# Patient Record
Sex: MALE | Race: WHITE | Employment: OTHER | URBAN - METROPOLITAN AREA
[De-identification: names, ages, dates, MRNs, and addresses within clinical notes are randomized per-mention and may not be internally consistent; named-entity substitution may affect disease eponyms.]

---

## 2017-11-15 PROBLEM — I73.9 PERIPHERAL VASCULAR DISEASE (HCC): Chronic | Status: ACTIVE | Noted: 2017-11-15

## 2017-11-15 PROBLEM — I65.23 CAROTID STENOSIS, ASYMPTOMATIC, BILATERAL: Chronic | Status: ACTIVE | Noted: 2017-11-15

## 2017-11-15 PROBLEM — I65.29 CAROTID ARTERY STENOSIS: Chronic | Status: ACTIVE | Noted: 2017-11-15

## 2017-11-17 PROBLEM — I65.23 CAROTID STENOSIS, ASYMPTOMATIC, BILATERAL: Chronic | Status: ACTIVE | Noted: 2017-11-17

## 2018-05-09 ENCOUNTER — HOSPITAL ENCOUNTER (OUTPATIENT)
Dept: CARDIOLOGY | Age: 78
Discharge: HOME | End: 2018-05-09
Payer: MEDICARE

## 2018-05-09 ENCOUNTER — HOSPITAL ENCOUNTER (OUTPATIENT)
Dept: CARDIOLOGY | Age: 78
Discharge: HOME | End: 2018-05-09
Admitting: SURGERY
Payer: MEDICARE

## 2018-05-09 ENCOUNTER — OFFICE VISIT (OUTPATIENT)
Dept: VASCULAR SURGERY | Age: 78
End: 2018-05-09
Payer: MEDICARE

## 2018-05-09 VITALS — DIASTOLIC BLOOD PRESSURE: 86 MMHG | HEART RATE: 74 BPM | SYSTOLIC BLOOD PRESSURE: 144 MMHG

## 2018-05-09 DIAGNOSIS — I65.23 CAROTID STENOSIS, ASYMPTOMATIC, BILATERAL: Chronic | ICD-10-CM

## 2018-05-09 DIAGNOSIS — I73.9 PERIPHERAL VASCULAR DISEASE (HCC): Chronic | ICD-10-CM

## 2018-05-09 DIAGNOSIS — I73.9 PERIPHERAL VASCULAR DISEASE (HCC): Primary | Chronic | ICD-10-CM

## 2018-05-09 DIAGNOSIS — I65.23 CAROTID ARTERY STENOSIS WITHOUT CEREBRAL INFARCTION, BILATERAL: Chronic | ICD-10-CM

## 2018-05-09 PROCEDURE — 1036F TOBACCO NON-USER: CPT | Performed by: SURGERY

## 2018-05-09 PROCEDURE — G8427 DOCREV CUR MEDS BY ELIG CLIN: HCPCS | Performed by: SURGERY

## 2018-05-09 PROCEDURE — 4040F PNEUMOC VAC/ADMIN/RCVD: CPT | Performed by: SURGERY

## 2018-05-09 PROCEDURE — 93880 EXTRACRANIAL BILAT STUDY: CPT

## 2018-05-09 PROCEDURE — 99213 OFFICE O/P EST LOW 20 MIN: CPT | Performed by: SURGERY

## 2018-05-09 PROCEDURE — 1123F ACP DISCUSS/DSCN MKR DOCD: CPT | Performed by: SURGERY

## 2018-05-09 PROCEDURE — 93923 UPR/LXTR ART STDY 3+ LVLS: CPT

## 2018-05-09 PROCEDURE — G8421 BMI NOT CALCULATED: HCPCS | Performed by: SURGERY

## 2018-05-09 PROCEDURE — G8598 ASA/ANTIPLAT THER USED: HCPCS | Performed by: SURGERY

## 2018-05-09 RX ORDER — HYDRALAZINE HYDROCHLORIDE 25 MG/1
50 TABLET, FILM COATED ORAL 3 TIMES DAILY
Refills: 5 | COMMUNITY
Start: 2018-04-12 | End: 2019-02-06 | Stop reason: DRUGHIGH

## 2018-10-23 ENCOUNTER — HOSPITAL ENCOUNTER (EMERGENCY)
Age: 78
Discharge: HOME | End: 2018-10-23
Attending: EMERGENCY MEDICINE
Payer: MEDICARE

## 2018-10-23 VITALS
BODY MASS INDEX: 33.33 KG/M2 | HEIGHT: 69 IN | TEMPERATURE: 98.4 F | OXYGEN SATURATION: 96 % | WEIGHT: 225 LBS | DIASTOLIC BLOOD PRESSURE: 86 MMHG | HEART RATE: 97 BPM | SYSTOLIC BLOOD PRESSURE: 182 MMHG | RESPIRATION RATE: 16 BRPM

## 2018-10-23 DIAGNOSIS — L03.113 CELLULITIS OF RIGHT UPPER EXTREMITY: Primary | ICD-10-CM

## 2018-10-23 PROCEDURE — 6360000002 HC RX W HCPCS: Performed by: EMERGENCY MEDICINE

## 2018-10-23 PROCEDURE — 96372 THER/PROPH/DIAG INJ SC/IM: CPT

## 2018-10-23 PROCEDURE — 6370000000 HC RX 637 (ALT 250 FOR IP): Performed by: EMERGENCY MEDICINE

## 2018-10-23 PROCEDURE — 2500000003 HC RX 250 WO HCPCS: Performed by: EMERGENCY MEDICINE

## 2018-10-23 PROCEDURE — 99283 EMERGENCY DEPT VISIT LOW MDM: CPT

## 2018-10-23 RX ORDER — ACETAMINOPHEN 500 MG
1000 TABLET ORAL ONCE
Status: COMPLETED | OUTPATIENT
Start: 2018-10-23 | End: 2018-10-23

## 2018-10-23 RX ORDER — CEPHALEXIN 500 MG/1
500 CAPSULE ORAL 4 TIMES DAILY
Qty: 40 CAPSULE | Refills: 0 | Status: SHIPPED | OUTPATIENT
Start: 2018-10-23 | End: 2018-11-02

## 2018-10-23 RX ADMIN — ACETAMINOPHEN 1000 MG: 500 TABLET, FILM COATED ORAL at 19:39

## 2018-10-23 RX ADMIN — LIDOCAINE HYDROCHLORIDE 1 G: 10 INJECTION, SOLUTION INFILTRATION; PERINEURAL at 19:39

## 2018-10-23 ASSESSMENT — PAIN SCALES - GENERAL: PAINLEVEL_OUTOF10: 10

## 2018-10-23 ASSESSMENT — PAIN DESCRIPTION - LOCATION: LOCATION: ELBOW

## 2018-10-23 ASSESSMENT — PAIN DESCRIPTION - PAIN TYPE: TYPE: ACUTE PAIN

## 2018-10-23 ASSESSMENT — PAIN DESCRIPTION - ORIENTATION: ORIENTATION: RIGHT

## 2018-10-23 ASSESSMENT — PAIN DESCRIPTION - DESCRIPTORS: DESCRIPTORS: TENDER

## 2018-10-23 NOTE — ED PROVIDER NOTES
medications have been reviewed. Allergies: Patient has no known allergies. -----------------------------------------PHYSICAL EXAM------------------------------------------------  Constitutional/General: Alert and oriented x3, well appearing, non toxic  Head: NC/AT  Eyes: PERRL, EOMI  Mouth: Oropharynx clear, handling secretions  Neck: Supple, full ROM  Pulmonary: Lungs clear to auscultation bilaterally, no wheezes, rales, or rhonchi. Not in respiratory distress. Cardiovascular:  Regular rate and rhythm, no murmurs, gallops, or rubs. 2+ distal pulses. Abdomen: Soft, non tender, non distended  Extremities: Moves all extremities. Warm and well perfused. Limited ROM of the right upper extremity d/t right elbow pain. Tenderness, erythema, and edema of the right elbow. No obvious deformity. No crepitus. Skin: warm and dry without rash. Neurologic: GCS 15, CN 2-12 grossly intact, no focal deficits, no meningeal signs  Psych: Normal Affect.    -------------------------------------------------- RESULTS -------------------------------------------------  All laboratory and radiology results have been personally reviewed by myself   LABS:  No results found for this visit on 10/23/18. RADIOLOGY:  Interpreted by Radiologist.  No orders to display       ------------------------- NURSING NOTES AND VITALS REVIEWED ---------------------------  The nursing notes within the ED encounter and vital signs as below have been reviewed.    BP (!) 182/86   Pulse 97   Temp 98.4 °F (36.9 °C) (Oral)   Resp 16   Ht 5' 9\" (1.753 m)   Wt 225 lb (102.1 kg)   SpO2 96%   BMI 33.23 kg/m²   Oxygen Saturation Interpretation: Normal    ------------------------------- ED COURSE/MEDICAL DECISION MAKING----------------------  Medications   cefTRIAXone (ROCEPHIN) 1 g in lidocaine 1 % 2.86 mL IM Injection (not administered)   acetaminophen (TYLENOL) tablet 1,000 mg (not administered)     Medical Decision Making:   Pt presents with

## 2018-11-14 ENCOUNTER — HOSPITAL ENCOUNTER (OUTPATIENT)
Dept: CARDIOLOGY | Age: 78
Discharge: HOME | End: 2018-11-14
Payer: MEDICARE

## 2018-11-14 ENCOUNTER — OFFICE VISIT (OUTPATIENT)
Dept: VASCULAR SURGERY | Age: 78
End: 2018-11-14
Payer: MEDICARE

## 2018-11-14 VITALS — DIASTOLIC BLOOD PRESSURE: 66 MMHG | HEART RATE: 70 BPM | SYSTOLIC BLOOD PRESSURE: 136 MMHG

## 2018-11-14 DIAGNOSIS — I65.23 BILATERAL CAROTID ARTERY STENOSIS: Primary | ICD-10-CM

## 2018-11-14 DIAGNOSIS — I73.9 PERIPHERAL VASCULAR DISEASE (HCC): Chronic | ICD-10-CM

## 2018-11-14 DIAGNOSIS — I65.23 CAROTID ARTERY STENOSIS WITHOUT CEREBRAL INFARCTION, BILATERAL: Chronic | ICD-10-CM

## 2018-11-14 PROCEDURE — 1123F ACP DISCUSS/DSCN MKR DOCD: CPT | Performed by: SURGERY

## 2018-11-14 PROCEDURE — G8598 ASA/ANTIPLAT THER USED: HCPCS | Performed by: SURGERY

## 2018-11-14 PROCEDURE — G8427 DOCREV CUR MEDS BY ELIG CLIN: HCPCS | Performed by: SURGERY

## 2018-11-14 PROCEDURE — 1036F TOBACCO NON-USER: CPT | Performed by: SURGERY

## 2018-11-14 PROCEDURE — 99213 OFFICE O/P EST LOW 20 MIN: CPT | Performed by: SURGERY

## 2018-11-14 PROCEDURE — 93880 EXTRACRANIAL BILAT STUDY: CPT

## 2018-11-14 PROCEDURE — 1101F PT FALLS ASSESS-DOCD LE1/YR: CPT | Performed by: SURGERY

## 2018-11-14 PROCEDURE — 93923 UPR/LXTR ART STDY 3+ LVLS: CPT

## 2018-11-14 PROCEDURE — G8417 CALC BMI ABV UP PARAM F/U: HCPCS | Performed by: SURGERY

## 2018-11-14 PROCEDURE — G8484 FLU IMMUNIZE NO ADMIN: HCPCS | Performed by: SURGERY

## 2018-11-14 PROCEDURE — 4040F PNEUMOC VAC/ADMIN/RCVD: CPT | Performed by: SURGERY

## 2018-11-14 RX ORDER — CALCITRIOL 0.25 UG/1
0.25 CAPSULE, LIQUID FILLED ORAL DAILY
COMMUNITY

## 2018-11-14 NOTE — PROGRESS NOTES
Vascular Surgery Outpatient Progress Note      Chief Complaint   Patient presents with    Circulatory Problem     Follow up carotid stenosis, PVD. HISTORY OF PRESENT ILLNESS:                The patient is a 76 y.o. male who returns for follow-up evaluation of Carotid artery stenosis and lower extremity peripheral vascular disease. At this point he states he's doing well he has no complaints of right-sided left-sided weakness numbness or vision changes. He denies any pain or discomfort with ambulation. He denies any ulcerations or rest pain. Past Medical History:        Diagnosis Date    Atrial fibrillation (HCC)     CKD (chronic kidney disease) stage 3, GFR 30-59 ml/min ([facility] [address] 12/17/2014    H/O syncope     Hemorrhoids     Hyperlipidemia     Hypertension      Past Surgical History:        Procedure Laterality Date    CAROTID ENDARTERECTOMY      CARPAL TUNNEL RELEASE Bilateral     CHOLECYSTECTOMY      FINGER TRIGGER RELEASE Bilateral     KNEE SURGERY      SHOULDER SURGERY Bilateral      Current Medications:   Prior to Admission medications    Medication Sig Start Date End Date Taking?  Authorizing Provider   calcitRIOL (ROCALTROL) 0.25 MCG capsule Take 0.25 mcg by mouth daily   Yes Historical Provider, MD   rivaroxaban (XARELTO) 15 MG TABS tablet Take 15 mg by mouth daily (with breakfast)   Yes Historical Provider, MD   hydrALAZINE (APRESOLINE) 25 MG tablet Take 50 mg by mouth 3 times daily  4/12/18  Yes Historical Provider, MD   vitamin D (ERGOCALCIFEROL) 80259 units CAPS capsule Take 50,000 Units by mouth once a week 10/10/17  Yes Historical Provider, MD   potassium chloride (KLOR-CON M) 20 MEQ extended release tablet Take 20 mEq by mouth daily 10/26/17  Yes Historical Provider, MD   atorvastatin (LIPITOR) 40 MG tablet Take 40 mg by mouth daily 10/1/17  Yes Historical Provider, MD   primidone (MYSOLINE) 50 MG tablet Take 50 mg by mouth 3 times daily 10/25/17  Yes Historical Provider, MD

## 2018-12-07 ENCOUNTER — TELEPHONE (OUTPATIENT)
Dept: VASCULAR SURGERY | Age: 78
End: 2018-12-07

## 2018-12-07 DIAGNOSIS — I65.23 BILATERAL CAROTID ARTERY STENOSIS: ICD-10-CM

## 2018-12-07 DIAGNOSIS — N18.30 CHRONIC KIDNEY DISEASE, STAGE III (MODERATE) (HCC): ICD-10-CM

## 2018-12-07 DIAGNOSIS — I65.23 BILATERAL CAROTID ARTERY STENOSIS: Primary | ICD-10-CM

## 2018-12-07 RX ORDER — SODIUM CHLORIDE 9 MG/ML
INJECTION, SOLUTION INTRAVENOUS
Qty: 500 ML | Refills: 0 | Status: SHIPPED | OUTPATIENT
Start: 2018-12-07 | End: 2019-02-06

## 2018-12-07 RX ORDER — 0.9 % SODIUM CHLORIDE 0.9 %
500 INTRAVENOUS SOLUTION INTRAVENOUS ONCE
Status: CANCELLED | OUTPATIENT
Start: 2018-12-07 | End: 2018-12-07

## 2018-12-14 ENCOUNTER — HOSPITAL ENCOUNTER (OUTPATIENT)
Dept: CT IMAGING | Age: 78
Discharge: HOME | End: 2018-12-16
Payer: MEDICARE

## 2018-12-14 ENCOUNTER — HOSPITAL ENCOUNTER (OUTPATIENT)
Dept: INFUSION THERAPY | Age: 78
Setting detail: INFUSION SERIES
Discharge: HOME | End: 2018-12-14
Payer: MEDICARE

## 2018-12-14 VITALS
SYSTOLIC BLOOD PRESSURE: 176 MMHG | TEMPERATURE: 98.1 F | DIASTOLIC BLOOD PRESSURE: 69 MMHG | HEART RATE: 53 BPM | RESPIRATION RATE: 18 BRPM | OXYGEN SATURATION: 99 %

## 2018-12-14 DIAGNOSIS — I65.23 BILATERAL CAROTID ARTERY STENOSIS: ICD-10-CM

## 2018-12-14 DIAGNOSIS — I65.23 CAROTID ARTERY STENOSIS WITHOUT CEREBRAL INFARCTION, BILATERAL: ICD-10-CM

## 2018-12-14 DIAGNOSIS — N18.30 CKD (CHRONIC KIDNEY DISEASE) STAGE 3, GFR 30-59 ML/MIN (HCC): ICD-10-CM

## 2018-12-14 LAB
ANION GAP SERPL CALCULATED.3IONS-SCNC: 11 MMOL/L (ref 7–16)
BUN BLDV-MCNC: 44 MG/DL (ref 8–23)
CALCIUM SERPL-MCNC: 9.4 MG/DL (ref 8.6–10.2)
CHLORIDE BLD-SCNC: 103 MMOL/L (ref 98–107)
CO2: 24 MMOL/L (ref 22–29)
CREAT SERPL-MCNC: 1.4 MG/DL (ref 0.7–1.2)
GFR AFRICAN AMERICAN: 59
GFR NON-AFRICAN AMERICAN: 49 ML/MIN/1.73
GLUCOSE BLD-MCNC: 105 MG/DL (ref 74–99)
POTASSIUM SERPL-SCNC: 5 MMOL/L (ref 3.5–5)
SODIUM BLD-SCNC: 138 MMOL/L (ref 132–146)

## 2018-12-14 PROCEDURE — 2580000003 HC RX 258: Performed by: SURGERY

## 2018-12-14 PROCEDURE — 96360 HYDRATION IV INFUSION INIT: CPT

## 2018-12-14 PROCEDURE — 80048 BASIC METABOLIC PNL TOTAL CA: CPT

## 2018-12-14 PROCEDURE — 2580000003 HC RX 258

## 2018-12-14 PROCEDURE — 36415 COLL VENOUS BLD VENIPUNCTURE: CPT

## 2018-12-14 PROCEDURE — 96361 HYDRATE IV INFUSION ADD-ON: CPT

## 2018-12-14 PROCEDURE — 70498 CT ANGIOGRAPHY NECK: CPT

## 2018-12-14 PROCEDURE — 6360000004 HC RX CONTRAST MEDICATION: Performed by: RADIOLOGY

## 2018-12-14 RX ORDER — 0.9 % SODIUM CHLORIDE 0.9 %
500 INTRAVENOUS SOLUTION INTRAVENOUS ONCE
Status: COMPLETED | OUTPATIENT
Start: 2018-12-14 | End: 2018-12-14

## 2018-12-14 RX ORDER — 0.9 % SODIUM CHLORIDE 0.9 %
500 INTRAVENOUS SOLUTION INTRAVENOUS ONCE
Status: CANCELLED | OUTPATIENT
Start: 2018-12-14 | End: 2018-12-14

## 2018-12-14 RX ORDER — SODIUM CHLORIDE 0.9 % (FLUSH) 0.9 %
SYRINGE (ML) INJECTION
Status: COMPLETED
Start: 2018-12-14 | End: 2018-12-14

## 2018-12-14 RX ORDER — SODIUM CHLORIDE 0.9 % (FLUSH) 0.9 %
10 SYRINGE (ML) INJECTION PRN
Status: DISCONTINUED | OUTPATIENT
Start: 2018-12-14 | End: 2018-12-15 | Stop reason: HOSPADM

## 2018-12-14 RX ADMIN — Medication 10 ML: at 08:49

## 2018-12-14 RX ADMIN — IOPAMIDOL 60 ML: 755 INJECTION, SOLUTION INTRAVENOUS at 15:08

## 2018-12-14 RX ADMIN — SODIUM CHLORIDE 500 ML: 9 INJECTION, SOLUTION INTRAVENOUS at 08:50

## 2018-12-18 ENCOUNTER — TELEPHONE (OUTPATIENT)
Dept: VASCULAR SURGERY | Age: 78
End: 2018-12-18

## 2019-02-06 ENCOUNTER — HOSPITAL ENCOUNTER (INPATIENT)
Age: 79
LOS: 5 days | Discharge: HOME | DRG: 813 | End: 2019-02-11
Attending: EMERGENCY MEDICINE | Admitting: INTERNAL MEDICINE
Payer: MEDICARE

## 2019-02-06 DIAGNOSIS — K92.2 GASTROINTESTINAL HEMORRHAGE, UNSPECIFIED GASTROINTESTINAL HEMORRHAGE TYPE: Primary | ICD-10-CM

## 2019-02-06 DIAGNOSIS — K62.5 RECTAL BLEEDING: ICD-10-CM

## 2019-02-06 PROBLEM — D62 ACUTE BLOOD LOSS ANEMIA: Status: ACTIVE | Noted: 2019-02-06

## 2019-02-06 LAB
ABO/RH: NORMAL
ALBUMIN SERPL-MCNC: 3.6 G/DL (ref 3.5–5.2)
ALP BLD-CCNC: 61 U/L (ref 40–129)
ALT SERPL-CCNC: 25 U/L (ref 0–40)
ANION GAP SERPL CALCULATED.3IONS-SCNC: 9 MMOL/L (ref 7–16)
ANTIBODY SCREEN: NORMAL
APTT: 27.6 SEC (ref 24.5–35.1)
AST SERPL-CCNC: 21 U/L (ref 0–39)
BILIRUB SERPL-MCNC: 0.3 MG/DL (ref 0–1.2)
BUN BLDV-MCNC: 100 MG/DL (ref 8–23)
CALCIUM SERPL-MCNC: 8.8 MG/DL (ref 8.6–10.2)
CHLORIDE BLD-SCNC: 104 MMOL/L (ref 98–107)
CO2: 23 MMOL/L (ref 22–29)
CREAT SERPL-MCNC: 2.2 MG/DL (ref 0.7–1.2)
GFR AFRICAN AMERICAN: 35
GFR NON-AFRICAN AMERICAN: 29 ML/MIN/1.73
GLUCOSE BLD-MCNC: 119 MG/DL (ref 74–99)
HCT VFR BLD CALC: 23.9 % (ref 37–54)
HEMOGLOBIN: 7.7 G/DL (ref 12.5–16.5)
INR BLD: 1.3
LACTIC ACID: 1 MMOL/L (ref 0.5–2.2)
LIPASE: 37 U/L (ref 13–60)
MCH RBC QN AUTO: 30.9 PG (ref 26–35)
MCHC RBC AUTO-ENTMCNC: 32.2 % (ref 32–34.5)
MCV RBC AUTO: 96 FL (ref 80–99.9)
PDW BLD-RTO: 15.1 FL (ref 11.5–15)
PLATELET # BLD: 223 E9/L (ref 130–450)
PMV BLD AUTO: 10.4 FL (ref 7–12)
POTASSIUM SERPL-SCNC: 5.2 MMOL/L (ref 3.5–5)
PROTHROMBIN TIME: 14.9 SEC (ref 9.3–12.4)
RBC # BLD: 2.49 E12/L (ref 3.8–5.8)
SODIUM BLD-SCNC: 136 MMOL/L (ref 132–146)
TOTAL PROTEIN: 5.9 G/DL (ref 6.4–8.3)
WBC # BLD: 10.1 E9/L (ref 4.5–11.5)

## 2019-02-06 PROCEDURE — 85730 THROMBOPLASTIN TIME PARTIAL: CPT

## 2019-02-06 PROCEDURE — 6360000002 HC RX W HCPCS: Performed by: INTERNAL MEDICINE

## 2019-02-06 PROCEDURE — 86900 BLOOD TYPING SEROLOGIC ABO: CPT

## 2019-02-06 PROCEDURE — 6370000000 HC RX 637 (ALT 250 FOR IP): Performed by: INTERNAL MEDICINE

## 2019-02-06 PROCEDURE — 83690 ASSAY OF LIPASE: CPT

## 2019-02-06 PROCEDURE — 2580000003 HC RX 258: Performed by: INTERNAL MEDICINE

## 2019-02-06 PROCEDURE — 2060000000 HC ICU INTERMEDIATE R&B

## 2019-02-06 PROCEDURE — C9113 INJ PANTOPRAZOLE SODIUM, VIA: HCPCS | Performed by: INTERNAL MEDICINE

## 2019-02-06 PROCEDURE — 80053 COMPREHEN METABOLIC PANEL: CPT

## 2019-02-06 PROCEDURE — 86901 BLOOD TYPING SEROLOGIC RH(D): CPT

## 2019-02-06 PROCEDURE — 86850 RBC ANTIBODY SCREEN: CPT

## 2019-02-06 PROCEDURE — 2580000003 HC RX 258: Performed by: EMERGENCY MEDICINE

## 2019-02-06 PROCEDURE — 36415 COLL VENOUS BLD VENIPUNCTURE: CPT

## 2019-02-06 PROCEDURE — 83605 ASSAY OF LACTIC ACID: CPT

## 2019-02-06 PROCEDURE — 93005 ELECTROCARDIOGRAM TRACING: CPT | Performed by: STUDENT IN AN ORGANIZED HEALTH CARE EDUCATION/TRAINING PROGRAM

## 2019-02-06 PROCEDURE — 99284 EMERGENCY DEPT VISIT MOD MDM: CPT

## 2019-02-06 PROCEDURE — 85027 COMPLETE CBC AUTOMATED: CPT

## 2019-02-06 PROCEDURE — 85610 PROTHROMBIN TIME: CPT

## 2019-02-06 RX ORDER — HYDRALAZINE HYDROCHLORIDE 50 MG/1
50 TABLET, FILM COATED ORAL 3 TIMES DAILY
Status: DISCONTINUED | OUTPATIENT
Start: 2019-02-06 | End: 2019-02-11 | Stop reason: HOSPADM

## 2019-02-06 RX ORDER — 0.9 % SODIUM CHLORIDE 0.9 %
10 VIAL (ML) INJECTION EVERY 12 HOURS
Status: DISCONTINUED | OUTPATIENT
Start: 2019-02-06 | End: 2019-02-11 | Stop reason: HOSPADM

## 2019-02-06 RX ORDER — ACETAMINOPHEN 325 MG/1
650 TABLET ORAL EVERY 4 HOURS PRN
Status: DISCONTINUED | OUTPATIENT
Start: 2019-02-06 | End: 2019-02-11 | Stop reason: HOSPADM

## 2019-02-06 RX ORDER — PANTOPRAZOLE SODIUM 40 MG/10ML
40 INJECTION, POWDER, LYOPHILIZED, FOR SOLUTION INTRAVENOUS EVERY 12 HOURS
Status: DISCONTINUED | OUTPATIENT
Start: 2019-02-06 | End: 2019-02-07

## 2019-02-06 RX ORDER — SODIUM CHLORIDE 0.9 % (FLUSH) 0.9 %
10 SYRINGE (ML) INJECTION PRN
Status: DISCONTINUED | OUTPATIENT
Start: 2019-02-06 | End: 2019-02-11 | Stop reason: HOSPADM

## 2019-02-06 RX ORDER — 0.9 % SODIUM CHLORIDE 0.9 %
1000 INTRAVENOUS SOLUTION INTRAVENOUS ONCE
Status: COMPLETED | OUTPATIENT
Start: 2019-02-06 | End: 2019-02-06

## 2019-02-06 RX ORDER — HYDRALAZINE HYDROCHLORIDE 50 MG/1
1 TABLET, FILM COATED ORAL 3 TIMES DAILY
Refills: 2 | Status: ON HOLD | COMMUNITY
Start: 2019-01-28 | End: 2020-03-10 | Stop reason: SDUPTHER

## 2019-02-06 RX ORDER — METOPROLOL SUCCINATE 50 MG/1
50 TABLET, EXTENDED RELEASE ORAL DAILY
Status: DISCONTINUED | OUTPATIENT
Start: 2019-02-06 | End: 2019-02-11 | Stop reason: HOSPADM

## 2019-02-06 RX ORDER — PRIMIDONE 50 MG/1
50 TABLET ORAL 3 TIMES DAILY
Status: DISCONTINUED | OUTPATIENT
Start: 2019-02-06 | End: 2019-02-11 | Stop reason: HOSPADM

## 2019-02-06 RX ORDER — METOPROLOL SUCCINATE 50 MG/1
1 TABLET, EXTENDED RELEASE ORAL DAILY
Refills: 5 | COMMUNITY
Start: 2018-11-28 | End: 2019-10-31

## 2019-02-06 RX ORDER — ONDANSETRON 2 MG/ML
4 INJECTION INTRAMUSCULAR; INTRAVENOUS EVERY 6 HOURS PRN
Status: DISCONTINUED | OUTPATIENT
Start: 2019-02-06 | End: 2019-02-11 | Stop reason: HOSPADM

## 2019-02-06 RX ORDER — SODIUM CHLORIDE 0.9 % (FLUSH) 0.9 %
10 SYRINGE (ML) INJECTION EVERY 12 HOURS SCHEDULED
Status: DISCONTINUED | OUTPATIENT
Start: 2019-02-06 | End: 2019-02-11 | Stop reason: HOSPADM

## 2019-02-06 RX ORDER — FUROSEMIDE 40 MG/1
1 TABLET ORAL DAILY
Refills: 0 | Status: ON HOLD | COMMUNITY
Start: 2019-01-27 | End: 2019-02-15 | Stop reason: HOSPADM

## 2019-02-06 RX ORDER — SODIUM CHLORIDE 9 MG/ML
INJECTION, SOLUTION INTRAVENOUS CONTINUOUS
Status: DISCONTINUED | OUTPATIENT
Start: 2019-02-06 | End: 2019-02-07

## 2019-02-06 RX ADMIN — Medication 10 ML: at 20:52

## 2019-02-06 RX ADMIN — HYDRALAZINE HYDROCHLORIDE 50 MG: 50 TABLET, FILM COATED ORAL at 20:52

## 2019-02-06 RX ADMIN — SODIUM CHLORIDE: 9 INJECTION, SOLUTION INTRAVENOUS at 16:00

## 2019-02-06 RX ADMIN — SODIUM CHLORIDE 1000 ML: 9 INJECTION, SOLUTION INTRAVENOUS at 13:28

## 2019-02-06 RX ADMIN — PANTOPRAZOLE SODIUM 40 MG: 40 INJECTION, POWDER, FOR SOLUTION INTRAVENOUS at 14:38

## 2019-02-06 RX ADMIN — Medication 10 ML: at 16:06

## 2019-02-06 RX ADMIN — HYDRALAZINE HYDROCHLORIDE 50 MG: 50 TABLET, FILM COATED ORAL at 16:06

## 2019-02-06 RX ADMIN — METOPROLOL SUCCINATE 50 MG: 50 TABLET, FILM COATED, EXTENDED RELEASE ORAL at 16:06

## 2019-02-06 ASSESSMENT — ENCOUNTER SYMPTOMS
CONSTIPATION: 0
BLOOD IN STOOL: 1
NAUSEA: 0
ABDOMINAL PAIN: 0
SORE THROAT: 0
HEMATOCHEZIA: 1
WHEEZING: 0
VOMITING: 0
EYE PAIN: 0
BACK PAIN: 0
COUGH: 0
SHORTNESS OF BREATH: 0
EYE REDNESS: 0
EYE DISCHARGE: 0
DIARRHEA: 1
HEMATEMESIS: 0
SINUS PRESSURE: 0

## 2019-02-06 ASSESSMENT — PAIN SCALES - GENERAL
PAINLEVEL_OUTOF10: 0
PAINLEVEL_OUTOF10: 0

## 2019-02-07 ENCOUNTER — ANESTHESIA EVENT (OUTPATIENT)
Dept: ENDOSCOPY | Age: 79
DRG: 813 | End: 2019-02-07
Payer: MEDICARE

## 2019-02-07 ENCOUNTER — ANESTHESIA (OUTPATIENT)
Dept: ENDOSCOPY | Age: 79
DRG: 813 | End: 2019-02-07
Payer: MEDICARE

## 2019-02-07 VITALS
RESPIRATION RATE: 24 BRPM | DIASTOLIC BLOOD PRESSURE: 77 MMHG | SYSTOLIC BLOOD PRESSURE: 169 MMHG | OXYGEN SATURATION: 99 %

## 2019-02-07 LAB
BLOOD BANK DISPENSE STATUS: NORMAL
BLOOD BANK PRODUCT CODE: NORMAL
BPU ID: NORMAL
DESCRIPTION BLOOD BANK: NORMAL
HEMOGLOBIN: 6.6 G/DL (ref 12.5–16.5)
HEMOGLOBIN: 7.8 G/DL (ref 12.5–16.5)

## 2019-02-07 PROCEDURE — 3700000000 HC ANESTHESIA ATTENDED CARE: Performed by: SURGERY

## 2019-02-07 PROCEDURE — 2580000003 HC RX 258

## 2019-02-07 PROCEDURE — 88342 IMHCHEM/IMCYTCHM 1ST ANTB: CPT

## 2019-02-07 PROCEDURE — 2060000000 HC ICU INTERMEDIATE R&B

## 2019-02-07 PROCEDURE — C9113 INJ PANTOPRAZOLE SODIUM, VIA: HCPCS | Performed by: SURGERY

## 2019-02-07 PROCEDURE — 85018 HEMOGLOBIN: CPT

## 2019-02-07 PROCEDURE — 2580000003 HC RX 258: Performed by: INTERNAL MEDICINE

## 2019-02-07 PROCEDURE — 6370000000 HC RX 637 (ALT 250 FOR IP): Performed by: SURGERY

## 2019-02-07 PROCEDURE — 3609012400 HC EGD TRANSORAL BIOPSY SINGLE/MULTIPLE: Performed by: SURGERY

## 2019-02-07 PROCEDURE — 0DB78ZX EXCISION OF STOMACH, PYLORUS, VIA NATURAL OR ARTIFICIAL OPENING ENDOSCOPIC, DIAGNOSTIC: ICD-10-PCS | Performed by: SURGERY

## 2019-02-07 PROCEDURE — 6360000002 HC RX W HCPCS: Performed by: INTERNAL MEDICINE

## 2019-02-07 PROCEDURE — 6370000000 HC RX 637 (ALT 250 FOR IP): Performed by: INTERNAL MEDICINE

## 2019-02-07 PROCEDURE — 88305 TISSUE EXAM BY PATHOLOGIST: CPT

## 2019-02-07 PROCEDURE — 7100000001 HC PACU RECOVERY - ADDTL 15 MIN: Performed by: SURGERY

## 2019-02-07 PROCEDURE — 2500000003 HC RX 250 WO HCPCS

## 2019-02-07 PROCEDURE — 6360000002 HC RX W HCPCS

## 2019-02-07 PROCEDURE — 3700000001 HC ADD 15 MINUTES (ANESTHESIA): Performed by: SURGERY

## 2019-02-07 PROCEDURE — 2580000003 HC RX 258: Performed by: SURGERY

## 2019-02-07 PROCEDURE — 86923 COMPATIBILITY TEST ELECTRIC: CPT

## 2019-02-07 PROCEDURE — 36430 TRANSFUSION BLD/BLD COMPNT: CPT

## 2019-02-07 PROCEDURE — P9016 RBC LEUKOCYTES REDUCED: HCPCS

## 2019-02-07 PROCEDURE — 7100000000 HC PACU RECOVERY - FIRST 15 MIN: Performed by: SURGERY

## 2019-02-07 PROCEDURE — 36415 COLL VENOUS BLD VENIPUNCTURE: CPT

## 2019-02-07 PROCEDURE — 6360000002 HC RX W HCPCS: Performed by: SURGERY

## 2019-02-07 PROCEDURE — C9113 INJ PANTOPRAZOLE SODIUM, VIA: HCPCS | Performed by: INTERNAL MEDICINE

## 2019-02-07 PROCEDURE — 2709999900 HC NON-CHARGEABLE SUPPLY: Performed by: SURGERY

## 2019-02-07 RX ORDER — SUCRALFATE 1 G/1
1 TABLET ORAL
Status: DISCONTINUED | OUTPATIENT
Start: 2019-02-07 | End: 2019-02-11 | Stop reason: HOSPADM

## 2019-02-07 RX ORDER — LIDOCAINE HYDROCHLORIDE 20 MG/ML
INJECTION, SOLUTION INFILTRATION; PERINEURAL PRN
Status: DISCONTINUED | OUTPATIENT
Start: 2019-02-07 | End: 2019-02-07 | Stop reason: SDUPTHER

## 2019-02-07 RX ORDER — PANTOPRAZOLE SODIUM 40 MG/10ML
40 INJECTION, POWDER, LYOPHILIZED, FOR SOLUTION INTRAVENOUS 2 TIMES DAILY
Status: DISCONTINUED | OUTPATIENT
Start: 2019-02-07 | End: 2019-02-11 | Stop reason: HOSPADM

## 2019-02-07 RX ORDER — PROPOFOL 10 MG/ML
INJECTION, EMULSION INTRAVENOUS PRN
Status: DISCONTINUED | OUTPATIENT
Start: 2019-02-07 | End: 2019-02-07 | Stop reason: SDUPTHER

## 2019-02-07 RX ORDER — 0.9 % SODIUM CHLORIDE 0.9 %
250 INTRAVENOUS SOLUTION INTRAVENOUS ONCE
Status: COMPLETED | OUTPATIENT
Start: 2019-02-07 | End: 2019-02-07

## 2019-02-07 RX ORDER — SODIUM CHLORIDE 9 MG/ML
INJECTION, SOLUTION INTRAVENOUS CONTINUOUS PRN
Status: DISCONTINUED | OUTPATIENT
Start: 2019-02-07 | End: 2019-02-07 | Stop reason: SDUPTHER

## 2019-02-07 RX ADMIN — PROPOFOL 20 MG: 10 INJECTION, EMULSION INTRAVENOUS at 14:04

## 2019-02-07 RX ADMIN — PROPOFOL 50 MG: 10 INJECTION, EMULSION INTRAVENOUS at 14:01

## 2019-02-07 RX ADMIN — SUCRALFATE 1 G: 1 TABLET ORAL at 20:34

## 2019-02-07 RX ADMIN — METOPROLOL SUCCINATE 50 MG: 50 TABLET, FILM COATED, EXTENDED RELEASE ORAL at 10:00

## 2019-02-07 RX ADMIN — HYDRALAZINE HYDROCHLORIDE 50 MG: 50 TABLET, FILM COATED ORAL at 16:08

## 2019-02-07 RX ADMIN — PRIMIDONE 50 MG: 50 TABLET ORAL at 20:34

## 2019-02-07 RX ADMIN — Medication 10 ML: at 01:53

## 2019-02-07 RX ADMIN — PRIMIDONE 50 MG: 50 TABLET ORAL at 16:08

## 2019-02-07 RX ADMIN — PROPOFOL 20 MG: 10 INJECTION, EMULSION INTRAVENOUS at 14:03

## 2019-02-07 RX ADMIN — PANTOPRAZOLE SODIUM 40 MG: 40 INJECTION, POWDER, FOR SOLUTION INTRAVENOUS at 20:34

## 2019-02-07 RX ADMIN — SODIUM CHLORIDE: 9 INJECTION, SOLUTION INTRAVENOUS at 13:57

## 2019-02-07 RX ADMIN — HYDRALAZINE HYDROCHLORIDE 50 MG: 50 TABLET, FILM COATED ORAL at 20:34

## 2019-02-07 RX ADMIN — PRIMIDONE 50 MG: 50 TABLET ORAL at 00:22

## 2019-02-07 RX ADMIN — PROPOFOL 10 MG: 10 INJECTION, EMULSION INTRAVENOUS at 14:05

## 2019-02-07 RX ADMIN — PANTOPRAZOLE SODIUM 40 MG: 40 INJECTION, POWDER, FOR SOLUTION INTRAVENOUS at 01:53

## 2019-02-07 RX ADMIN — HYDRALAZINE HYDROCHLORIDE 50 MG: 50 TABLET, FILM COATED ORAL at 10:00

## 2019-02-07 RX ADMIN — LIDOCAINE HYDROCHLORIDE 40 MG: 20 INJECTION, SOLUTION INFILTRATION; PERINEURAL at 14:01

## 2019-02-07 RX ADMIN — SUCRALFATE 1 G: 1 TABLET ORAL at 17:16

## 2019-02-07 RX ADMIN — SODIUM CHLORIDE 250 ML: 9 INJECTION, SOLUTION INTRAVENOUS at 02:38

## 2019-02-07 RX ADMIN — Medication 10 ML: at 20:35

## 2019-02-07 RX ADMIN — PROPOFOL 10 MG: 10 INJECTION, EMULSION INTRAVENOUS at 14:02

## 2019-02-07 ASSESSMENT — PAIN SCALES - GENERAL
PAINLEVEL_OUTOF10: 0

## 2019-02-08 LAB
BASOPHILS ABSOLUTE: 0.05 E9/L (ref 0–0.2)
BASOPHILS RELATIVE PERCENT: 0.6 % (ref 0–2)
EKG ATRIAL RATE: 70 BPM
EKG Q-T INTERVAL: 368 MS
EKG QRS DURATION: 84 MS
EKG QTC CALCULATION (BAZETT): 429 MS
EKG R AXIS: -11 DEGREES
EKG T AXIS: 141 DEGREES
EKG VENTRICULAR RATE: 82 BPM
EOSINOPHILS ABSOLUTE: 0.54 E9/L (ref 0.05–0.5)
EOSINOPHILS RELATIVE PERCENT: 6 % (ref 0–6)
HCT VFR BLD CALC: 26.8 % (ref 37–54)
HEMOGLOBIN: 8.6 G/DL (ref 12.5–16.5)
IMMATURE GRANULOCYTES #: 0.05 E9/L
IMMATURE GRANULOCYTES %: 0.6 % (ref 0–5)
LV EF: 69 %
LVEF MODALITY: NORMAL
LYMPHOCYTES ABSOLUTE: 1.75 E9/L (ref 1.5–4)
LYMPHOCYTES RELATIVE PERCENT: 19.3 % (ref 20–42)
MCH RBC QN AUTO: 31 PG (ref 26–35)
MCHC RBC AUTO-ENTMCNC: 32.1 % (ref 32–34.5)
MCV RBC AUTO: 96.8 FL (ref 80–99.9)
MONOCYTES ABSOLUTE: 0.84 E9/L (ref 0.1–0.95)
MONOCYTES RELATIVE PERCENT: 9.3 % (ref 2–12)
NEUTROPHILS ABSOLUTE: 5.84 E9/L (ref 1.8–7.3)
NEUTROPHILS RELATIVE PERCENT: 64.2 % (ref 43–80)
PDW BLD-RTO: 16 FL (ref 11.5–15)
PLATELET # BLD: 228 E9/L (ref 130–450)
PMV BLD AUTO: 10.9 FL (ref 7–12)
RBC # BLD: 2.77 E12/L (ref 3.8–5.8)
WBC # BLD: 9.1 E9/L (ref 4.5–11.5)

## 2019-02-08 PROCEDURE — 6370000000 HC RX 637 (ALT 250 FOR IP): Performed by: INTERNAL MEDICINE

## 2019-02-08 PROCEDURE — 2580000003 HC RX 258: Performed by: INTERNAL MEDICINE

## 2019-02-08 PROCEDURE — C9113 INJ PANTOPRAZOLE SODIUM, VIA: HCPCS | Performed by: SURGERY

## 2019-02-08 PROCEDURE — 6370000000 HC RX 637 (ALT 250 FOR IP): Performed by: SURGERY

## 2019-02-08 PROCEDURE — 85025 COMPLETE CBC W/AUTO DIFF WBC: CPT

## 2019-02-08 PROCEDURE — 36415 COLL VENOUS BLD VENIPUNCTURE: CPT

## 2019-02-08 PROCEDURE — 6360000002 HC RX W HCPCS: Performed by: SURGERY

## 2019-02-08 PROCEDURE — 93306 TTE W/DOPPLER COMPLETE: CPT

## 2019-02-08 PROCEDURE — 1200000000 HC SEMI PRIVATE

## 2019-02-08 RX ADMIN — HYDRALAZINE HYDROCHLORIDE 50 MG: 50 TABLET, FILM COATED ORAL at 08:00

## 2019-02-08 RX ADMIN — SUCRALFATE 1 G: 1 TABLET ORAL at 20:22

## 2019-02-08 RX ADMIN — SUCRALFATE 1 G: 1 TABLET ORAL at 18:45

## 2019-02-08 RX ADMIN — PRIMIDONE 50 MG: 50 TABLET ORAL at 20:22

## 2019-02-08 RX ADMIN — PANTOPRAZOLE SODIUM 40 MG: 40 INJECTION, POWDER, FOR SOLUTION INTRAVENOUS at 08:00

## 2019-02-08 RX ADMIN — PANTOPRAZOLE SODIUM 40 MG: 40 INJECTION, POWDER, FOR SOLUTION INTRAVENOUS at 20:23

## 2019-02-08 RX ADMIN — PRIMIDONE 50 MG: 50 TABLET ORAL at 15:18

## 2019-02-08 RX ADMIN — SUCRALFATE 1 G: 1 TABLET ORAL at 07:59

## 2019-02-08 RX ADMIN — SUCRALFATE 1 G: 1 TABLET ORAL at 13:15

## 2019-02-08 RX ADMIN — METOPROLOL SUCCINATE 50 MG: 50 TABLET, FILM COATED, EXTENDED RELEASE ORAL at 07:59

## 2019-02-08 RX ADMIN — HYDRALAZINE HYDROCHLORIDE 50 MG: 50 TABLET, FILM COATED ORAL at 20:22

## 2019-02-08 RX ADMIN — HYDRALAZINE HYDROCHLORIDE 50 MG: 50 TABLET, FILM COATED ORAL at 15:18

## 2019-02-08 RX ADMIN — PRIMIDONE 50 MG: 50 TABLET ORAL at 09:14

## 2019-02-08 RX ADMIN — Medication 10 ML: at 20:31

## 2019-02-08 RX ADMIN — Medication 10 ML: at 08:00

## 2019-02-08 RX ADMIN — Medication 10 ML: at 15:19

## 2019-02-08 ASSESSMENT — PAIN SCALES - GENERAL
PAINLEVEL_OUTOF10: 0

## 2019-02-09 PROBLEM — I34.0 MILD MITRAL REGURGITATION: Chronic | Status: ACTIVE | Noted: 2019-02-08

## 2019-02-09 LAB
ANION GAP SERPL CALCULATED.3IONS-SCNC: 6 MMOL/L (ref 7–16)
BASOPHILS ABSOLUTE: 0.05 E9/L (ref 0–0.2)
BASOPHILS RELATIVE PERCENT: 0.6 % (ref 0–2)
BUN BLDV-MCNC: 32 MG/DL (ref 8–23)
CALCIUM SERPL-MCNC: 8.3 MG/DL (ref 8.6–10.2)
CHLORIDE BLD-SCNC: 107 MMOL/L (ref 98–107)
CO2: 24 MMOL/L (ref 22–29)
CREAT SERPL-MCNC: 1.5 MG/DL (ref 0.7–1.2)
EOSINOPHILS ABSOLUTE: 0.45 E9/L (ref 0.05–0.5)
EOSINOPHILS RELATIVE PERCENT: 5.2 % (ref 0–6)
GFR AFRICAN AMERICAN: 54
GFR NON-AFRICAN AMERICAN: 45 ML/MIN/1.73
GLUCOSE BLD-MCNC: 111 MG/DL (ref 74–99)
HCT VFR BLD CALC: 24.9 % (ref 37–54)
HEMOGLOBIN: 7.7 G/DL (ref 12.5–16.5)
IMMATURE GRANULOCYTES #: 0.04 E9/L
IMMATURE GRANULOCYTES %: 0.5 % (ref 0–5)
LYMPHOCYTES ABSOLUTE: 1.34 E9/L (ref 1.5–4)
LYMPHOCYTES RELATIVE PERCENT: 15.5 % (ref 20–42)
MCH RBC QN AUTO: 30.6 PG (ref 26–35)
MCHC RBC AUTO-ENTMCNC: 30.9 % (ref 32–34.5)
MCV RBC AUTO: 98.8 FL (ref 80–99.9)
MONOCYTES ABSOLUTE: 1 E9/L (ref 0.1–0.95)
MONOCYTES RELATIVE PERCENT: 11.6 % (ref 2–12)
NEUTROPHILS ABSOLUTE: 5.75 E9/L (ref 1.8–7.3)
NEUTROPHILS RELATIVE PERCENT: 66.6 % (ref 43–80)
PDW BLD-RTO: 16.2 FL (ref 11.5–15)
PLATELET # BLD: 216 E9/L (ref 130–450)
PMV BLD AUTO: 11 FL (ref 7–12)
POTASSIUM SERPL-SCNC: 4.4 MMOL/L (ref 3.5–5)
RBC # BLD: 2.52 E12/L (ref 3.8–5.8)
SODIUM BLD-SCNC: 137 MMOL/L (ref 132–146)
WBC # BLD: 8.6 E9/L (ref 4.5–11.5)

## 2019-02-09 PROCEDURE — 36415 COLL VENOUS BLD VENIPUNCTURE: CPT

## 2019-02-09 PROCEDURE — 6360000002 HC RX W HCPCS: Performed by: SURGERY

## 2019-02-09 PROCEDURE — 85025 COMPLETE CBC W/AUTO DIFF WBC: CPT

## 2019-02-09 PROCEDURE — 6370000000 HC RX 637 (ALT 250 FOR IP): Performed by: INTERNAL MEDICINE

## 2019-02-09 PROCEDURE — 80048 BASIC METABOLIC PNL TOTAL CA: CPT

## 2019-02-09 PROCEDURE — 1200000000 HC SEMI PRIVATE

## 2019-02-09 PROCEDURE — C9113 INJ PANTOPRAZOLE SODIUM, VIA: HCPCS | Performed by: SURGERY

## 2019-02-09 PROCEDURE — 2580000003 HC RX 258: Performed by: INTERNAL MEDICINE

## 2019-02-09 PROCEDURE — 6370000000 HC RX 637 (ALT 250 FOR IP): Performed by: SURGERY

## 2019-02-09 RX ORDER — AMLODIPINE BESYLATE 5 MG/1
5 TABLET ORAL DAILY
Status: DISCONTINUED | OUTPATIENT
Start: 2019-02-09 | End: 2019-02-11 | Stop reason: HOSPADM

## 2019-02-09 RX ORDER — FUROSEMIDE 40 MG/1
20 TABLET ORAL
Qty: 60 TABLET | Refills: 0 | Status: CANCELLED | OUTPATIENT
Start: 2019-02-11

## 2019-02-09 RX ORDER — POTASSIUM CHLORIDE 20 MEQ/1
20 TABLET, EXTENDED RELEASE ORAL
Qty: 60 TABLET | Refills: 3 | Status: CANCELLED | OUTPATIENT
Start: 2019-02-11

## 2019-02-09 RX ORDER — LISINOPRIL 10 MG/1
10 TABLET ORAL DAILY
Status: DISCONTINUED | OUTPATIENT
Start: 2019-02-09 | End: 2019-02-09

## 2019-02-09 RX ORDER — SUCRALFATE 1 G/1
1 TABLET ORAL
Qty: 90 TABLET | Refills: 3 | Status: SHIPPED | OUTPATIENT
Start: 2019-02-09 | End: 2019-10-31

## 2019-02-09 RX ORDER — PANTOPRAZOLE SODIUM 40 MG/1
40 TABLET, DELAYED RELEASE ORAL
Qty: 30 TABLET | Refills: 5 | Status: SHIPPED | OUTPATIENT
Start: 2019-02-09 | End: 2021-01-01 | Stop reason: ALTCHOICE

## 2019-02-09 RX ADMIN — PRIMIDONE 50 MG: 50 TABLET ORAL at 14:05

## 2019-02-09 RX ADMIN — PANTOPRAZOLE SODIUM 40 MG: 40 INJECTION, POWDER, FOR SOLUTION INTRAVENOUS at 09:09

## 2019-02-09 RX ADMIN — PRIMIDONE 50 MG: 50 TABLET ORAL at 22:01

## 2019-02-09 RX ADMIN — SUCRALFATE 1 G: 1 TABLET ORAL at 09:08

## 2019-02-09 RX ADMIN — Medication 10 ML: at 09:09

## 2019-02-09 RX ADMIN — Medication 10 ML: at 14:07

## 2019-02-09 RX ADMIN — AMLODIPINE BESYLATE 5 MG: 5 TABLET ORAL at 14:43

## 2019-02-09 RX ADMIN — HYDRALAZINE HYDROCHLORIDE 50 MG: 50 TABLET, FILM COATED ORAL at 09:08

## 2019-02-09 RX ADMIN — SUCRALFATE 1 G: 1 TABLET ORAL at 17:51

## 2019-02-09 RX ADMIN — SUCRALFATE 1 G: 1 TABLET ORAL at 22:01

## 2019-02-09 RX ADMIN — LISINOPRIL 10 MG: 10 TABLET ORAL at 12:55

## 2019-02-09 RX ADMIN — METOPROLOL SUCCINATE 50 MG: 50 TABLET, FILM COATED, EXTENDED RELEASE ORAL at 09:08

## 2019-02-09 RX ADMIN — HYDRALAZINE HYDROCHLORIDE 50 MG: 50 TABLET, FILM COATED ORAL at 22:01

## 2019-02-09 RX ADMIN — PANTOPRAZOLE SODIUM 40 MG: 40 INJECTION, POWDER, FOR SOLUTION INTRAVENOUS at 22:00

## 2019-02-09 RX ADMIN — HYDRALAZINE HYDROCHLORIDE 50 MG: 50 TABLET, FILM COATED ORAL at 14:05

## 2019-02-09 RX ADMIN — SUCRALFATE 1 G: 1 TABLET ORAL at 12:55

## 2019-02-09 RX ADMIN — Medication 10 ML: at 22:02

## 2019-02-09 ASSESSMENT — PAIN SCALES - GENERAL
PAINLEVEL_OUTOF10: 0

## 2019-02-10 LAB
BASOPHILS ABSOLUTE: 0.06 E9/L (ref 0–0.2)
BASOPHILS RELATIVE PERCENT: 0.6 % (ref 0–2)
EOSINOPHILS ABSOLUTE: 0.6 E9/L (ref 0.05–0.5)
EOSINOPHILS RELATIVE PERCENT: 6.2 % (ref 0–6)
HCT VFR BLD CALC: 25.4 % (ref 37–54)
HEMOGLOBIN: 8 G/DL (ref 12.5–16.5)
IMMATURE GRANULOCYTES #: 0.07 E9/L
IMMATURE GRANULOCYTES %: 0.7 % (ref 0–5)
LYMPHOCYTES ABSOLUTE: 1.77 E9/L (ref 1.5–4)
LYMPHOCYTES RELATIVE PERCENT: 18.2 % (ref 20–42)
MCH RBC QN AUTO: 30.8 PG (ref 26–35)
MCHC RBC AUTO-ENTMCNC: 31.5 % (ref 32–34.5)
MCV RBC AUTO: 97.7 FL (ref 80–99.9)
MONOCYTES ABSOLUTE: 1 E9/L (ref 0.1–0.95)
MONOCYTES RELATIVE PERCENT: 10.3 % (ref 2–12)
NEUTROPHILS ABSOLUTE: 6.2 E9/L (ref 1.8–7.3)
NEUTROPHILS RELATIVE PERCENT: 64 % (ref 43–80)
PDW BLD-RTO: 16.3 FL (ref 11.5–15)
PLATELET # BLD: 218 E9/L (ref 130–450)
PMV BLD AUTO: 10.7 FL (ref 7–12)
RBC # BLD: 2.6 E12/L (ref 3.8–5.8)
WBC # BLD: 9.7 E9/L (ref 4.5–11.5)

## 2019-02-10 PROCEDURE — 2580000003 HC RX 258: Performed by: INTERNAL MEDICINE

## 2019-02-10 PROCEDURE — 36415 COLL VENOUS BLD VENIPUNCTURE: CPT

## 2019-02-10 PROCEDURE — 6370000000 HC RX 637 (ALT 250 FOR IP): Performed by: INTERNAL MEDICINE

## 2019-02-10 PROCEDURE — 6370000000 HC RX 637 (ALT 250 FOR IP): Performed by: SURGERY

## 2019-02-10 PROCEDURE — 1200000000 HC SEMI PRIVATE

## 2019-02-10 PROCEDURE — 6360000002 HC RX W HCPCS: Performed by: SURGERY

## 2019-02-10 PROCEDURE — C9113 INJ PANTOPRAZOLE SODIUM, VIA: HCPCS | Performed by: SURGERY

## 2019-02-10 PROCEDURE — 85025 COMPLETE CBC W/AUTO DIFF WBC: CPT

## 2019-02-10 RX ADMIN — SUCRALFATE 1 G: 1 TABLET ORAL at 13:27

## 2019-02-10 RX ADMIN — PRIMIDONE 50 MG: 50 TABLET ORAL at 22:25

## 2019-02-10 RX ADMIN — SUCRALFATE 1 G: 1 TABLET ORAL at 16:59

## 2019-02-10 RX ADMIN — AMLODIPINE BESYLATE 5 MG: 5 TABLET ORAL at 08:43

## 2019-02-10 RX ADMIN — Medication 10 ML: at 22:26

## 2019-02-10 RX ADMIN — Medication 10 ML: at 15:19

## 2019-02-10 RX ADMIN — METOPROLOL SUCCINATE 50 MG: 50 TABLET, FILM COATED, EXTENDED RELEASE ORAL at 08:44

## 2019-02-10 RX ADMIN — HYDRALAZINE HYDROCHLORIDE 50 MG: 50 TABLET, FILM COATED ORAL at 22:25

## 2019-02-10 RX ADMIN — SUCRALFATE 1 G: 1 TABLET ORAL at 08:44

## 2019-02-10 RX ADMIN — HYDRALAZINE HYDROCHLORIDE 50 MG: 50 TABLET, FILM COATED ORAL at 08:44

## 2019-02-10 RX ADMIN — PANTOPRAZOLE SODIUM 40 MG: 40 INJECTION, POWDER, FOR SOLUTION INTRAVENOUS at 22:26

## 2019-02-10 RX ADMIN — Medication 10 ML: at 08:44

## 2019-02-10 RX ADMIN — Medication 10 ML: at 03:04

## 2019-02-10 RX ADMIN — Medication 10 ML: at 08:47

## 2019-02-10 RX ADMIN — PRIMIDONE 50 MG: 50 TABLET ORAL at 08:44

## 2019-02-10 RX ADMIN — PRIMIDONE 50 MG: 50 TABLET ORAL at 13:26

## 2019-02-10 RX ADMIN — PANTOPRAZOLE SODIUM 40 MG: 40 INJECTION, POWDER, FOR SOLUTION INTRAVENOUS at 08:44

## 2019-02-10 RX ADMIN — HYDRALAZINE HYDROCHLORIDE 50 MG: 50 TABLET, FILM COATED ORAL at 13:27

## 2019-02-10 RX ADMIN — SUCRALFATE 1 G: 1 TABLET ORAL at 22:25

## 2019-02-10 ASSESSMENT — PAIN SCALES - GENERAL
PAINLEVEL_OUTOF10: 0
PAINLEVEL_OUTOF10: 0

## 2019-02-11 VITALS
DIASTOLIC BLOOD PRESSURE: 77 MMHG | HEIGHT: 69 IN | TEMPERATURE: 98.1 F | HEART RATE: 82 BPM | SYSTOLIC BLOOD PRESSURE: 156 MMHG | RESPIRATION RATE: 18 BRPM | WEIGHT: 235 LBS | OXYGEN SATURATION: 100 % | BODY MASS INDEX: 34.8 KG/M2

## 2019-02-11 PROCEDURE — 6370000000 HC RX 637 (ALT 250 FOR IP): Performed by: INTERNAL MEDICINE

## 2019-02-11 PROCEDURE — C9113 INJ PANTOPRAZOLE SODIUM, VIA: HCPCS | Performed by: SURGERY

## 2019-02-11 PROCEDURE — 2580000003 HC RX 258: Performed by: INTERNAL MEDICINE

## 2019-02-11 PROCEDURE — 6370000000 HC RX 637 (ALT 250 FOR IP): Performed by: SURGERY

## 2019-02-11 PROCEDURE — 6360000002 HC RX W HCPCS: Performed by: SURGERY

## 2019-02-11 RX ORDER — AMLODIPINE BESYLATE 5 MG/1
5 TABLET ORAL DAILY
Qty: 30 TABLET | Refills: 11 | Status: ON HOLD | OUTPATIENT
Start: 2019-02-11 | End: 2019-11-04 | Stop reason: HOSPADM

## 2019-02-11 RX ADMIN — Medication 10 ML: at 08:57

## 2019-02-11 RX ADMIN — METOPROLOL SUCCINATE 50 MG: 50 TABLET, FILM COATED, EXTENDED RELEASE ORAL at 08:56

## 2019-02-11 RX ADMIN — PANTOPRAZOLE SODIUM 40 MG: 40 INJECTION, POWDER, FOR SOLUTION INTRAVENOUS at 08:57

## 2019-02-11 RX ADMIN — HYDRALAZINE HYDROCHLORIDE 50 MG: 50 TABLET, FILM COATED ORAL at 08:56

## 2019-02-11 RX ADMIN — AMLODIPINE BESYLATE 5 MG: 5 TABLET ORAL at 08:56

## 2019-02-11 RX ADMIN — SUCRALFATE 1 G: 1 TABLET ORAL at 08:56

## 2019-02-11 RX ADMIN — PRIMIDONE 50 MG: 50 TABLET ORAL at 08:56

## 2019-02-11 ASSESSMENT — PAIN SCALES - GENERAL: PAINLEVEL_OUTOF10: 0

## 2019-02-12 ENCOUNTER — HOSPITAL ENCOUNTER (OUTPATIENT)
Age: 79
Setting detail: OBSERVATION
Discharge: HOME | End: 2019-02-15
Attending: EMERGENCY MEDICINE | Admitting: INTERNAL MEDICINE
Payer: MEDICARE

## 2019-02-12 ENCOUNTER — HOSPITAL ENCOUNTER (OUTPATIENT)
Age: 79
Discharge: HOME | End: 2019-02-12
Payer: MEDICARE

## 2019-02-12 ENCOUNTER — APPOINTMENT (OUTPATIENT)
Dept: GENERAL RADIOLOGY | Age: 79
End: 2019-02-12
Payer: MEDICARE

## 2019-02-12 ENCOUNTER — APPOINTMENT (OUTPATIENT)
Dept: CT IMAGING | Age: 79
End: 2019-02-12
Payer: MEDICARE

## 2019-02-12 DIAGNOSIS — R07.9 CHEST PAIN, UNSPECIFIED TYPE: Primary | ICD-10-CM

## 2019-02-12 DIAGNOSIS — I70.1: ICD-10-CM

## 2019-02-12 DIAGNOSIS — M10.00 IDIOPATHIC GOUT, UNSPECIFIED CHRONICITY, UNSPECIFIED SITE: ICD-10-CM

## 2019-02-12 LAB
ALBUMIN SERPL-MCNC: 3.8 G/DL (ref 3.5–5.2)
ALP BLD-CCNC: 79 U/L (ref 40–129)
ALT SERPL-CCNC: 39 U/L (ref 0–40)
ANION GAP SERPL CALCULATED.3IONS-SCNC: 15 MMOL/L (ref 7–16)
AST SERPL-CCNC: 30 U/L (ref 0–39)
BASOPHILS ABSOLUTE: 0.04 E9/L (ref 0–0.2)
BASOPHILS RELATIVE PERCENT: 0.3 % (ref 0–2)
BILIRUB SERPL-MCNC: 0.7 MG/DL (ref 0–1.2)
BUN BLDV-MCNC: 37 MG/DL (ref 8–23)
CALCIUM SERPL-MCNC: 9.2 MG/DL (ref 8.6–10.2)
CHLORIDE BLD-SCNC: 107 MMOL/L (ref 98–107)
CO2: 19 MMOL/L (ref 22–29)
CREAT SERPL-MCNC: 1.6 MG/DL (ref 0.7–1.2)
D DIMER: 481 NG/ML DDU
EOSINOPHILS ABSOLUTE: 0.23 E9/L (ref 0.05–0.5)
EOSINOPHILS RELATIVE PERCENT: 1.6 % (ref 0–6)
GFR AFRICAN AMERICAN: 51
GFR NON-AFRICAN AMERICAN: 42 ML/MIN/1.73
GLUCOSE BLD-MCNC: 117 MG/DL (ref 74–99)
HCT VFR BLD CALC: 27.5 % (ref 37–54)
HEMOGLOBIN: 8.7 G/DL (ref 12.5–16.5)
IMMATURE GRANULOCYTES #: 0.07 E9/L
IMMATURE GRANULOCYTES %: 0.5 % (ref 0–5)
LYMPHOCYTES ABSOLUTE: 1.38 E9/L (ref 1.5–4)
LYMPHOCYTES RELATIVE PERCENT: 9.9 % (ref 20–42)
MCH RBC QN AUTO: 30.6 PG (ref 26–35)
MCHC RBC AUTO-ENTMCNC: 31.6 % (ref 32–34.5)
MCV RBC AUTO: 96.8 FL (ref 80–99.9)
MONOCYTES ABSOLUTE: 1.41 E9/L (ref 0.1–0.95)
MONOCYTES RELATIVE PERCENT: 10.1 % (ref 2–12)
NEUTROPHILS ABSOLUTE: 10.84 E9/L (ref 1.8–7.3)
NEUTROPHILS RELATIVE PERCENT: 77.6 % (ref 43–80)
PDW BLD-RTO: 16 FL (ref 11.5–15)
PLATELET # BLD: 315 E9/L (ref 130–450)
PMV BLD AUTO: 10.5 FL (ref 7–12)
POTASSIUM REFLEX MAGNESIUM: 5.1 MMOL/L (ref 3.5–5)
PRO-BNP: 8244 PG/ML (ref 0–450)
RBC # BLD: 2.84 E12/L (ref 3.8–5.8)
SODIUM BLD-SCNC: 141 MMOL/L (ref 132–146)
TOTAL PROTEIN: 6.8 G/DL (ref 6.4–8.3)
TROPONIN: 0.02 NG/ML (ref 0–0.03)
WBC # BLD: 14 E9/L (ref 4.5–11.5)

## 2019-02-12 PROCEDURE — G0378 HOSPITAL OBSERVATION PER HR: HCPCS

## 2019-02-12 PROCEDURE — A0426 ALS 1: HCPCS

## 2019-02-12 PROCEDURE — 36415 COLL VENOUS BLD VENIPUNCTURE: CPT

## 2019-02-12 PROCEDURE — 83880 ASSAY OF NATRIURETIC PEPTIDE: CPT

## 2019-02-12 PROCEDURE — 71275 CT ANGIOGRAPHY CHEST: CPT

## 2019-02-12 PROCEDURE — 85378 FIBRIN DEGRADE SEMIQUANT: CPT

## 2019-02-12 PROCEDURE — 71046 X-RAY EXAM CHEST 2 VIEWS: CPT

## 2019-02-12 PROCEDURE — 6370000000 HC RX 637 (ALT 250 FOR IP): Performed by: INTERNAL MEDICINE

## 2019-02-12 PROCEDURE — 80053 COMPREHEN METABOLIC PANEL: CPT

## 2019-02-12 PROCEDURE — 84484 ASSAY OF TROPONIN QUANT: CPT

## 2019-02-12 PROCEDURE — 93005 ELECTROCARDIOGRAM TRACING: CPT | Performed by: EMERGENCY MEDICINE

## 2019-02-12 PROCEDURE — A0425 GROUND MILEAGE: HCPCS

## 2019-02-12 PROCEDURE — 6360000004 HC RX CONTRAST MEDICATION: Performed by: RADIOLOGY

## 2019-02-12 PROCEDURE — 85025 COMPLETE CBC W/AUTO DIFF WBC: CPT

## 2019-02-12 PROCEDURE — 99285 EMERGENCY DEPT VISIT HI MDM: CPT

## 2019-02-12 RX ORDER — PRIMIDONE 50 MG/1
50 TABLET ORAL 3 TIMES DAILY
Status: DISCONTINUED | OUTPATIENT
Start: 2019-02-12 | End: 2019-02-15 | Stop reason: HOSPADM

## 2019-02-12 RX ORDER — SUCRALFATE 1 G/1
1 TABLET ORAL
Status: DISCONTINUED | OUTPATIENT
Start: 2019-02-12 | End: 2019-02-15 | Stop reason: HOSPADM

## 2019-02-12 RX ORDER — ATORVASTATIN CALCIUM 40 MG/1
40 TABLET, FILM COATED ORAL DAILY
Status: DISCONTINUED | OUTPATIENT
Start: 2019-02-12 | End: 2019-02-15 | Stop reason: HOSPADM

## 2019-02-12 RX ORDER — PANTOPRAZOLE SODIUM 40 MG/1
40 TABLET, DELAYED RELEASE ORAL
Status: DISCONTINUED | OUTPATIENT
Start: 2019-02-13 | End: 2019-02-15 | Stop reason: HOSPADM

## 2019-02-12 RX ORDER — POTASSIUM CHLORIDE 20 MEQ/1
20 TABLET, EXTENDED RELEASE ORAL DAILY
Status: DISCONTINUED | OUTPATIENT
Start: 2019-02-12 | End: 2019-02-15 | Stop reason: HOSPADM

## 2019-02-12 RX ORDER — AMLODIPINE BESYLATE 5 MG/1
5 TABLET ORAL DAILY
Status: DISCONTINUED | OUTPATIENT
Start: 2019-02-12 | End: 2019-02-15 | Stop reason: HOSPADM

## 2019-02-12 RX ORDER — LISINOPRIL 20 MG/1
40 TABLET ORAL DAILY
Status: DISCONTINUED | OUTPATIENT
Start: 2019-02-12 | End: 2019-02-15 | Stop reason: HOSPADM

## 2019-02-12 RX ORDER — ASPIRIN 81 MG/1
81 TABLET ORAL 2 TIMES DAILY
Status: DISCONTINUED | OUTPATIENT
Start: 2019-02-12 | End: 2019-02-15 | Stop reason: HOSPADM

## 2019-02-12 RX ORDER — METOPROLOL SUCCINATE 50 MG/1
50 TABLET, EXTENDED RELEASE ORAL DAILY
Status: DISCONTINUED | OUTPATIENT
Start: 2019-02-12 | End: 2019-02-15 | Stop reason: HOSPADM

## 2019-02-12 RX ORDER — CALCITRIOL 0.25 UG/1
0.25 CAPSULE, LIQUID FILLED ORAL DAILY
Status: DISCONTINUED | OUTPATIENT
Start: 2019-02-12 | End: 2019-02-15 | Stop reason: HOSPADM

## 2019-02-12 RX ORDER — HYDRALAZINE HYDROCHLORIDE 50 MG/1
50 TABLET, FILM COATED ORAL 3 TIMES DAILY
Status: DISCONTINUED | OUTPATIENT
Start: 2019-02-12 | End: 2019-02-15 | Stop reason: HOSPADM

## 2019-02-12 RX ORDER — COLCHICINE 0.6 MG/1
0.6 TABLET ORAL 2 TIMES DAILY
Status: DISCONTINUED | OUTPATIENT
Start: 2019-02-12 | End: 2019-02-15 | Stop reason: HOSPADM

## 2019-02-12 RX ORDER — FUROSEMIDE 40 MG/1
40 TABLET ORAL DAILY
Status: DISCONTINUED | OUTPATIENT
Start: 2019-02-12 | End: 2019-02-15

## 2019-02-12 RX ADMIN — COLCHICINE 0.6 MG: 0.6 TABLET, FILM COATED ORAL at 21:04

## 2019-02-12 RX ADMIN — METOPROLOL SUCCINATE 50 MG: 50 TABLET, FILM COATED, EXTENDED RELEASE ORAL at 21:04

## 2019-02-12 RX ADMIN — ATORVASTATIN CALCIUM 40 MG: 40 TABLET, FILM COATED ORAL at 21:04

## 2019-02-12 RX ADMIN — ASPIRIN 81 MG: 81 TABLET ORAL at 21:04

## 2019-02-12 RX ADMIN — HYDRALAZINE HYDROCHLORIDE 50 MG: 50 TABLET, FILM COATED ORAL at 21:04

## 2019-02-12 RX ADMIN — PRIMIDONE 50 MG: 50 TABLET ORAL at 21:04

## 2019-02-12 RX ADMIN — CALCITRIOL 0.25 MCG: 0.25 CAPSULE ORAL at 21:04

## 2019-02-12 RX ADMIN — SUCRALFATE 1 G: 1 TABLET ORAL at 21:04

## 2019-02-12 RX ADMIN — IOPAMIDOL 90 ML: 755 INJECTION, SOLUTION INTRAVENOUS at 17:08

## 2019-02-12 ASSESSMENT — PAIN SCALES - GENERAL
PAINLEVEL_OUTOF10: 0

## 2019-02-12 ASSESSMENT — PAIN DESCRIPTION - LOCATION: LOCATION: CHEST

## 2019-02-12 ASSESSMENT — HEART SCORE: ECG: 1

## 2019-02-13 ENCOUNTER — APPOINTMENT (OUTPATIENT)
Dept: GENERAL RADIOLOGY | Age: 79
End: 2019-02-13
Payer: MEDICARE

## 2019-02-13 LAB
ANION GAP SERPL CALCULATED.3IONS-SCNC: 15 MMOL/L (ref 7–16)
BUN BLDV-MCNC: 40 MG/DL (ref 8–23)
CALCIUM SERPL-MCNC: 8.4 MG/DL (ref 8.6–10.2)
CHLORIDE BLD-SCNC: 105 MMOL/L (ref 98–107)
CO2: 21 MMOL/L (ref 22–29)
CREAT SERPL-MCNC: 1.7 MG/DL (ref 0.7–1.2)
GFR AFRICAN AMERICAN: 47
GFR NON-AFRICAN AMERICAN: 39 ML/MIN/1.73
GLUCOSE BLD-MCNC: 97 MG/DL (ref 74–99)
HCT VFR BLD CALC: 26.1 % (ref 37–54)
HEMOGLOBIN: 8.2 G/DL (ref 12.5–16.5)
MCH RBC QN AUTO: 30.6 PG (ref 26–35)
MCHC RBC AUTO-ENTMCNC: 31.4 % (ref 32–34.5)
MCV RBC AUTO: 97.4 FL (ref 80–99.9)
PDW BLD-RTO: 15.9 FL (ref 11.5–15)
PLATELET # BLD: 286 E9/L (ref 130–450)
PMV BLD AUTO: 9.7 FL (ref 7–12)
POTASSIUM SERPL-SCNC: 4.2 MMOL/L (ref 3.5–5)
RBC # BLD: 2.68 E12/L (ref 3.8–5.8)
SEDIMENTATION RATE, ERYTHROCYTE: 81 MM/HR (ref 0–15)
SODIUM BLD-SCNC: 141 MMOL/L (ref 132–146)
TROPONIN: 0.03 NG/ML (ref 0–0.03)
URIC ACID, SERUM: 11.6 MG/DL (ref 3.4–7)
WBC # BLD: 12.7 E9/L (ref 4.5–11.5)

## 2019-02-13 PROCEDURE — 36415 COLL VENOUS BLD VENIPUNCTURE: CPT

## 2019-02-13 PROCEDURE — 73630 X-RAY EXAM OF FOOT: CPT

## 2019-02-13 PROCEDURE — 6370000000 HC RX 637 (ALT 250 FOR IP): Performed by: INTERNAL MEDICINE

## 2019-02-13 PROCEDURE — 85651 RBC SED RATE NONAUTOMATED: CPT

## 2019-02-13 PROCEDURE — G0378 HOSPITAL OBSERVATION PER HR: HCPCS

## 2019-02-13 PROCEDURE — 73562 X-RAY EXAM OF KNEE 3: CPT

## 2019-02-13 PROCEDURE — 85027 COMPLETE CBC AUTOMATED: CPT

## 2019-02-13 PROCEDURE — 84484 ASSAY OF TROPONIN QUANT: CPT

## 2019-02-13 PROCEDURE — 84550 ASSAY OF BLOOD/URIC ACID: CPT

## 2019-02-13 PROCEDURE — 80048 BASIC METABOLIC PNL TOTAL CA: CPT

## 2019-02-13 RX ORDER — HYDROCODONE BITARTRATE AND ACETAMINOPHEN 5; 325 MG/1; MG/1
1 TABLET ORAL EVERY 4 HOURS PRN
Status: DISCONTINUED | OUTPATIENT
Start: 2019-02-13 | End: 2019-02-15 | Stop reason: HOSPADM

## 2019-02-13 RX ADMIN — COLCHICINE 0.6 MG: 0.6 TABLET, FILM COATED ORAL at 09:07

## 2019-02-13 RX ADMIN — HYDRALAZINE HYDROCHLORIDE 50 MG: 50 TABLET, FILM COATED ORAL at 18:02

## 2019-02-13 RX ADMIN — SUCRALFATE 1 G: 1 TABLET ORAL at 06:05

## 2019-02-13 RX ADMIN — PRIMIDONE 50 MG: 50 TABLET ORAL at 09:08

## 2019-02-13 RX ADMIN — FUROSEMIDE 40 MG: 40 TABLET ORAL at 09:07

## 2019-02-13 RX ADMIN — METOPROLOL SUCCINATE 50 MG: 50 TABLET, FILM COATED, EXTENDED RELEASE ORAL at 09:08

## 2019-02-13 RX ADMIN — PRIMIDONE 50 MG: 50 TABLET ORAL at 20:40

## 2019-02-13 RX ADMIN — AMLODIPINE BESYLATE 5 MG: 5 TABLET ORAL at 09:07

## 2019-02-13 RX ADMIN — CALCITRIOL 0.25 MCG: 0.25 CAPSULE ORAL at 09:07

## 2019-02-13 RX ADMIN — ASPIRIN 81 MG: 81 TABLET ORAL at 20:41

## 2019-02-13 RX ADMIN — LISINOPRIL 40 MG: 20 TABLET ORAL at 09:07

## 2019-02-13 RX ADMIN — PANTOPRAZOLE SODIUM 40 MG: 40 TABLET, DELAYED RELEASE ORAL at 06:05

## 2019-02-13 RX ADMIN — POTASSIUM CHLORIDE 20 MEQ: 1500 TABLET, EXTENDED RELEASE ORAL at 09:07

## 2019-02-13 RX ADMIN — ATORVASTATIN CALCIUM 40 MG: 40 TABLET, FILM COATED ORAL at 09:10

## 2019-02-13 RX ADMIN — RIVAROXABAN 15 MG: 15 TABLET, FILM COATED ORAL at 09:07

## 2019-02-13 RX ADMIN — HYDROCODONE BITARTRATE AND ACETAMINOPHEN 1 TABLET: 5; 325 TABLET ORAL at 22:42

## 2019-02-13 RX ADMIN — HYDRALAZINE HYDROCHLORIDE 50 MG: 50 TABLET, FILM COATED ORAL at 09:07

## 2019-02-13 RX ADMIN — COLCHICINE 0.6 MG: 0.6 TABLET, FILM COATED ORAL at 20:41

## 2019-02-13 RX ADMIN — PRIMIDONE 50 MG: 50 TABLET ORAL at 15:37

## 2019-02-13 RX ADMIN — HYDRALAZINE HYDROCHLORIDE 50 MG: 50 TABLET, FILM COATED ORAL at 21:04

## 2019-02-13 RX ADMIN — SUCRALFATE 1 G: 1 TABLET ORAL at 15:37

## 2019-02-13 RX ADMIN — SUCRALFATE 1 G: 1 TABLET ORAL at 10:52

## 2019-02-13 RX ADMIN — ASPIRIN 81 MG: 81 TABLET ORAL at 09:08

## 2019-02-13 ASSESSMENT — PAIN SCALES - GENERAL
PAINLEVEL_OUTOF10: 0
PAINLEVEL_OUTOF10: 5
PAINLEVEL_OUTOF10: 5
PAINLEVEL_OUTOF10: 0

## 2019-02-13 ASSESSMENT — PAIN DESCRIPTION - DESCRIPTORS: DESCRIPTORS: ACHING

## 2019-02-13 ASSESSMENT — PAIN DESCRIPTION - ONSET: ONSET: ON-GOING

## 2019-02-13 ASSESSMENT — PAIN DESCRIPTION - ORIENTATION: ORIENTATION: RIGHT

## 2019-02-13 ASSESSMENT — PAIN DESCRIPTION - PAIN TYPE: TYPE: ACUTE PAIN

## 2019-02-13 ASSESSMENT — PAIN DESCRIPTION - PROGRESSION: CLINICAL_PROGRESSION: NOT CHANGED

## 2019-02-13 ASSESSMENT — PAIN DESCRIPTION - LOCATION: LOCATION: FOOT;ANKLE;KNEE

## 2019-02-13 ASSESSMENT — PAIN DESCRIPTION - FREQUENCY: FREQUENCY: CONTINUOUS

## 2019-02-14 ENCOUNTER — APPOINTMENT (OUTPATIENT)
Dept: NUCLEAR MEDICINE | Age: 79
End: 2019-02-14
Payer: MEDICARE

## 2019-02-14 LAB
EKG ATRIAL RATE: 82 BPM
EKG Q-T INTERVAL: 396 MS
EKG QRS DURATION: 94 MS
EKG QTC CALCULATION (BAZETT): 451 MS
EKG R AXIS: 7 DEGREES
EKG T AXIS: 18 DEGREES
EKG VENTRICULAR RATE: 78 BPM
LV EF: 67 %
LVEF MODALITY: NORMAL

## 2019-02-14 PROCEDURE — G0378 HOSPITAL OBSERVATION PER HR: HCPCS

## 2019-02-14 PROCEDURE — 6360000002 HC RX W HCPCS: Performed by: INTERNAL MEDICINE

## 2019-02-14 PROCEDURE — 6370000000 HC RX 637 (ALT 250 FOR IP): Performed by: INTERNAL MEDICINE

## 2019-02-14 PROCEDURE — 78452 HT MUSCLE IMAGE SPECT MULT: CPT

## 2019-02-14 PROCEDURE — A9500 TC99M SESTAMIBI: HCPCS | Performed by: RADIOLOGY

## 2019-02-14 PROCEDURE — 3430000000 HC RX DIAGNOSTIC RADIOPHARMACEUTICAL: Performed by: RADIOLOGY

## 2019-02-14 PROCEDURE — 93017 CV STRESS TEST TRACING ONLY: CPT

## 2019-02-14 RX ADMIN — COLCHICINE 0.6 MG: 0.6 TABLET, FILM COATED ORAL at 20:26

## 2019-02-14 RX ADMIN — LISINOPRIL 40 MG: 20 TABLET ORAL at 08:47

## 2019-02-14 RX ADMIN — PRIMIDONE 50 MG: 50 TABLET ORAL at 08:47

## 2019-02-14 RX ADMIN — ASPIRIN 81 MG: 81 TABLET ORAL at 20:26

## 2019-02-14 RX ADMIN — HYDRALAZINE HYDROCHLORIDE 50 MG: 50 TABLET, FILM COATED ORAL at 20:26

## 2019-02-14 RX ADMIN — ATORVASTATIN CALCIUM 40 MG: 40 TABLET, FILM COATED ORAL at 20:26

## 2019-02-14 RX ADMIN — CALCITRIOL 0.25 MCG: 0.25 CAPSULE ORAL at 08:47

## 2019-02-14 RX ADMIN — Medication 35 MILLICURIE: at 13:22

## 2019-02-14 RX ADMIN — SUCRALFATE 1 G: 1 TABLET ORAL at 16:01

## 2019-02-14 RX ADMIN — PRIMIDONE 50 MG: 50 TABLET ORAL at 16:01

## 2019-02-14 RX ADMIN — HYDRALAZINE HYDROCHLORIDE 50 MG: 50 TABLET, FILM COATED ORAL at 08:46

## 2019-02-14 RX ADMIN — PRIMIDONE 50 MG: 50 TABLET ORAL at 20:26

## 2019-02-14 RX ADMIN — RIVAROXABAN 15 MG: 15 TABLET, FILM COATED ORAL at 08:46

## 2019-02-14 RX ADMIN — ASPIRIN 81 MG: 81 TABLET ORAL at 08:46

## 2019-02-14 RX ADMIN — POTASSIUM CHLORIDE 20 MEQ: 1500 TABLET, EXTENDED RELEASE ORAL at 08:47

## 2019-02-14 RX ADMIN — COLCHICINE 0.6 MG: 0.6 TABLET, FILM COATED ORAL at 08:47

## 2019-02-14 RX ADMIN — Medication 10.5 MILLICURIE: at 11:51

## 2019-02-14 RX ADMIN — FUROSEMIDE 40 MG: 40 TABLET ORAL at 08:46

## 2019-02-14 RX ADMIN — HYDRALAZINE HYDROCHLORIDE 50 MG: 50 TABLET, FILM COATED ORAL at 16:01

## 2019-02-14 RX ADMIN — REGADENOSON 0.4 MG: 0.08 INJECTION, SOLUTION INTRAVENOUS at 13:21

## 2019-02-14 RX ADMIN — AMLODIPINE BESYLATE 5 MG: 5 TABLET ORAL at 08:47

## 2019-02-14 ASSESSMENT — PAIN SCALES - GENERAL
PAINLEVEL_OUTOF10: 0
PAINLEVEL_OUTOF10: 5

## 2019-02-15 VITALS
HEIGHT: 69 IN | SYSTOLIC BLOOD PRESSURE: 172 MMHG | RESPIRATION RATE: 16 BRPM | WEIGHT: 225 LBS | OXYGEN SATURATION: 95 % | BODY MASS INDEX: 33.33 KG/M2 | HEART RATE: 88 BPM | TEMPERATURE: 98.7 F | DIASTOLIC BLOOD PRESSURE: 72 MMHG

## 2019-02-15 PROCEDURE — 97535 SELF CARE MNGMENT TRAINING: CPT

## 2019-02-15 PROCEDURE — 6370000000 HC RX 637 (ALT 250 FOR IP): Performed by: INTERNAL MEDICINE

## 2019-02-15 PROCEDURE — 97530 THERAPEUTIC ACTIVITIES: CPT

## 2019-02-15 PROCEDURE — G0378 HOSPITAL OBSERVATION PER HR: HCPCS

## 2019-02-15 PROCEDURE — 97162 PT EVAL MOD COMPLEX 30 MIN: CPT

## 2019-02-15 PROCEDURE — 97165 OT EVAL LOW COMPLEX 30 MIN: CPT

## 2019-02-15 RX ORDER — COLCHICINE 0.6 MG/1
0.6 TABLET ORAL 2 TIMES DAILY
Qty: 30 TABLET | Refills: 3 | Status: SHIPPED | OUTPATIENT
Start: 2019-02-15 | End: 2019-10-31

## 2019-02-15 RX ORDER — BUMETANIDE 2 MG/1
1 TABLET ORAL DAILY
Qty: 30 TABLET | Refills: 3 | Status: ON HOLD | OUTPATIENT
Start: 2019-02-15 | End: 2019-11-04 | Stop reason: HOSPADM

## 2019-02-15 RX ORDER — BUMETANIDE 1 MG/1
1 TABLET ORAL DAILY
Status: DISCONTINUED | OUTPATIENT
Start: 2019-02-16 | End: 2019-02-15 | Stop reason: HOSPADM

## 2019-02-15 RX ADMIN — METOPROLOL SUCCINATE 50 MG: 50 TABLET, FILM COATED, EXTENDED RELEASE ORAL at 09:10

## 2019-02-15 RX ADMIN — FUROSEMIDE 40 MG: 40 TABLET ORAL at 09:11

## 2019-02-15 RX ADMIN — AMLODIPINE BESYLATE 5 MG: 5 TABLET ORAL at 09:10

## 2019-02-15 RX ADMIN — SUCRALFATE 1 G: 1 TABLET ORAL at 06:32

## 2019-02-15 RX ADMIN — CALCITRIOL 0.25 MCG: 0.25 CAPSULE ORAL at 09:11

## 2019-02-15 RX ADMIN — HYDRALAZINE HYDROCHLORIDE 50 MG: 50 TABLET, FILM COATED ORAL at 09:11

## 2019-02-15 RX ADMIN — COLCHICINE 0.6 MG: 0.6 TABLET, FILM COATED ORAL at 09:10

## 2019-02-15 RX ADMIN — HYDRALAZINE HYDROCHLORIDE 50 MG: 50 TABLET, FILM COATED ORAL at 14:39

## 2019-02-15 RX ADMIN — POTASSIUM CHLORIDE 20 MEQ: 1500 TABLET, EXTENDED RELEASE ORAL at 09:10

## 2019-02-15 RX ADMIN — SUCRALFATE 1 G: 1 TABLET ORAL at 11:53

## 2019-02-15 RX ADMIN — PRIMIDONE 50 MG: 50 TABLET ORAL at 09:11

## 2019-02-15 RX ADMIN — ASPIRIN 81 MG: 81 TABLET ORAL at 09:10

## 2019-02-15 RX ADMIN — LISINOPRIL 40 MG: 20 TABLET ORAL at 09:10

## 2019-02-15 RX ADMIN — SUCRALFATE 1 G: 1 TABLET ORAL at 18:53

## 2019-02-15 RX ADMIN — PANTOPRAZOLE SODIUM 40 MG: 40 TABLET, DELAYED RELEASE ORAL at 06:32

## 2019-02-15 RX ADMIN — RIVAROXABAN 15 MG: 15 TABLET, FILM COATED ORAL at 09:11

## 2019-02-15 RX ADMIN — PRIMIDONE 50 MG: 50 TABLET ORAL at 14:39

## 2019-02-15 RX ADMIN — ATORVASTATIN CALCIUM 40 MG: 40 TABLET, FILM COATED ORAL at 09:10

## 2019-02-15 ASSESSMENT — PAIN SCALES - GENERAL: PAINLEVEL_OUTOF10: 6

## 2019-02-26 LAB
EKG ATRIAL RATE: 468 BPM
EKG Q-T INTERVAL: 402 MS
EKG QRS DURATION: 84 MS
EKG QTC CALCULATION (BAZETT): 454 MS
EKG R AXIS: 7 DEGREES
EKG T AXIS: 5 DEGREES
EKG VENTRICULAR RATE: 77 BPM

## 2019-10-31 ENCOUNTER — APPOINTMENT (OUTPATIENT)
Dept: ULTRASOUND IMAGING | Age: 79
DRG: 683 | End: 2019-10-31
Payer: MEDICARE

## 2019-10-31 ENCOUNTER — HOSPITAL ENCOUNTER (INPATIENT)
Age: 79
LOS: 4 days | Discharge: HOME | DRG: 683 | End: 2019-11-04
Attending: EMERGENCY MEDICINE | Admitting: INTERNAL MEDICINE
Payer: MEDICARE

## 2019-10-31 DIAGNOSIS — N17.9 ACUTE RENAL FAILURE SUPERIMPOSED ON CHRONIC KIDNEY DISEASE, UNSPECIFIED CKD STAGE, UNSPECIFIED ACUTE RENAL FAILURE TYPE (HCC): Primary | ICD-10-CM

## 2019-10-31 DIAGNOSIS — E87.5 HYPERKALEMIA: ICD-10-CM

## 2019-10-31 DIAGNOSIS — N18.9 ACUTE RENAL FAILURE SUPERIMPOSED ON CHRONIC KIDNEY DISEASE, UNSPECIFIED CKD STAGE, UNSPECIFIED ACUTE RENAL FAILURE TYPE (HCC): Primary | ICD-10-CM

## 2019-10-31 PROBLEM — E87.1 HYPONATREMIA: Status: ACTIVE | Noted: 2019-10-31

## 2019-10-31 PROBLEM — Z99.2 ACUTE RENAL FAILURE SUPERIMPOSED ON CHRONIC KIDNEY DISEASE, ON CHRONIC DIALYSIS (HCC): Status: ACTIVE | Noted: 2019-10-31

## 2019-10-31 LAB
ANION GAP SERPL CALCULATED.3IONS-SCNC: 13 MMOL/L (ref 7–16)
ANION GAP SERPL CALCULATED.3IONS-SCNC: 15 MMOL/L (ref 7–16)
BASOPHILS ABSOLUTE: 0.03 E9/L (ref 0–0.2)
BASOPHILS RELATIVE PERCENT: 0.4 % (ref 0–2)
BILIRUBIN URINE: NEGATIVE
BLOOD, URINE: NEGATIVE
BUN BLDV-MCNC: 126 MG/DL (ref 8–23)
BUN BLDV-MCNC: 133 MG/DL (ref 8–23)
CALCIUM SERPL-MCNC: 8.7 MG/DL (ref 8.6–10.2)
CALCIUM SERPL-MCNC: 8.8 MG/DL (ref 8.6–10.2)
CHLORIDE BLD-SCNC: 103 MMOL/L (ref 98–107)
CHLORIDE BLD-SCNC: 108 MMOL/L (ref 98–107)
CHLORIDE URINE RANDOM: 73 MMOL/L
CLARITY: CLEAR
CO2: 12 MMOL/L (ref 22–29)
CO2: 15 MMOL/L (ref 22–29)
COLOR: YELLOW
CREAT SERPL-MCNC: 4.4 MG/DL (ref 0.7–1.2)
CREAT SERPL-MCNC: 4.7 MG/DL (ref 0.7–1.2)
CREATININE URINE: 32 MG/DL (ref 40–278)
CREATININE URINE: 32 MG/DL (ref 40–278)
EKG ATRIAL RATE: 178 BPM
EKG Q-T INTERVAL: 356 MS
EKG QRS DURATION: 82 MS
EKG QTC CALCULATION (BAZETT): 402 MS
EKG R AXIS: -3 DEGREES
EKG T AXIS: 36 DEGREES
EKG VENTRICULAR RATE: 77 BPM
EOSINOPHILS ABSOLUTE: 0.26 E9/L (ref 0.05–0.5)
EOSINOPHILS RELATIVE PERCENT: 3.1 % (ref 0–6)
GFR AFRICAN AMERICAN: 15
GFR AFRICAN AMERICAN: 16
GFR NON-AFRICAN AMERICAN: 12 ML/MIN/1.73
GFR NON-AFRICAN AMERICAN: 13 ML/MIN/1.73
GLUCOSE BLD-MCNC: 58 MG/DL (ref 74–99)
GLUCOSE BLD-MCNC: 88 MG/DL (ref 74–99)
GLUCOSE URINE: NEGATIVE MG/DL
HCT VFR BLD CALC: 34.2 % (ref 37–54)
HEMOGLOBIN: 11.2 G/DL (ref 12.5–16.5)
IMMATURE GRANULOCYTES #: 0.02 E9/L
IMMATURE GRANULOCYTES %: 0.2 % (ref 0–5)
KETONES, URINE: NEGATIVE MG/DL
LEUKOCYTE ESTERASE, URINE: NEGATIVE
LYMPHOCYTES ABSOLUTE: 2.03 E9/L (ref 1.5–4)
LYMPHOCYTES RELATIVE PERCENT: 24.1 % (ref 20–42)
MCH RBC QN AUTO: 31.6 PG (ref 26–35)
MCHC RBC AUTO-ENTMCNC: 32.7 % (ref 32–34.5)
MCV RBC AUTO: 96.6 FL (ref 80–99.9)
METER GLUCOSE: 75 MG/DL (ref 74–99)
MONOCYTES ABSOLUTE: 0.82 E9/L (ref 0.1–0.95)
MONOCYTES RELATIVE PERCENT: 9.7 % (ref 2–12)
NEUTROPHILS ABSOLUTE: 5.27 E9/L (ref 1.8–7.3)
NEUTROPHILS RELATIVE PERCENT: 62.5 % (ref 43–80)
NITRITE, URINE: NEGATIVE
PDW BLD-RTO: 17.2 FL (ref 11.5–15)
PH UA: 5 (ref 5–9)
PLATELET # BLD: 176 E9/L (ref 130–450)
PMV BLD AUTO: 11.2 FL (ref 7–12)
POTASSIUM SERPL-SCNC: 5.5 MMOL/L (ref 3.5–5)
POTASSIUM SERPL-SCNC: 6.2 MMOL/L (ref 3.5–5)
POTASSIUM, UR: 13.1 MMOL/L
PROTEIN PROTEIN: <4 MG/DL (ref 0–12)
PROTEIN UA: NEGATIVE MG/DL
PROTEIN/CREAT RATIO: 0.1
PROTEIN/CREAT RATIO: 0.1 (ref 0–0.2)
RBC # BLD: 3.54 E12/L (ref 3.8–5.8)
SODIUM BLD-SCNC: 130 MMOL/L (ref 132–146)
SODIUM BLD-SCNC: 136 MMOL/L (ref 132–146)
SODIUM URINE: 74 MMOL/L
SPECIFIC GRAVITY UA: 1.01 (ref 1–1.03)
UROBILINOGEN, URINE: 0.2 E.U./DL
WBC # BLD: 8.4 E9/L (ref 4.5–11.5)

## 2019-10-31 PROCEDURE — 93010 ELECTROCARDIOGRAM REPORT: CPT | Performed by: INTERNAL MEDICINE

## 2019-10-31 PROCEDURE — 81003 URINALYSIS AUTO W/O SCOPE: CPT

## 2019-10-31 PROCEDURE — 99285 EMERGENCY DEPT VISIT HI MDM: CPT

## 2019-10-31 PROCEDURE — 6370000000 HC RX 637 (ALT 250 FOR IP): Performed by: EMERGENCY MEDICINE

## 2019-10-31 PROCEDURE — 82962 GLUCOSE BLOOD TEST: CPT

## 2019-10-31 PROCEDURE — 2500000003 HC RX 250 WO HCPCS: Performed by: EMERGENCY MEDICINE

## 2019-10-31 PROCEDURE — 36415 COLL VENOUS BLD VENIPUNCTURE: CPT

## 2019-10-31 PROCEDURE — 6360000002 HC RX W HCPCS: Performed by: EMERGENCY MEDICINE

## 2019-10-31 PROCEDURE — 2580000003 HC RX 258: Performed by: EMERGENCY MEDICINE

## 2019-10-31 PROCEDURE — 6370000000 HC RX 637 (ALT 250 FOR IP): Performed by: PHYSICIAN ASSISTANT

## 2019-10-31 PROCEDURE — 80048 BASIC METABOLIC PNL TOTAL CA: CPT

## 2019-10-31 PROCEDURE — 2060000000 HC ICU INTERMEDIATE R&B

## 2019-10-31 PROCEDURE — 76770 US EXAM ABDO BACK WALL COMP: CPT

## 2019-10-31 PROCEDURE — 87088 URINE BACTERIA CULTURE: CPT

## 2019-10-31 PROCEDURE — 84133 ASSAY OF URINE POTASSIUM: CPT

## 2019-10-31 PROCEDURE — 84156 ASSAY OF PROTEIN URINE: CPT

## 2019-10-31 PROCEDURE — 2580000003 HC RX 258: Performed by: PHYSICIAN ASSISTANT

## 2019-10-31 PROCEDURE — 85025 COMPLETE CBC W/AUTO DIFF WBC: CPT

## 2019-10-31 PROCEDURE — 82570 ASSAY OF URINE CREATININE: CPT

## 2019-10-31 PROCEDURE — 82436 ASSAY OF URINE CHLORIDE: CPT

## 2019-10-31 PROCEDURE — 84300 ASSAY OF URINE SODIUM: CPT

## 2019-10-31 PROCEDURE — 93005 ELECTROCARDIOGRAM TRACING: CPT | Performed by: EMERGENCY MEDICINE

## 2019-10-31 RX ORDER — CALCITRIOL 0.25 UG/1
0.25 CAPSULE, LIQUID FILLED ORAL DAILY
Status: DISCONTINUED | OUTPATIENT
Start: 2019-11-01 | End: 2019-11-04 | Stop reason: HOSPADM

## 2019-10-31 RX ORDER — AMLODIPINE BESYLATE 5 MG/1
5 TABLET ORAL DAILY
Status: DISCONTINUED | OUTPATIENT
Start: 2019-11-01 | End: 2019-11-02

## 2019-10-31 RX ORDER — PRIMIDONE 50 MG/1
50 TABLET ORAL 3 TIMES DAILY
Status: DISCONTINUED | OUTPATIENT
Start: 2019-10-31 | End: 2019-11-04 | Stop reason: HOSPADM

## 2019-10-31 RX ORDER — ACETAMINOPHEN 325 MG/1
650 TABLET ORAL EVERY 4 HOURS PRN
Status: DISCONTINUED | OUTPATIENT
Start: 2019-10-31 | End: 2019-11-04 | Stop reason: HOSPADM

## 2019-10-31 RX ORDER — DILTIAZEM HYDROCHLORIDE 180 MG/1
180 CAPSULE, COATED, EXTENDED RELEASE ORAL DAILY
Status: ON HOLD | COMMUNITY
End: 2019-11-04 | Stop reason: HOSPADM

## 2019-10-31 RX ORDER — HYDRALAZINE HYDROCHLORIDE 50 MG/1
50 TABLET, FILM COATED ORAL 3 TIMES DAILY
Status: DISCONTINUED | OUTPATIENT
Start: 2019-10-31 | End: 2019-11-04 | Stop reason: HOSPADM

## 2019-10-31 RX ORDER — DEXTROSE MONOHYDRATE 25 G/50ML
25 INJECTION, SOLUTION INTRAVENOUS ONCE
Status: COMPLETED | OUTPATIENT
Start: 2019-10-31 | End: 2019-10-31

## 2019-10-31 RX ORDER — SODIUM CHLORIDE 9 MG/ML
INJECTION, SOLUTION INTRAVENOUS CONTINUOUS
Status: DISCONTINUED | OUTPATIENT
Start: 2019-10-31 | End: 2019-11-01

## 2019-10-31 RX ORDER — HYDRALAZINE HYDROCHLORIDE 20 MG/ML
10 INJECTION INTRAMUSCULAR; INTRAVENOUS ONCE
Status: COMPLETED | OUTPATIENT
Start: 2019-10-31 | End: 2019-10-31

## 2019-10-31 RX ORDER — SODIUM CHLORIDE 0.9 % (FLUSH) 0.9 %
10 SYRINGE (ML) INJECTION EVERY 12 HOURS SCHEDULED
Status: DISCONTINUED | OUTPATIENT
Start: 2019-10-31 | End: 2019-11-04 | Stop reason: HOSPADM

## 2019-10-31 RX ORDER — PANTOPRAZOLE SODIUM 40 MG/1
40 TABLET, DELAYED RELEASE ORAL
Status: DISCONTINUED | OUTPATIENT
Start: 2019-11-01 | End: 2019-11-04 | Stop reason: HOSPADM

## 2019-10-31 RX ORDER — HYDRALAZINE HYDROCHLORIDE 50 MG/1
50 TABLET, FILM COATED ORAL ONCE
Status: DISCONTINUED | OUTPATIENT
Start: 2019-10-31 | End: 2019-10-31

## 2019-10-31 RX ORDER — SUCRALFATE 1 G/1
1 TABLET ORAL
Status: DISCONTINUED | OUTPATIENT
Start: 2019-10-31 | End: 2019-11-04 | Stop reason: HOSPADM

## 2019-10-31 RX ORDER — ASPIRIN 81 MG/1
81 TABLET ORAL 2 TIMES DAILY
Status: DISCONTINUED | OUTPATIENT
Start: 2019-10-31 | End: 2019-11-04 | Stop reason: HOSPADM

## 2019-10-31 RX ORDER — SODIUM CHLORIDE 0.9 % (FLUSH) 0.9 %
10 SYRINGE (ML) INJECTION PRN
Status: DISCONTINUED | OUTPATIENT
Start: 2019-10-31 | End: 2019-11-04 | Stop reason: HOSPADM

## 2019-10-31 RX ORDER — DEXTROSE MONOHYDRATE 25 G/50ML
12.5 INJECTION, SOLUTION INTRAVENOUS PRN
Status: DISCONTINUED | OUTPATIENT
Start: 2019-10-31 | End: 2019-11-01 | Stop reason: SDUPTHER

## 2019-10-31 RX ORDER — 0.9 % SODIUM CHLORIDE 0.9 %
1000 INTRAVENOUS SOLUTION INTRAVENOUS ONCE
Status: COMPLETED | OUTPATIENT
Start: 2019-10-31 | End: 2019-10-31

## 2019-10-31 RX ORDER — DEXTROSE MONOHYDRATE 50 MG/ML
100 INJECTION, SOLUTION INTRAVENOUS PRN
Status: DISCONTINUED | OUTPATIENT
Start: 2019-10-31 | End: 2019-11-04 | Stop reason: HOSPADM

## 2019-10-31 RX ORDER — ONDANSETRON 2 MG/ML
4 INJECTION INTRAMUSCULAR; INTRAVENOUS EVERY 6 HOURS PRN
Status: DISCONTINUED | OUTPATIENT
Start: 2019-10-31 | End: 2019-11-04 | Stop reason: HOSPADM

## 2019-10-31 RX ORDER — SODIUM POLYSTYRENE SULFONATE 4.1 MEQ/G
15 POWDER, FOR SUSPENSION ORAL; RECTAL ONCE
Status: COMPLETED | OUTPATIENT
Start: 2019-10-31 | End: 2019-10-31

## 2019-10-31 RX ORDER — NICOTINE POLACRILEX 4 MG
15 LOZENGE BUCCAL PRN
Status: DISCONTINUED | OUTPATIENT
Start: 2019-10-31 | End: 2019-11-04 | Stop reason: HOSPADM

## 2019-10-31 RX ORDER — ATORVASTATIN CALCIUM 40 MG/1
40 TABLET, FILM COATED ORAL DAILY
Status: DISCONTINUED | OUTPATIENT
Start: 2019-11-01 | End: 2019-11-04 | Stop reason: HOSPADM

## 2019-10-31 RX ORDER — FERROUS SULFATE 325(65) MG
325 TABLET ORAL 2 TIMES DAILY
COMMUNITY

## 2019-10-31 RX ORDER — METOPROLOL SUCCINATE 50 MG/1
50 TABLET, EXTENDED RELEASE ORAL DAILY
Status: DISCONTINUED | OUTPATIENT
Start: 2019-11-01 | End: 2019-11-04 | Stop reason: HOSPADM

## 2019-10-31 RX ORDER — ALLOPURINOL 300 MG/1
300 TABLET ORAL DAILY
COMMUNITY

## 2019-10-31 RX ADMIN — SODIUM CHLORIDE: 9 INJECTION, SOLUTION INTRAVENOUS at 22:34

## 2019-10-31 RX ADMIN — SODIUM BICARBONATE 50 MEQ: 84 INJECTION, SOLUTION INTRAVENOUS at 19:57

## 2019-10-31 RX ADMIN — SODIUM POLYSTYRENE SULFONATE 15 G: 1 POWDER ORAL; RECTAL at 19:57

## 2019-10-31 RX ADMIN — CALCIUM GLUCONATE 1 G: 98 INJECTION, SOLUTION INTRAVENOUS at 19:02

## 2019-10-31 RX ADMIN — INSULIN HUMAN 10 UNITS: 100 INJECTION, SOLUTION PARENTERAL at 19:57

## 2019-10-31 RX ADMIN — SUCRALFATE 1 G: 1 TABLET ORAL at 22:30

## 2019-10-31 RX ADMIN — Medication 10 ML: at 22:33

## 2019-10-31 RX ADMIN — PRIMIDONE 50 MG: 50 TABLET ORAL at 22:30

## 2019-10-31 RX ADMIN — SODIUM CHLORIDE 1000 ML: 9 INJECTION, SOLUTION INTRAVENOUS at 19:26

## 2019-10-31 RX ADMIN — ASPIRIN 81 MG: 81 TABLET, COATED ORAL at 22:30

## 2019-10-31 RX ADMIN — HYDRALAZINE HYDROCHLORIDE 10 MG: 20 INJECTION INTRAMUSCULAR; INTRAVENOUS at 20:27

## 2019-10-31 RX ADMIN — DEXTROSE MONOHYDRATE 25 G: 25 INJECTION, SOLUTION INTRAVENOUS at 19:56

## 2019-10-31 RX ADMIN — HYDRALAZINE HYDROCHLORIDE 50 MG: 50 TABLET, FILM COATED ORAL at 22:30

## 2019-10-31 ASSESSMENT — PAIN SCALES - GENERAL: PAINLEVEL_OUTOF10: 0

## 2019-11-01 PROBLEM — I48.20 ATRIAL FIBRILLATION, CHRONIC (HCC): Status: ACTIVE | Noted: 2019-11-01

## 2019-11-01 PROBLEM — Z79.01 CHRONIC ANTICOAGULATION: Status: ACTIVE | Noted: 2019-11-01

## 2019-11-01 LAB
ALBUMIN SERPL-MCNC: 4 G/DL (ref 3.5–5.2)
ALP BLD-CCNC: 65 U/L (ref 40–129)
ALT SERPL-CCNC: 18 U/L (ref 0–40)
ANION GAP SERPL CALCULATED.3IONS-SCNC: 12 MMOL/L (ref 7–16)
ANION GAP SERPL CALCULATED.3IONS-SCNC: 12 MMOL/L (ref 7–16)
ANION GAP SERPL CALCULATED.3IONS-SCNC: 14 MMOL/L (ref 7–16)
AST SERPL-CCNC: 17 U/L (ref 0–39)
BASOPHILS ABSOLUTE: 0.04 E9/L (ref 0–0.2)
BASOPHILS RELATIVE PERCENT: 0.5 % (ref 0–2)
BILIRUB SERPL-MCNC: 0.3 MG/DL (ref 0–1.2)
BUN BLDV-MCNC: 108 MG/DL (ref 8–23)
BUN BLDV-MCNC: 116 MG/DL (ref 8–23)
BUN BLDV-MCNC: 116 MG/DL (ref 8–23)
CALCIUM SERPL-MCNC: 8.6 MG/DL (ref 8.6–10.2)
CALCIUM SERPL-MCNC: 8.7 MG/DL (ref 8.6–10.2)
CALCIUM SERPL-MCNC: 8.7 MG/DL (ref 8.6–10.2)
CHLORIDE BLD-SCNC: 108 MMOL/L (ref 98–107)
CHLORIDE BLD-SCNC: 109 MMOL/L (ref 98–107)
CHLORIDE BLD-SCNC: 109 MMOL/L (ref 98–107)
CO2: 14 MMOL/L (ref 22–29)
CO2: 18 MMOL/L (ref 22–29)
CO2: 19 MMOL/L (ref 22–29)
CREAT SERPL-MCNC: 3.5 MG/DL (ref 0.7–1.2)
CREAT SERPL-MCNC: 3.7 MG/DL (ref 0.7–1.2)
CREAT SERPL-MCNC: 4.1 MG/DL (ref 0.7–1.2)
EOSINOPHIL, URINE: 0 % (ref 0–1)
EOSINOPHILS ABSOLUTE: 0.28 E9/L (ref 0.05–0.5)
EOSINOPHILS RELATIVE PERCENT: 3.2 % (ref 0–6)
GFR AFRICAN AMERICAN: 17
GFR AFRICAN AMERICAN: 19
GFR AFRICAN AMERICAN: 20
GFR NON-AFRICAN AMERICAN: 14 ML/MIN/1.73
GFR NON-AFRICAN AMERICAN: 16 ML/MIN/1.73
GFR NON-AFRICAN AMERICAN: 17 ML/MIN/1.73
GLUCOSE BLD-MCNC: 105 MG/DL (ref 74–99)
GLUCOSE BLD-MCNC: 135 MG/DL (ref 74–99)
GLUCOSE BLD-MCNC: 87 MG/DL (ref 74–99)
HCT VFR BLD CALC: 34.1 % (ref 37–54)
HEMOGLOBIN: 10.9 G/DL (ref 12.5–16.5)
IMMATURE GRANULOCYTES #: 0.02 E9/L
IMMATURE GRANULOCYTES %: 0.2 % (ref 0–5)
LYMPHOCYTES ABSOLUTE: 2.51 E9/L (ref 1.5–4)
LYMPHOCYTES RELATIVE PERCENT: 29.1 % (ref 20–42)
MCH RBC QN AUTO: 31.1 PG (ref 26–35)
MCHC RBC AUTO-ENTMCNC: 32 % (ref 32–34.5)
MCV RBC AUTO: 97.4 FL (ref 80–99.9)
METER GLUCOSE: 106 MG/DL (ref 74–99)
METER GLUCOSE: 110 MG/DL (ref 74–99)
METER GLUCOSE: 130 MG/DL (ref 74–99)
METER GLUCOSE: 94 MG/DL (ref 74–99)
MONOCYTES ABSOLUTE: 0.7 E9/L (ref 0.1–0.95)
MONOCYTES RELATIVE PERCENT: 8.1 % (ref 2–12)
NEUTROPHILS ABSOLUTE: 5.09 E9/L (ref 1.8–7.3)
NEUTROPHILS RELATIVE PERCENT: 58.9 % (ref 43–80)
PDW BLD-RTO: 17.2 FL (ref 11.5–15)
PLATELET # BLD: 174 E9/L (ref 130–450)
PMV BLD AUTO: 11.8 FL (ref 7–12)
POTASSIUM REFLEX MAGNESIUM: 6.3 MMOL/L (ref 3.5–5)
POTASSIUM SERPL-SCNC: 5.8 MMOL/L (ref 3.5–5)
POTASSIUM SERPL-SCNC: 5.9 MMOL/L (ref 3.5–5)
RBC # BLD: 3.5 E12/L (ref 3.8–5.8)
SODIUM BLD-SCNC: 137 MMOL/L (ref 132–146)
SODIUM BLD-SCNC: 138 MMOL/L (ref 132–146)
SODIUM BLD-SCNC: 140 MMOL/L (ref 132–146)
TOTAL PROTEIN: 6.4 G/DL (ref 6.4–8.3)
WBC # BLD: 8.6 E9/L (ref 4.5–11.5)

## 2019-11-01 PROCEDURE — 2060000000 HC ICU INTERMEDIATE R&B

## 2019-11-01 PROCEDURE — 97165 OT EVAL LOW COMPLEX 30 MIN: CPT

## 2019-11-01 PROCEDURE — 82962 GLUCOSE BLOOD TEST: CPT

## 2019-11-01 PROCEDURE — 2580000003 HC RX 258: Performed by: INTERNAL MEDICINE

## 2019-11-01 PROCEDURE — 87205 SMEAR GRAM STAIN: CPT

## 2019-11-01 PROCEDURE — 6370000000 HC RX 637 (ALT 250 FOR IP): Performed by: INTERNAL MEDICINE

## 2019-11-01 PROCEDURE — 80048 BASIC METABOLIC PNL TOTAL CA: CPT

## 2019-11-01 PROCEDURE — 2580000003 HC RX 258: Performed by: EMERGENCY MEDICINE

## 2019-11-01 PROCEDURE — 85025 COMPLETE CBC W/AUTO DIFF WBC: CPT

## 2019-11-01 PROCEDURE — 80053 COMPREHEN METABOLIC PANEL: CPT

## 2019-11-01 PROCEDURE — 2580000003 HC RX 258: Performed by: PHYSICIAN ASSISTANT

## 2019-11-01 PROCEDURE — 6370000000 HC RX 637 (ALT 250 FOR IP): Performed by: PHYSICIAN ASSISTANT

## 2019-11-01 PROCEDURE — 6360000002 HC RX W HCPCS: Performed by: INTERNAL MEDICINE

## 2019-11-01 PROCEDURE — 36415 COLL VENOUS BLD VENIPUNCTURE: CPT

## 2019-11-01 PROCEDURE — 2500000003 HC RX 250 WO HCPCS: Performed by: INTERNAL MEDICINE

## 2019-11-01 RX ORDER — DEXTROSE MONOHYDRATE 25 G/50ML
25 INJECTION, SOLUTION INTRAVENOUS ONCE
Status: COMPLETED | OUTPATIENT
Start: 2019-11-01 | End: 2019-11-01

## 2019-11-01 RX ORDER — DEXTROSE MONOHYDRATE 25 G/50ML
12.5 INJECTION, SOLUTION INTRAVENOUS PRN
Status: DISCONTINUED | OUTPATIENT
Start: 2019-11-01 | End: 2019-11-04 | Stop reason: HOSPADM

## 2019-11-01 RX ADMIN — PANTOPRAZOLE SODIUM 40 MG: 40 TABLET, DELAYED RELEASE ORAL at 06:05

## 2019-11-01 RX ADMIN — INSULIN HUMAN 10 UNITS: 100 INJECTION, SOLUTION PARENTERAL at 10:01

## 2019-11-01 RX ADMIN — CALCIUM GLUCONATE 1 G: 98 INJECTION, SOLUTION INTRAVENOUS at 16:44

## 2019-11-01 RX ADMIN — Medication 10 ML: at 20:23

## 2019-11-01 RX ADMIN — PRIMIDONE 50 MG: 50 TABLET ORAL at 20:23

## 2019-11-01 RX ADMIN — HYDRALAZINE HYDROCHLORIDE 50 MG: 50 TABLET, FILM COATED ORAL at 15:18

## 2019-11-01 RX ADMIN — SODIUM BICARBONATE: 84 INJECTION, SOLUTION INTRAVENOUS at 11:45

## 2019-11-01 RX ADMIN — PRIMIDONE 50 MG: 50 TABLET ORAL at 10:00

## 2019-11-01 RX ADMIN — SUCRALFATE 1 G: 1 TABLET ORAL at 11:53

## 2019-11-01 RX ADMIN — ATORVASTATIN CALCIUM 40 MG: 40 TABLET, FILM COATED ORAL at 10:00

## 2019-11-01 RX ADMIN — DEXTROSE MONOHYDRATE 100 ML/HR: 50 INJECTION, SOLUTION INTRAVENOUS at 23:15

## 2019-11-01 RX ADMIN — SODIUM CHLORIDE: 9 INJECTION, SOLUTION INTRAVENOUS at 10:00

## 2019-11-01 RX ADMIN — INSULIN HUMAN 7 UNITS: 100 INJECTION, SOLUTION PARENTERAL at 22:13

## 2019-11-01 RX ADMIN — SUCRALFATE 1 G: 1 TABLET ORAL at 16:43

## 2019-11-01 RX ADMIN — SODIUM BICARBONATE 50 MEQ: 84 INJECTION, SOLUTION INTRAVENOUS at 10:57

## 2019-11-01 RX ADMIN — HYDRALAZINE HYDROCHLORIDE 50 MG: 50 TABLET, FILM COATED ORAL at 20:23

## 2019-11-01 RX ADMIN — RIVAROXABAN 15 MG: 15 TABLET, FILM COATED ORAL at 10:00

## 2019-11-01 RX ADMIN — CALCIUM GLUCONATE 1 G: 98 INJECTION, SOLUTION INTRAVENOUS at 10:01

## 2019-11-01 RX ADMIN — ASPIRIN 81 MG: 81 TABLET, COATED ORAL at 10:00

## 2019-11-01 RX ADMIN — METOPROLOL SUCCINATE 50 MG: 50 TABLET, EXTENDED RELEASE ORAL at 10:00

## 2019-11-01 RX ADMIN — CALCITRIOL 0.25 MCG: 0.25 CAPSULE ORAL at 10:00

## 2019-11-01 RX ADMIN — INSULIN HUMAN 10 UNITS: 100 INJECTION, SOLUTION PARENTERAL at 16:48

## 2019-11-01 RX ADMIN — DEXTROSE MONOHYDRATE 25 G: 25 INJECTION, SOLUTION INTRAVENOUS at 22:13

## 2019-11-01 RX ADMIN — AMLODIPINE BESYLATE 5 MG: 5 TABLET ORAL at 10:00

## 2019-11-01 RX ADMIN — Medication 10 ML: at 10:01

## 2019-11-01 RX ADMIN — SODIUM BICARBONATE 50 MEQ: 84 INJECTION, SOLUTION INTRAVENOUS at 16:44

## 2019-11-01 RX ADMIN — Medication 10 ML: at 22:13

## 2019-11-01 RX ADMIN — CALCIUM GLUCONATE 1 G: 98 INJECTION, SOLUTION INTRAVENOUS at 22:12

## 2019-11-01 RX ADMIN — SUCRALFATE 1 G: 1 TABLET ORAL at 06:05

## 2019-11-01 RX ADMIN — HYDRALAZINE HYDROCHLORIDE 50 MG: 50 TABLET, FILM COATED ORAL at 10:00

## 2019-11-01 RX ADMIN — PRIMIDONE 50 MG: 50 TABLET ORAL at 15:18

## 2019-11-01 RX ADMIN — ASPIRIN 81 MG: 81 TABLET, COATED ORAL at 20:23

## 2019-11-01 ASSESSMENT — PAIN SCALES - GENERAL
PAINLEVEL_OUTOF10: 0

## 2019-11-02 LAB
24HR URINE VOLUME (ML): 2400 ML
24HR URINE VOLUME (ML): 2400 ML
ALBUMIN SERPL-MCNC: 3.7 G/DL (ref 3.5–5.2)
ALP BLD-CCNC: 59 U/L (ref 40–129)
ALT SERPL-CCNC: 15 U/L (ref 0–40)
ANION GAP SERPL CALCULATED.3IONS-SCNC: 11 MMOL/L (ref 7–16)
ANION GAP SERPL CALCULATED.3IONS-SCNC: 12 MMOL/L (ref 7–16)
ANION GAP SERPL CALCULATED.3IONS-SCNC: 9 MMOL/L (ref 7–16)
AST SERPL-CCNC: 15 U/L (ref 0–39)
BASOPHILS ABSOLUTE: 0.02 E9/L (ref 0–0.2)
BASOPHILS RELATIVE PERCENT: 0.3 % (ref 0–2)
BILIRUB SERPL-MCNC: 0.3 MG/DL (ref 0–1.2)
BUN BLDV-MCNC: 102 MG/DL (ref 8–23)
BUN BLDV-MCNC: 74 MG/DL (ref 8–23)
BUN BLDV-MCNC: 96 MG/DL (ref 8–23)
CALCIUM SERPL-MCNC: 8.8 MG/DL (ref 8.6–10.2)
CALCIUM SERPL-MCNC: 9 MG/DL (ref 8.6–10.2)
CALCIUM SERPL-MCNC: 9.1 MG/DL (ref 8.6–10.2)
CHLORIDE BLD-SCNC: 105 MMOL/L (ref 98–107)
CHLORIDE BLD-SCNC: 105 MMOL/L (ref 98–107)
CHLORIDE BLD-SCNC: 109 MMOL/L (ref 98–107)
CO2: 21 MMOL/L (ref 22–29)
CO2: 22 MMOL/L (ref 22–29)
CO2: 25 MMOL/L (ref 22–29)
CREAT SERPL-MCNC: 2.5 MG/DL (ref 0.7–1.2)
CREAT SERPL-MCNC: 2.9 MG/DL (ref 0.7–1.2)
CREAT SERPL-MCNC: 2.9 MG/DL (ref 0.7–1.2)
CREAT SERPL-MCNC: 3.1 MG/DL (ref 0.7–1.2)
CREATININE 24 HOUR URINE: 1584 MG/24H (ref 980–2200)
CREATININE 24 HOUR URINE: 1608 MG/24H (ref 980–2200)
CREATININE CLEARANCE: 38.5 ML/MIN (ref 53–109)
EOSINOPHILS ABSOLUTE: 0.26 E9/L (ref 0.05–0.5)
EOSINOPHILS RELATIVE PERCENT: 3.7 % (ref 0–6)
FERRITIN: 144 NG/ML
GFR AFRICAN AMERICAN: 24
GFR AFRICAN AMERICAN: 25
GFR AFRICAN AMERICAN: 30
GFR NON-AFRICAN AMERICAN: 19 ML/MIN/1.73
GFR NON-AFRICAN AMERICAN: 21 ML/MIN/1.73
GFR NON-AFRICAN AMERICAN: 25 ML/MIN/1.73
GLUCOSE BLD-MCNC: 123 MG/DL (ref 74–99)
GLUCOSE BLD-MCNC: 125 MG/DL (ref 74–99)
GLUCOSE BLD-MCNC: 130 MG/DL (ref 74–99)
HCT VFR BLD CALC: 30.3 % (ref 37–54)
HEMOGLOBIN: 9.9 G/DL (ref 12.5–16.5)
IMMATURE GRANULOCYTES #: 0.02 E9/L
IMMATURE GRANULOCYTES %: 0.3 % (ref 0–5)
IRON SATURATION: 53 % (ref 20–55)
IRON: 110 MCG/DL (ref 59–158)
LYMPHOCYTES ABSOLUTE: 1.67 E9/L (ref 1.5–4)
LYMPHOCYTES RELATIVE PERCENT: 24.1 % (ref 20–42)
Lab: 24 HOURS
Lab: 24 HOURS
MAGNESIUM: 1.8 MG/DL (ref 1.6–2.6)
MCH RBC QN AUTO: 31.2 PG (ref 26–35)
MCHC RBC AUTO-ENTMCNC: 32.7 % (ref 32–34.5)
MCV RBC AUTO: 95.6 FL (ref 80–99.9)
METER GLUCOSE: 106 MG/DL (ref 74–99)
METER GLUCOSE: 116 MG/DL (ref 74–99)
METER GLUCOSE: 117 MG/DL (ref 74–99)
METER GLUCOSE: 118 MG/DL (ref 74–99)
MONOCYTES ABSOLUTE: 0.65 E9/L (ref 0.1–0.95)
MONOCYTES RELATIVE PERCENT: 9.4 % (ref 2–12)
NEUTROPHILS ABSOLUTE: 4.32 E9/L (ref 1.8–7.3)
NEUTROPHILS RELATIVE PERCENT: 62.2 % (ref 43–80)
PDW BLD-RTO: 16.9 FL (ref 11.5–15)
PHOSPHORUS: 3.6 MG/DL (ref 2.5–4.5)
PLATELET # BLD: 147 E9/L (ref 130–450)
PMV BLD AUTO: 11.8 FL (ref 7–12)
POTASSIUM REFLEX MAGNESIUM: 5.2 MMOL/L (ref 3.5–5)
POTASSIUM SERPL-SCNC: 5.2 MMOL/L (ref 3.5–5)
POTASSIUM SERPL-SCNC: 5.4 MMOL/L (ref 3.5–5)
PROTEIN 24 HOUR URINE: 0.12 G/24HR (ref 0–0.14)
RBC # BLD: 3.17 E12/L (ref 3.8–5.8)
SODIUM BLD-SCNC: 138 MMOL/L (ref 132–146)
SODIUM BLD-SCNC: 139 MMOL/L (ref 132–146)
SODIUM BLD-SCNC: 142 MMOL/L (ref 132–146)
SODIUM URINE: 51 MMOL/L
TOTAL IRON BINDING CAPACITY: 209 MCG/DL (ref 250–450)
TOTAL PROTEIN: 5.9 G/DL (ref 6.4–8.3)
URIC ACID, SERUM: 4.9 MG/DL (ref 3.4–7)
URINE CULTURE, ROUTINE: NORMAL
WBC # BLD: 6.9 E9/L (ref 4.5–11.5)

## 2019-11-02 PROCEDURE — 80048 BASIC METABOLIC PNL TOTAL CA: CPT

## 2019-11-02 PROCEDURE — 36415 COLL VENOUS BLD VENIPUNCTURE: CPT

## 2019-11-02 PROCEDURE — 2580000003 HC RX 258: Performed by: INTERNAL MEDICINE

## 2019-11-02 PROCEDURE — 2580000003 HC RX 258: Performed by: PHYSICIAN ASSISTANT

## 2019-11-02 PROCEDURE — 83540 ASSAY OF IRON: CPT

## 2019-11-02 PROCEDURE — 84550 ASSAY OF BLOOD/URIC ACID: CPT

## 2019-11-02 PROCEDURE — 6370000000 HC RX 637 (ALT 250 FOR IP): Performed by: PHYSICIAN ASSISTANT

## 2019-11-02 PROCEDURE — 84100 ASSAY OF PHOSPHORUS: CPT

## 2019-11-02 PROCEDURE — 83735 ASSAY OF MAGNESIUM: CPT

## 2019-11-02 PROCEDURE — 83550 IRON BINDING TEST: CPT

## 2019-11-02 PROCEDURE — 80053 COMPREHEN METABOLIC PANEL: CPT

## 2019-11-02 PROCEDURE — 2060000000 HC ICU INTERMEDIATE R&B

## 2019-11-02 PROCEDURE — 84156 ASSAY OF PROTEIN URINE: CPT

## 2019-11-02 PROCEDURE — 6370000000 HC RX 637 (ALT 250 FOR IP): Performed by: INTERNAL MEDICINE

## 2019-11-02 PROCEDURE — 84300 ASSAY OF URINE SODIUM: CPT

## 2019-11-02 PROCEDURE — 82962 GLUCOSE BLOOD TEST: CPT

## 2019-11-02 PROCEDURE — 82575 CREATININE CLEARANCE TEST: CPT

## 2019-11-02 PROCEDURE — 85025 COMPLETE CBC W/AUTO DIFF WBC: CPT

## 2019-11-02 PROCEDURE — 82728 ASSAY OF FERRITIN: CPT

## 2019-11-02 PROCEDURE — 2500000003 HC RX 250 WO HCPCS: Performed by: INTERNAL MEDICINE

## 2019-11-02 RX ORDER — TAMSULOSIN HYDROCHLORIDE 0.4 MG/1
0.4 CAPSULE ORAL DAILY
Status: DISCONTINUED | OUTPATIENT
Start: 2019-11-02 | End: 2019-11-04 | Stop reason: HOSPADM

## 2019-11-02 RX ORDER — HYDRALAZINE HYDROCHLORIDE 20 MG/ML
5 INJECTION INTRAMUSCULAR; INTRAVENOUS EVERY 4 HOURS PRN
Status: DISCONTINUED | OUTPATIENT
Start: 2019-11-02 | End: 2019-11-04 | Stop reason: HOSPADM

## 2019-11-02 RX ORDER — AMLODIPINE BESYLATE 10 MG/1
10 TABLET ORAL DAILY
Status: DISCONTINUED | OUTPATIENT
Start: 2019-11-03 | End: 2019-11-04 | Stop reason: HOSPADM

## 2019-11-02 RX ORDER — SODIUM CHLORIDE 9 MG/ML
INJECTION, SOLUTION INTRAVENOUS CONTINUOUS
Status: DISCONTINUED | OUTPATIENT
Start: 2019-11-02 | End: 2019-11-04 | Stop reason: HOSPADM

## 2019-11-02 RX ADMIN — HYDRALAZINE HYDROCHLORIDE 50 MG: 50 TABLET, FILM COATED ORAL at 08:59

## 2019-11-02 RX ADMIN — PRIMIDONE 50 MG: 50 TABLET ORAL at 14:17

## 2019-11-02 RX ADMIN — TAMSULOSIN HYDROCHLORIDE 0.4 MG: 0.4 CAPSULE ORAL at 14:17

## 2019-11-02 RX ADMIN — APIXABAN 2.5 MG: 2.5 TABLET, FILM COATED ORAL at 08:59

## 2019-11-02 RX ADMIN — ASPIRIN 81 MG: 81 TABLET, COATED ORAL at 20:50

## 2019-11-02 RX ADMIN — APIXABAN 2.5 MG: 2.5 TABLET, FILM COATED ORAL at 20:50

## 2019-11-02 RX ADMIN — PANTOPRAZOLE SODIUM 40 MG: 40 TABLET, DELAYED RELEASE ORAL at 06:09

## 2019-11-02 RX ADMIN — SUCRALFATE 1 G: 1 TABLET ORAL at 06:09

## 2019-11-02 RX ADMIN — PRIMIDONE 50 MG: 50 TABLET ORAL at 08:59

## 2019-11-02 RX ADMIN — SUCRALFATE 1 G: 1 TABLET ORAL at 11:25

## 2019-11-02 RX ADMIN — HYDRALAZINE HYDROCHLORIDE 50 MG: 50 TABLET, FILM COATED ORAL at 14:17

## 2019-11-02 RX ADMIN — ASPIRIN 81 MG: 81 TABLET, COATED ORAL at 08:59

## 2019-11-02 RX ADMIN — SODIUM CHLORIDE: 9 INJECTION, SOLUTION INTRAVENOUS at 14:17

## 2019-11-02 RX ADMIN — SUCRALFATE 1 G: 1 TABLET ORAL at 16:23

## 2019-11-02 RX ADMIN — HYDRALAZINE HYDROCHLORIDE 50 MG: 50 TABLET, FILM COATED ORAL at 20:49

## 2019-11-02 RX ADMIN — CALCITRIOL 0.25 MCG: 0.25 CAPSULE ORAL at 08:59

## 2019-11-02 RX ADMIN — METOPROLOL SUCCINATE 50 MG: 50 TABLET, EXTENDED RELEASE ORAL at 08:59

## 2019-11-02 RX ADMIN — AMLODIPINE BESYLATE 5 MG: 5 TABLET ORAL at 08:59

## 2019-11-02 RX ADMIN — SODIUM BICARBONATE: 84 INJECTION, SOLUTION INTRAVENOUS at 11:23

## 2019-11-02 RX ADMIN — Medication 10 ML: at 14:17

## 2019-11-02 RX ADMIN — PRIMIDONE 50 MG: 50 TABLET ORAL at 20:50

## 2019-11-02 RX ADMIN — ATORVASTATIN CALCIUM 40 MG: 40 TABLET, FILM COATED ORAL at 08:59

## 2019-11-02 ASSESSMENT — PAIN SCALES - GENERAL
PAINLEVEL_OUTOF10: 0

## 2019-11-03 LAB
ALBUMIN SERPL-MCNC: 3.9 G/DL (ref 3.5–5.2)
ALP BLD-CCNC: 60 U/L (ref 40–129)
ALT SERPL-CCNC: 17 U/L (ref 0–40)
ANION GAP SERPL CALCULATED.3IONS-SCNC: 11 MMOL/L (ref 7–16)
AST SERPL-CCNC: 19 U/L (ref 0–39)
BASOPHILS ABSOLUTE: 0.03 E9/L (ref 0–0.2)
BASOPHILS RELATIVE PERCENT: 0.3 % (ref 0–2)
BILIRUB SERPL-MCNC: 0.2 MG/DL (ref 0–1.2)
BUN BLDV-MCNC: 65 MG/DL (ref 8–23)
CALCIUM SERPL-MCNC: 8.9 MG/DL (ref 8.6–10.2)
CHLORIDE BLD-SCNC: 110 MMOL/L (ref 98–107)
CO2: 24 MMOL/L (ref 22–29)
CREAT SERPL-MCNC: 2.2 MG/DL (ref 0.7–1.2)
EOSINOPHILS ABSOLUTE: 0.31 E9/L (ref 0.05–0.5)
EOSINOPHILS RELATIVE PERCENT: 3.6 % (ref 0–6)
GFR AFRICAN AMERICAN: 35
GFR NON-AFRICAN AMERICAN: 29 ML/MIN/1.73
GLUCOSE BLD-MCNC: 97 MG/DL (ref 74–99)
HCT VFR BLD CALC: 31.2 % (ref 37–54)
HEMOGLOBIN: 10 G/DL (ref 12.5–16.5)
IMMATURE GRANULOCYTES #: 0.01 E9/L
IMMATURE GRANULOCYTES %: 0.1 % (ref 0–5)
LYMPHOCYTES ABSOLUTE: 2.02 E9/L (ref 1.5–4)
LYMPHOCYTES RELATIVE PERCENT: 23.4 % (ref 20–42)
MCH RBC QN AUTO: 31.6 PG (ref 26–35)
MCHC RBC AUTO-ENTMCNC: 32.1 % (ref 32–34.5)
MCV RBC AUTO: 98.7 FL (ref 80–99.9)
METER GLUCOSE: 112 MG/DL (ref 74–99)
METER GLUCOSE: 121 MG/DL (ref 74–99)
METER GLUCOSE: 94 MG/DL (ref 74–99)
METER GLUCOSE: 99 MG/DL (ref 74–99)
MONOCYTES ABSOLUTE: 0.81 E9/L (ref 0.1–0.95)
MONOCYTES RELATIVE PERCENT: 9.4 % (ref 2–12)
NEUTROPHILS ABSOLUTE: 5.45 E9/L (ref 1.8–7.3)
NEUTROPHILS RELATIVE PERCENT: 63.2 % (ref 43–80)
PDW BLD-RTO: 16.8 FL (ref 11.5–15)
PLATELET # BLD: 144 E9/L (ref 130–450)
PMV BLD AUTO: 11.7 FL (ref 7–12)
POTASSIUM REFLEX MAGNESIUM: 5.2 MMOL/L (ref 3.5–5)
RBC # BLD: 3.16 E12/L (ref 3.8–5.8)
SODIUM BLD-SCNC: 145 MMOL/L (ref 132–146)
TOTAL PROTEIN: 6 G/DL (ref 6.4–8.3)
WBC # BLD: 8.6 E9/L (ref 4.5–11.5)

## 2019-11-03 PROCEDURE — 36415 COLL VENOUS BLD VENIPUNCTURE: CPT

## 2019-11-03 PROCEDURE — 2580000003 HC RX 258: Performed by: INTERNAL MEDICINE

## 2019-11-03 PROCEDURE — 6370000000 HC RX 637 (ALT 250 FOR IP): Performed by: INTERNAL MEDICINE

## 2019-11-03 PROCEDURE — 2060000000 HC ICU INTERMEDIATE R&B

## 2019-11-03 PROCEDURE — 97530 THERAPEUTIC ACTIVITIES: CPT

## 2019-11-03 PROCEDURE — 6370000000 HC RX 637 (ALT 250 FOR IP): Performed by: PHYSICIAN ASSISTANT

## 2019-11-03 PROCEDURE — 85025 COMPLETE CBC W/AUTO DIFF WBC: CPT

## 2019-11-03 PROCEDURE — 82962 GLUCOSE BLOOD TEST: CPT

## 2019-11-03 PROCEDURE — 97116 GAIT TRAINING THERAPY: CPT

## 2019-11-03 PROCEDURE — 97161 PT EVAL LOW COMPLEX 20 MIN: CPT

## 2019-11-03 PROCEDURE — 80053 COMPREHEN METABOLIC PANEL: CPT

## 2019-11-03 RX ADMIN — PRIMIDONE 50 MG: 50 TABLET ORAL at 20:00

## 2019-11-03 RX ADMIN — SUCRALFATE 1 G: 1 TABLET ORAL at 06:00

## 2019-11-03 RX ADMIN — SODIUM CHLORIDE: 9 INJECTION, SOLUTION INTRAVENOUS at 16:12

## 2019-11-03 RX ADMIN — SUCRALFATE 1 G: 1 TABLET ORAL at 18:16

## 2019-11-03 RX ADMIN — HYDRALAZINE HYDROCHLORIDE 50 MG: 50 TABLET, FILM COATED ORAL at 20:00

## 2019-11-03 RX ADMIN — PANTOPRAZOLE SODIUM 40 MG: 40 TABLET, DELAYED RELEASE ORAL at 06:00

## 2019-11-03 RX ADMIN — HYDRALAZINE HYDROCHLORIDE 50 MG: 50 TABLET, FILM COATED ORAL at 08:30

## 2019-11-03 RX ADMIN — HYDRALAZINE HYDROCHLORIDE 50 MG: 50 TABLET, FILM COATED ORAL at 14:19

## 2019-11-03 RX ADMIN — ATORVASTATIN CALCIUM 40 MG: 40 TABLET, FILM COATED ORAL at 08:31

## 2019-11-03 RX ADMIN — CALCITRIOL 0.25 MCG: 0.25 CAPSULE ORAL at 08:31

## 2019-11-03 RX ADMIN — PRIMIDONE 50 MG: 50 TABLET ORAL at 14:19

## 2019-11-03 RX ADMIN — APIXABAN 2.5 MG: 2.5 TABLET, FILM COATED ORAL at 20:00

## 2019-11-03 RX ADMIN — SODIUM CHLORIDE: 9 INJECTION, SOLUTION INTRAVENOUS at 02:42

## 2019-11-03 RX ADMIN — TAMSULOSIN HYDROCHLORIDE 0.4 MG: 0.4 CAPSULE ORAL at 08:31

## 2019-11-03 RX ADMIN — ASPIRIN 81 MG: 81 TABLET, COATED ORAL at 08:31

## 2019-11-03 RX ADMIN — ASPIRIN 81 MG: 81 TABLET, COATED ORAL at 20:00

## 2019-11-03 RX ADMIN — AMLODIPINE BESYLATE 10 MG: 10 TABLET ORAL at 08:31

## 2019-11-03 RX ADMIN — PRIMIDONE 50 MG: 50 TABLET ORAL at 08:31

## 2019-11-03 RX ADMIN — APIXABAN 2.5 MG: 2.5 TABLET, FILM COATED ORAL at 08:31

## 2019-11-03 RX ADMIN — SUCRALFATE 1 G: 1 TABLET ORAL at 11:03

## 2019-11-03 RX ADMIN — METOPROLOL SUCCINATE 50 MG: 50 TABLET, EXTENDED RELEASE ORAL at 08:31

## 2019-11-03 ASSESSMENT — PAIN SCALES - GENERAL
PAINLEVEL_OUTOF10: 0

## 2019-11-04 VITALS
HEART RATE: 83 BPM | OXYGEN SATURATION: 97 % | RESPIRATION RATE: 16 BRPM | SYSTOLIC BLOOD PRESSURE: 160 MMHG | DIASTOLIC BLOOD PRESSURE: 86 MMHG | BODY MASS INDEX: 35.09 KG/M2 | HEIGHT: 69 IN | TEMPERATURE: 99.3 F | WEIGHT: 236.9 LBS

## 2019-11-04 LAB
ALBUMIN SERPL-MCNC: 3.7 G/DL (ref 3.5–5.2)
ALP BLD-CCNC: 63 U/L (ref 40–129)
ALT SERPL-CCNC: 18 U/L (ref 0–40)
ANION GAP SERPL CALCULATED.3IONS-SCNC: 12 MMOL/L (ref 7–16)
AST SERPL-CCNC: 20 U/L (ref 0–39)
BASOPHILS ABSOLUTE: 0.02 E9/L (ref 0–0.2)
BASOPHILS RELATIVE PERCENT: 0.2 % (ref 0–2)
BILIRUB SERPL-MCNC: 0.4 MG/DL (ref 0–1.2)
BUN BLDV-MCNC: 46 MG/DL (ref 8–23)
CALCIUM SERPL-MCNC: 8.7 MG/DL (ref 8.6–10.2)
CHLORIDE BLD-SCNC: 110 MMOL/L (ref 98–107)
CO2: 21 MMOL/L (ref 22–29)
CREAT SERPL-MCNC: 1.8 MG/DL (ref 0.7–1.2)
EOSINOPHILS ABSOLUTE: 0.27 E9/L (ref 0.05–0.5)
EOSINOPHILS RELATIVE PERCENT: 2.5 % (ref 0–6)
GFR AFRICAN AMERICAN: 44
GFR NON-AFRICAN AMERICAN: 36 ML/MIN/1.73
GLUCOSE BLD-MCNC: 100 MG/DL (ref 74–99)
HCT VFR BLD CALC: 31.7 % (ref 37–54)
HEMOGLOBIN: 10 G/DL (ref 12.5–16.5)
IMMATURE GRANULOCYTES #: 0.05 E9/L
IMMATURE GRANULOCYTES %: 0.5 % (ref 0–5)
LYMPHOCYTES ABSOLUTE: 1.5 E9/L (ref 1.5–4)
LYMPHOCYTES RELATIVE PERCENT: 14 % (ref 20–42)
MCH RBC QN AUTO: 31.3 PG (ref 26–35)
MCHC RBC AUTO-ENTMCNC: 31.5 % (ref 32–34.5)
MCV RBC AUTO: 99.4 FL (ref 80–99.9)
METER GLUCOSE: 101 MG/DL (ref 74–99)
METER GLUCOSE: 105 MG/DL (ref 74–99)
MONOCYTES ABSOLUTE: 0.94 E9/L (ref 0.1–0.95)
MONOCYTES RELATIVE PERCENT: 8.8 % (ref 2–12)
NEUTROPHILS ABSOLUTE: 7.94 E9/L (ref 1.8–7.3)
NEUTROPHILS RELATIVE PERCENT: 74 % (ref 43–80)
PDW BLD-RTO: 16.5 FL (ref 11.5–15)
PLATELET # BLD: 132 E9/L (ref 130–450)
PMV BLD AUTO: 11.4 FL (ref 7–12)
POTASSIUM REFLEX MAGNESIUM: 5.4 MMOL/L (ref 3.5–5)
RBC # BLD: 3.19 E12/L (ref 3.8–5.8)
SODIUM BLD-SCNC: 143 MMOL/L (ref 132–146)
TOTAL PROTEIN: 6 G/DL (ref 6.4–8.3)
WBC # BLD: 10.7 E9/L (ref 4.5–11.5)

## 2019-11-04 PROCEDURE — 6370000000 HC RX 637 (ALT 250 FOR IP): Performed by: INTERNAL MEDICINE

## 2019-11-04 PROCEDURE — 85025 COMPLETE CBC W/AUTO DIFF WBC: CPT

## 2019-11-04 PROCEDURE — 36415 COLL VENOUS BLD VENIPUNCTURE: CPT

## 2019-11-04 PROCEDURE — 6370000000 HC RX 637 (ALT 250 FOR IP): Performed by: PHYSICIAN ASSISTANT

## 2019-11-04 PROCEDURE — 2580000003 HC RX 258: Performed by: INTERNAL MEDICINE

## 2019-11-04 PROCEDURE — 80053 COMPREHEN METABOLIC PANEL: CPT

## 2019-11-04 PROCEDURE — 82962 GLUCOSE BLOOD TEST: CPT

## 2019-11-04 RX ORDER — METOPROLOL SUCCINATE 50 MG/1
50 TABLET, EXTENDED RELEASE ORAL DAILY
Qty: 30 TABLET | Refills: 3 | Status: SHIPPED | OUTPATIENT
Start: 2019-11-05

## 2019-11-04 RX ORDER — TAMSULOSIN HYDROCHLORIDE 0.4 MG/1
0.4 CAPSULE ORAL DAILY
Qty: 30 CAPSULE | Refills: 3 | Status: SHIPPED | OUTPATIENT
Start: 2019-11-05

## 2019-11-04 RX ORDER — AMLODIPINE BESYLATE 10 MG/1
10 TABLET ORAL DAILY
Qty: 30 TABLET | Refills: 3 | Status: SHIPPED | OUTPATIENT
Start: 2019-11-05

## 2019-11-04 RX ORDER — SUCRALFATE 1 G/1
1 TABLET ORAL
Qty: 120 TABLET | Refills: 3 | Status: ON HOLD | OUTPATIENT
Start: 2019-11-04 | End: 2019-12-19

## 2019-11-04 RX ADMIN — ASPIRIN 81 MG: 81 TABLET, COATED ORAL at 09:54

## 2019-11-04 RX ADMIN — CALCITRIOL 0.25 MCG: 0.25 CAPSULE ORAL at 09:53

## 2019-11-04 RX ADMIN — SUCRALFATE 1 G: 1 TABLET ORAL at 09:53

## 2019-11-04 RX ADMIN — PATIROMER 8.4 G: 8.4 POWDER, FOR SUSPENSION ORAL at 12:00

## 2019-11-04 RX ADMIN — HYDRALAZINE HYDROCHLORIDE 50 MG: 50 TABLET, FILM COATED ORAL at 13:26

## 2019-11-04 RX ADMIN — AMLODIPINE BESYLATE 10 MG: 10 TABLET ORAL at 09:53

## 2019-11-04 RX ADMIN — PANTOPRAZOLE SODIUM 40 MG: 40 TABLET, DELAYED RELEASE ORAL at 06:36

## 2019-11-04 RX ADMIN — METOPROLOL SUCCINATE 50 MG: 50 TABLET, EXTENDED RELEASE ORAL at 09:54

## 2019-11-04 RX ADMIN — HYDRALAZINE HYDROCHLORIDE 50 MG: 50 TABLET, FILM COATED ORAL at 09:53

## 2019-11-04 RX ADMIN — PRIMIDONE 50 MG: 50 TABLET ORAL at 13:26

## 2019-11-04 RX ADMIN — APIXABAN 2.5 MG: 2.5 TABLET, FILM COATED ORAL at 09:53

## 2019-11-04 RX ADMIN — SODIUM CHLORIDE: 9 INJECTION, SOLUTION INTRAVENOUS at 05:30

## 2019-11-04 RX ADMIN — SUCRALFATE 1 G: 1 TABLET ORAL at 06:36

## 2019-11-04 RX ADMIN — ATORVASTATIN CALCIUM 40 MG: 40 TABLET, FILM COATED ORAL at 09:53

## 2019-11-04 RX ADMIN — PRIMIDONE 50 MG: 50 TABLET ORAL at 09:53

## 2019-11-04 RX ADMIN — TAMSULOSIN HYDROCHLORIDE 0.4 MG: 0.4 CAPSULE ORAL at 09:54

## 2019-11-04 ASSESSMENT — PAIN SCALES - GENERAL: PAINLEVEL_OUTOF10: 0

## 2019-12-17 DIAGNOSIS — I65.23 BILATERAL CAROTID ARTERY STENOSIS: Primary | ICD-10-CM

## 2019-12-19 ENCOUNTER — APPOINTMENT (OUTPATIENT)
Dept: CT IMAGING | Age: 79
End: 2019-12-19
Payer: MEDICARE

## 2019-12-19 ENCOUNTER — HOSPITAL ENCOUNTER (INPATIENT)
Age: 79
LOS: 2 days | Discharge: HOME | DRG: 392 | End: 2019-12-21
Attending: INTERNAL MEDICINE | Admitting: INTERNAL MEDICINE
Payer: MEDICARE

## 2019-12-19 ENCOUNTER — HOSPITAL ENCOUNTER (EMERGENCY)
Age: 79
Discharge: ANOTHER HOSPITAL | End: 2019-12-19
Attending: FAMILY MEDICINE
Payer: MEDICARE

## 2019-12-19 VITALS
BODY MASS INDEX: 32.58 KG/M2 | TEMPERATURE: 97.5 F | HEIGHT: 69 IN | DIASTOLIC BLOOD PRESSURE: 86 MMHG | HEART RATE: 74 BPM | SYSTOLIC BLOOD PRESSURE: 198 MMHG | WEIGHT: 220 LBS | OXYGEN SATURATION: 95 % | RESPIRATION RATE: 16 BRPM

## 2019-12-19 DIAGNOSIS — K57.32 DIVERTICULITIS OF COLON: Primary | ICD-10-CM

## 2019-12-19 DIAGNOSIS — R65.10 SIRS (SYSTEMIC INFLAMMATORY RESPONSE SYNDROME) (HCC): ICD-10-CM

## 2019-12-19 LAB
ALBUMIN SERPL-MCNC: 4.3 G/DL (ref 3.5–5.2)
ALP BLD-CCNC: 111 U/L (ref 40–129)
ALT SERPL-CCNC: 22 U/L (ref 0–40)
ANION GAP SERPL CALCULATED.3IONS-SCNC: 13 MMOL/L (ref 7–16)
AST SERPL-CCNC: 27 U/L (ref 0–39)
BASOPHILS ABSOLUTE: 0.04 E9/L (ref 0–0.2)
BASOPHILS RELATIVE PERCENT: 0.2 % (ref 0–2)
BILIRUB SERPL-MCNC: 0.6 MG/DL (ref 0–1.2)
BILIRUBIN URINE: NEGATIVE
BLOOD, URINE: NEGATIVE
BUN BLDV-MCNC: 50 MG/DL (ref 8–23)
CALCIUM SERPL-MCNC: 9.6 MG/DL (ref 8.6–10.2)
CHLORIDE BLD-SCNC: 105 MMOL/L (ref 98–107)
CLARITY: CLEAR
CO2: 24 MMOL/L (ref 22–29)
COLOR: YELLOW
CREAT SERPL-MCNC: 1.5 MG/DL (ref 0.7–1.2)
EKG ATRIAL RATE: 394 BPM
EKG Q-T INTERVAL: 368 MS
EKG QRS DURATION: 90 MS
EKG QTC CALCULATION (BAZETT): 429 MS
EKG R AXIS: 0 DEGREES
EKG T AXIS: 22 DEGREES
EKG VENTRICULAR RATE: 82 BPM
EOSINOPHILS ABSOLUTE: 0.23 E9/L (ref 0.05–0.5)
EOSINOPHILS RELATIVE PERCENT: 1.3 % (ref 0–6)
GFR AFRICAN AMERICAN: 54
GFR NON-AFRICAN AMERICAN: 45 ML/MIN/1.73
GLUCOSE BLD-MCNC: 145 MG/DL (ref 74–99)
GLUCOSE URINE: NEGATIVE MG/DL
HCT VFR BLD CALC: 39.8 % (ref 37–54)
HEMOGLOBIN: 12.4 G/DL (ref 12.5–16.5)
IMMATURE GRANULOCYTES #: 0.1 E9/L
IMMATURE GRANULOCYTES %: 0.6 % (ref 0–5)
KETONES, URINE: NEGATIVE MG/DL
LACTIC ACID: 1.4 MMOL/L (ref 0.5–2.2)
LEUKOCYTE ESTERASE, URINE: NEGATIVE
LIPASE: 36 U/L (ref 13–60)
LYMPHOCYTES ABSOLUTE: 1.69 E9/L (ref 1.5–4)
LYMPHOCYTES RELATIVE PERCENT: 9.9 % (ref 20–42)
MCH RBC QN AUTO: 32 PG (ref 26–35)
MCHC RBC AUTO-ENTMCNC: 31.2 % (ref 32–34.5)
MCV RBC AUTO: 102.8 FL (ref 80–99.9)
MONOCYTES ABSOLUTE: 1.05 E9/L (ref 0.1–0.95)
MONOCYTES RELATIVE PERCENT: 6.1 % (ref 2–12)
NEUTROPHILS ABSOLUTE: 14.01 E9/L (ref 1.8–7.3)
NEUTROPHILS RELATIVE PERCENT: 81.9 % (ref 43–80)
NITRITE, URINE: NEGATIVE
PDW BLD-RTO: 15.5 FL (ref 11.5–15)
PH UA: 5 (ref 5–9)
PLATELET # BLD: 227 E9/L (ref 130–450)
PMV BLD AUTO: 11.2 FL (ref 7–12)
POTASSIUM SERPL-SCNC: 4.2 MMOL/L (ref 3.5–5)
PROTEIN UA: NEGATIVE MG/DL
RBC # BLD: 3.87 E12/L (ref 3.8–5.8)
SODIUM BLD-SCNC: 142 MMOL/L (ref 132–146)
SPECIFIC GRAVITY UA: 1.01 (ref 1–1.03)
TOTAL PROTEIN: 7.3 G/DL (ref 6.4–8.3)
TROPONIN: 0.01 NG/ML (ref 0–0.03)
UROBILINOGEN, URINE: 0.2 E.U./DL
WBC # BLD: 17.1 E9/L (ref 4.5–11.5)

## 2019-12-19 PROCEDURE — 83605 ASSAY OF LACTIC ACID: CPT

## 2019-12-19 PROCEDURE — 6370000000 HC RX 637 (ALT 250 FOR IP): Performed by: INTERNAL MEDICINE

## 2019-12-19 PROCEDURE — 2580000003 HC RX 258: Performed by: FAMILY MEDICINE

## 2019-12-19 PROCEDURE — 74176 CT ABD & PELVIS W/O CONTRAST: CPT

## 2019-12-19 PROCEDURE — 96365 THER/PROPH/DIAG IV INF INIT: CPT

## 2019-12-19 PROCEDURE — 2060000000 HC ICU INTERMEDIATE R&B

## 2019-12-19 PROCEDURE — 93005 ELECTROCARDIOGRAM TRACING: CPT | Performed by: FAMILY MEDICINE

## 2019-12-19 PROCEDURE — 6360000002 HC RX W HCPCS: Performed by: INTERNAL MEDICINE

## 2019-12-19 PROCEDURE — 85025 COMPLETE CBC W/AUTO DIFF WBC: CPT

## 2019-12-19 PROCEDURE — 80053 COMPREHEN METABOLIC PANEL: CPT

## 2019-12-19 PROCEDURE — 6360000002 HC RX W HCPCS: Performed by: FAMILY MEDICINE

## 2019-12-19 PROCEDURE — 81003 URINALYSIS AUTO W/O SCOPE: CPT

## 2019-12-19 PROCEDURE — 87088 URINE BACTERIA CULTURE: CPT

## 2019-12-19 PROCEDURE — 84484 ASSAY OF TROPONIN QUANT: CPT

## 2019-12-19 PROCEDURE — 96375 TX/PRO/DX INJ NEW DRUG ADDON: CPT

## 2019-12-19 PROCEDURE — 36415 COLL VENOUS BLD VENIPUNCTURE: CPT

## 2019-12-19 PROCEDURE — 83690 ASSAY OF LIPASE: CPT

## 2019-12-19 PROCEDURE — 93010 ELECTROCARDIOGRAM REPORT: CPT | Performed by: INTERNAL MEDICINE

## 2019-12-19 PROCEDURE — 2580000003 HC RX 258: Performed by: INTERNAL MEDICINE

## 2019-12-19 PROCEDURE — 99285 EMERGENCY DEPT VISIT HI MDM: CPT

## 2019-12-19 RX ORDER — MAGNESIUM SULFATE 1 G/100ML
1 INJECTION INTRAVENOUS PRN
Status: DISCONTINUED | OUTPATIENT
Start: 2019-12-19 | End: 2019-12-21 | Stop reason: HOSPADM

## 2019-12-19 RX ORDER — FERROUS SULFATE 325(65) MG
325 TABLET ORAL 2 TIMES DAILY
Status: DISCONTINUED | OUTPATIENT
Start: 2019-12-19 | End: 2019-12-21 | Stop reason: HOSPADM

## 2019-12-19 RX ORDER — PANTOPRAZOLE SODIUM 40 MG/1
40 TABLET, DELAYED RELEASE ORAL
Status: CANCELLED | OUTPATIENT
Start: 2019-12-19

## 2019-12-19 RX ORDER — SODIUM CHLORIDE 0.9 % (FLUSH) 0.9 %
10 SYRINGE (ML) INJECTION PRN
Status: CANCELLED | OUTPATIENT
Start: 2019-12-19

## 2019-12-19 RX ORDER — METOPROLOL SUCCINATE 25 MG/1
50 TABLET, EXTENDED RELEASE ORAL DAILY
Status: CANCELLED | OUTPATIENT
Start: 2019-12-19

## 2019-12-19 RX ORDER — PRIMIDONE 50 MG/1
100 TABLET ORAL 2 TIMES DAILY
Status: CANCELLED | OUTPATIENT
Start: 2019-12-19

## 2019-12-19 RX ORDER — TAMSULOSIN HYDROCHLORIDE 0.4 MG/1
0.4 CAPSULE ORAL DAILY
Status: DISCONTINUED | OUTPATIENT
Start: 2019-12-19 | End: 2019-12-21 | Stop reason: HOSPADM

## 2019-12-19 RX ORDER — CALCITRIOL 0.25 UG/1
0.25 CAPSULE, LIQUID FILLED ORAL DAILY
Status: CANCELLED | OUTPATIENT
Start: 2019-12-19

## 2019-12-19 RX ORDER — CHOLECALCIFEROL (VITAMIN D3) 50 MCG
2000 TABLET ORAL DAILY
Status: DISCONTINUED | OUTPATIENT
Start: 2019-12-19 | End: 2019-12-21 | Stop reason: HOSPADM

## 2019-12-19 RX ORDER — HYDRALAZINE HYDROCHLORIDE 50 MG/1
50 TABLET, FILM COATED ORAL 3 TIMES DAILY
Status: CANCELLED | OUTPATIENT
Start: 2019-12-19

## 2019-12-19 RX ORDER — AMLODIPINE BESYLATE 5 MG/1
10 TABLET ORAL DAILY
Status: CANCELLED | OUTPATIENT
Start: 2019-12-19

## 2019-12-19 RX ORDER — POTASSIUM CHLORIDE 1.5 G/1.77G
20 POWDER, FOR SOLUTION ORAL DAILY
COMMUNITY
End: 2020-03-10 | Stop reason: HOSPADM

## 2019-12-19 RX ORDER — ALLOPURINOL 300 MG/1
300 TABLET ORAL DAILY
Status: DISCONTINUED | OUTPATIENT
Start: 2019-12-19 | End: 2019-12-21 | Stop reason: HOSPADM

## 2019-12-19 RX ORDER — ONDANSETRON 2 MG/ML
4 INJECTION INTRAMUSCULAR; INTRAVENOUS ONCE
Status: COMPLETED | OUTPATIENT
Start: 2019-12-19 | End: 2019-12-19

## 2019-12-19 RX ORDER — SUCRALFATE 1 G/1
1 TABLET ORAL
Status: DISCONTINUED | OUTPATIENT
Start: 2019-12-19 | End: 2019-12-21 | Stop reason: HOSPADM

## 2019-12-19 RX ORDER — CALCITRIOL 0.25 UG/1
0.25 CAPSULE, LIQUID FILLED ORAL DAILY
Status: DISCONTINUED | OUTPATIENT
Start: 2019-12-19 | End: 2019-12-21 | Stop reason: HOSPADM

## 2019-12-19 RX ORDER — TAMSULOSIN HYDROCHLORIDE 0.4 MG/1
0.4 CAPSULE ORAL DAILY
Status: CANCELLED | OUTPATIENT
Start: 2019-12-19

## 2019-12-19 RX ORDER — ATORVASTATIN CALCIUM 40 MG/1
40 TABLET, FILM COATED ORAL NIGHTLY
Status: DISCONTINUED | OUTPATIENT
Start: 2019-12-19 | End: 2019-12-21 | Stop reason: HOSPADM

## 2019-12-19 RX ORDER — PRIMIDONE 50 MG/1
100 TABLET ORAL 2 TIMES DAILY
Status: DISCONTINUED | OUTPATIENT
Start: 2019-12-19 | End: 2019-12-21 | Stop reason: HOSPADM

## 2019-12-19 RX ORDER — POTASSIUM CHLORIDE 7.45 MG/ML
10 INJECTION INTRAVENOUS PRN
Status: CANCELLED | OUTPATIENT
Start: 2019-12-19

## 2019-12-19 RX ORDER — ASPIRIN 81 MG/1
162 TABLET ORAL EVERY MORNING
Status: DISCONTINUED | OUTPATIENT
Start: 2019-12-19 | End: 2019-12-21 | Stop reason: HOSPADM

## 2019-12-19 RX ORDER — SUCRALFATE 1 G/1
1 TABLET ORAL
Status: CANCELLED | OUTPATIENT
Start: 2019-12-19

## 2019-12-19 RX ORDER — METOPROLOL SUCCINATE 50 MG/1
50 TABLET, EXTENDED RELEASE ORAL DAILY
Status: DISCONTINUED | OUTPATIENT
Start: 2019-12-19 | End: 2019-12-21 | Stop reason: HOSPADM

## 2019-12-19 RX ORDER — SODIUM CHLORIDE 0.9 % (FLUSH) 0.9 %
10 SYRINGE (ML) INJECTION PRN
Status: DISCONTINUED | OUTPATIENT
Start: 2019-12-19 | End: 2019-12-21 | Stop reason: HOSPADM

## 2019-12-19 RX ORDER — SODIUM CHLORIDE 0.9 % (FLUSH) 0.9 %
10 SYRINGE (ML) INJECTION EVERY 12 HOURS SCHEDULED
Status: CANCELLED | OUTPATIENT
Start: 2019-12-19

## 2019-12-19 RX ORDER — ACETAMINOPHEN 325 MG/1
650 TABLET ORAL EVERY 4 HOURS PRN
Status: DISCONTINUED | OUTPATIENT
Start: 2019-12-19 | End: 2019-12-21 | Stop reason: HOSPADM

## 2019-12-19 RX ORDER — ASPIRIN 81 MG/1
162 TABLET ORAL EVERY MORNING
Status: CANCELLED | OUTPATIENT
Start: 2019-12-19

## 2019-12-19 RX ORDER — ONDANSETRON 2 MG/ML
4 INJECTION INTRAMUSCULAR; INTRAVENOUS EVERY 6 HOURS PRN
Status: CANCELLED | OUTPATIENT
Start: 2019-12-19

## 2019-12-19 RX ORDER — MORPHINE SULFATE 4 MG/ML
4 INJECTION, SOLUTION INTRAMUSCULAR; INTRAVENOUS ONCE
Status: COMPLETED | OUTPATIENT
Start: 2019-12-19 | End: 2019-12-19

## 2019-12-19 RX ORDER — SODIUM CHLORIDE 9 MG/ML
INJECTION, SOLUTION INTRAVENOUS CONTINUOUS
Status: CANCELLED | OUTPATIENT
Start: 2019-12-19

## 2019-12-19 RX ORDER — ONDANSETRON 2 MG/ML
4 INJECTION INTRAMUSCULAR; INTRAVENOUS EVERY 6 HOURS PRN
Status: DISCONTINUED | OUTPATIENT
Start: 2019-12-19 | End: 2019-12-21 | Stop reason: HOSPADM

## 2019-12-19 RX ORDER — ATORVASTATIN CALCIUM 40 MG/1
40 TABLET, FILM COATED ORAL NIGHTLY
Status: CANCELLED | OUTPATIENT
Start: 2019-12-19

## 2019-12-19 RX ORDER — 0.9 % SODIUM CHLORIDE 0.9 %
1000 INTRAVENOUS SOLUTION INTRAVENOUS ONCE
Status: COMPLETED | OUTPATIENT
Start: 2019-12-19 | End: 2019-12-19

## 2019-12-19 RX ORDER — HYDRALAZINE HYDROCHLORIDE 50 MG/1
50 TABLET, FILM COATED ORAL 3 TIMES DAILY
Status: DISCONTINUED | OUTPATIENT
Start: 2019-12-19 | End: 2019-12-21 | Stop reason: HOSPADM

## 2019-12-19 RX ORDER — SODIUM CHLORIDE 0.9 % (FLUSH) 0.9 %
10 SYRINGE (ML) INJECTION EVERY 12 HOURS SCHEDULED
Status: DISCONTINUED | OUTPATIENT
Start: 2019-12-19 | End: 2019-12-21 | Stop reason: HOSPADM

## 2019-12-19 RX ORDER — FERROUS SULFATE 325(65) MG
325 TABLET ORAL 2 TIMES DAILY
Status: CANCELLED | OUTPATIENT
Start: 2019-12-19

## 2019-12-19 RX ORDER — SODIUM CHLORIDE 9 MG/ML
INJECTION, SOLUTION INTRAVENOUS CONTINUOUS
Status: DISCONTINUED | OUTPATIENT
Start: 2019-12-19 | End: 2019-12-21

## 2019-12-19 RX ORDER — AMLODIPINE BESYLATE 10 MG/1
10 TABLET ORAL DAILY
Status: DISCONTINUED | OUTPATIENT
Start: 2019-12-19 | End: 2019-12-21 | Stop reason: HOSPADM

## 2019-12-19 RX ORDER — ALLOPURINOL 300 MG/1
300 TABLET ORAL DAILY
Status: CANCELLED | OUTPATIENT
Start: 2019-12-19

## 2019-12-19 RX ORDER — PANTOPRAZOLE SODIUM 40 MG/1
40 TABLET, DELAYED RELEASE ORAL
Status: DISCONTINUED | OUTPATIENT
Start: 2019-12-20 | End: 2019-12-21 | Stop reason: HOSPADM

## 2019-12-19 RX ORDER — FUROSEMIDE 40 MG/1
40 TABLET ORAL DAILY
COMMUNITY
End: 2021-01-01 | Stop reason: SDUPTHER

## 2019-12-19 RX ORDER — POTASSIUM CHLORIDE 7.45 MG/ML
10 INJECTION INTRAVENOUS PRN
Status: DISCONTINUED | OUTPATIENT
Start: 2019-12-19 | End: 2019-12-21 | Stop reason: HOSPADM

## 2019-12-19 RX ORDER — MAGNESIUM SULFATE 1 G/100ML
1 INJECTION INTRAVENOUS PRN
Status: CANCELLED | OUTPATIENT
Start: 2019-12-19

## 2019-12-19 RX ADMIN — SODIUM CHLORIDE: 9 INJECTION, SOLUTION INTRAVENOUS at 12:46

## 2019-12-19 RX ADMIN — ATORVASTATIN CALCIUM 40 MG: 40 TABLET, FILM COATED ORAL at 20:38

## 2019-12-19 RX ADMIN — SODIUM CHLORIDE, PRESERVATIVE FREE 10 ML: 5 INJECTION INTRAVENOUS at 20:39

## 2019-12-19 RX ADMIN — PIPERACILLIN AND TAZOBACTAM 3.38 G: 3; .375 INJECTION, POWDER, LYOPHILIZED, FOR SOLUTION INTRAVENOUS at 08:16

## 2019-12-19 RX ADMIN — PRIMIDONE 100 MG: 50 TABLET ORAL at 20:38

## 2019-12-19 RX ADMIN — PIPERACILLIN AND TAZOBACTAM 3.38 G: 3; .375 INJECTION, POWDER, FOR SOLUTION INTRAVENOUS at 16:06

## 2019-12-19 RX ADMIN — SUCRALFATE 1 G: 1 TABLET ORAL at 16:06

## 2019-12-19 RX ADMIN — HYDRALAZINE HYDROCHLORIDE 50 MG: 50 TABLET, FILM COATED ORAL at 20:38

## 2019-12-19 RX ADMIN — MORPHINE SULFATE 4 MG: 4 INJECTION, SOLUTION INTRAMUSCULAR; INTRAVENOUS at 08:20

## 2019-12-19 RX ADMIN — FERROUS SULFATE TAB 325 MG (65 MG ELEMENTAL FE) 325 MG: 325 (65 FE) TAB at 16:06

## 2019-12-19 RX ADMIN — HYDRALAZINE HYDROCHLORIDE 50 MG: 50 TABLET, FILM COATED ORAL at 16:02

## 2019-12-19 RX ADMIN — ONDANSETRON 4 MG: 2 INJECTION INTRAMUSCULAR; INTRAVENOUS at 08:20

## 2019-12-19 RX ADMIN — SODIUM CHLORIDE 1000 ML: 9 INJECTION, SOLUTION INTRAVENOUS at 06:40

## 2019-12-19 ASSESSMENT — PAIN DESCRIPTION - DESCRIPTORS
DESCRIPTORS: DISCOMFORT
DESCRIPTORS: DISCOMFORT;STABBING

## 2019-12-19 ASSESSMENT — PAIN SCALES - GENERAL
PAINLEVEL_OUTOF10: 0
PAINLEVEL_OUTOF10: 5
PAINLEVEL_OUTOF10: 6
PAINLEVEL_OUTOF10: 9
PAINLEVEL_OUTOF10: 5

## 2019-12-19 ASSESSMENT — PAIN DESCRIPTION - PAIN TYPE
TYPE: ACUTE PAIN
TYPE: ACUTE PAIN

## 2019-12-19 ASSESSMENT — PAIN DESCRIPTION - LOCATION
LOCATION: ABDOMEN
LOCATION: ABDOMEN

## 2019-12-20 LAB
ALBUMIN SERPL-MCNC: 3.7 G/DL (ref 3.5–5.2)
ALP BLD-CCNC: 90 U/L (ref 40–129)
ALT SERPL-CCNC: 37 U/L (ref 0–40)
ANION GAP SERPL CALCULATED.3IONS-SCNC: 9 MMOL/L (ref 7–16)
AST SERPL-CCNC: 35 U/L (ref 0–39)
BILIRUB SERPL-MCNC: 0.9 MG/DL (ref 0–1.2)
BUN BLDV-MCNC: 36 MG/DL (ref 8–23)
CALCIUM SERPL-MCNC: 8.8 MG/DL (ref 8.6–10.2)
CHLORIDE BLD-SCNC: 106 MMOL/L (ref 98–107)
CHLORIDE URINE RANDOM: <20 MMOL/L
CO2: 23 MMOL/L (ref 22–29)
CREAT SERPL-MCNC: 1.6 MG/DL (ref 0.7–1.2)
CREATININE URINE: 166 MG/DL (ref 40–278)
GFR AFRICAN AMERICAN: 50
GFR NON-AFRICAN AMERICAN: 42 ML/MIN/1.73
GLUCOSE BLD-MCNC: 111 MG/DL (ref 74–99)
HCT VFR BLD CALC: 39.3 % (ref 37–54)
HEMOGLOBIN: 11.8 G/DL (ref 12.5–16.5)
MAGNESIUM: 2.2 MG/DL (ref 1.6–2.6)
MCH RBC QN AUTO: 32 PG (ref 26–35)
MCHC RBC AUTO-ENTMCNC: 30 % (ref 32–34.5)
MCV RBC AUTO: 106.5 FL (ref 80–99.9)
OSMOLALITY URINE: 724 MOSM/KG (ref 300–900)
PDW BLD-RTO: 15.6 FL (ref 11.5–15)
PHOSPHORUS: 2.4 MG/DL (ref 2.5–4.5)
PLATELET # BLD: 182 E9/L (ref 130–450)
PMV BLD AUTO: 11.4 FL (ref 7–12)
POTASSIUM REFLEX MAGNESIUM: 4.2 MMOL/L (ref 3.5–5)
PROTEIN PROTEIN: 68 MG/DL (ref 0–12)
PROTEIN/CREAT RATIO: 0.4
PROTEIN/CREAT RATIO: 0.4 (ref 0–0.2)
RBC # BLD: 3.69 E12/L (ref 3.8–5.8)
SODIUM BLD-SCNC: 138 MMOL/L (ref 132–146)
SODIUM URINE: 28 MMOL/L
TOTAL PROTEIN: 6.5 G/DL (ref 6.4–8.3)
WBC # BLD: 16.7 E9/L (ref 4.5–11.5)

## 2019-12-20 PROCEDURE — 84156 ASSAY OF PROTEIN URINE: CPT

## 2019-12-20 PROCEDURE — 80053 COMPREHEN METABOLIC PANEL: CPT

## 2019-12-20 PROCEDURE — 2580000003 HC RX 258: Performed by: INTERNAL MEDICINE

## 2019-12-20 PROCEDURE — 1200000000 HC SEMI PRIVATE

## 2019-12-20 PROCEDURE — 85027 COMPLETE CBC AUTOMATED: CPT

## 2019-12-20 PROCEDURE — 97530 THERAPEUTIC ACTIVITIES: CPT

## 2019-12-20 PROCEDURE — 6360000002 HC RX W HCPCS: Performed by: INTERNAL MEDICINE

## 2019-12-20 PROCEDURE — 6370000000 HC RX 637 (ALT 250 FOR IP): Performed by: INTERNAL MEDICINE

## 2019-12-20 PROCEDURE — 97161 PT EVAL LOW COMPLEX 20 MIN: CPT

## 2019-12-20 PROCEDURE — 84100 ASSAY OF PHOSPHORUS: CPT

## 2019-12-20 PROCEDURE — 36415 COLL VENOUS BLD VENIPUNCTURE: CPT

## 2019-12-20 PROCEDURE — 84300 ASSAY OF URINE SODIUM: CPT

## 2019-12-20 PROCEDURE — 6370000000 HC RX 637 (ALT 250 FOR IP): Performed by: SURGERY

## 2019-12-20 PROCEDURE — 82570 ASSAY OF URINE CREATININE: CPT

## 2019-12-20 PROCEDURE — 83935 ASSAY OF URINE OSMOLALITY: CPT

## 2019-12-20 PROCEDURE — 82436 ASSAY OF URINE CHLORIDE: CPT

## 2019-12-20 PROCEDURE — 97165 OT EVAL LOW COMPLEX 30 MIN: CPT

## 2019-12-20 PROCEDURE — 83735 ASSAY OF MAGNESIUM: CPT

## 2019-12-20 RX ORDER — HYDRALAZINE HYDROCHLORIDE 20 MG/ML
10 INJECTION INTRAMUSCULAR; INTRAVENOUS EVERY 4 HOURS PRN
Status: DISCONTINUED | OUTPATIENT
Start: 2019-12-20 | End: 2019-12-21 | Stop reason: HOSPADM

## 2019-12-20 RX ORDER — SODIUM BICARBONATE 650 MG/1
650 TABLET ORAL 2 TIMES DAILY
Status: DISCONTINUED | OUTPATIENT
Start: 2019-12-20 | End: 2019-12-21 | Stop reason: HOSPADM

## 2019-12-20 RX ORDER — HYDRALAZINE HYDROCHLORIDE 20 MG/ML
10 INJECTION INTRAMUSCULAR; INTRAVENOUS EVERY 6 HOURS PRN
Status: DISCONTINUED | OUTPATIENT
Start: 2019-12-20 | End: 2019-12-20

## 2019-12-20 RX ORDER — LABETALOL HYDROCHLORIDE 5 MG/ML
10 INJECTION, SOLUTION INTRAVENOUS EVERY 4 HOURS PRN
Status: DISCONTINUED | OUTPATIENT
Start: 2019-12-20 | End: 2019-12-21 | Stop reason: HOSPADM

## 2019-12-20 RX ADMIN — PRIMIDONE 100 MG: 50 TABLET ORAL at 20:07

## 2019-12-20 RX ADMIN — HYDRALAZINE HYDROCHLORIDE 50 MG: 50 TABLET, FILM COATED ORAL at 10:19

## 2019-12-20 RX ADMIN — PIPERACILLIN AND TAZOBACTAM 3.38 G: 3; .375 INJECTION, POWDER, FOR SOLUTION INTRAVENOUS at 10:20

## 2019-12-20 RX ADMIN — FERROUS SULFATE TAB 325 MG (65 MG ELEMENTAL FE) 325 MG: 325 (65 FE) TAB at 10:19

## 2019-12-20 RX ADMIN — SODIUM CHLORIDE: 9 INJECTION, SOLUTION INTRAVENOUS at 00:13

## 2019-12-20 RX ADMIN — METOPROLOL SUCCINATE 50 MG: 50 TABLET, EXTENDED RELEASE ORAL at 10:19

## 2019-12-20 RX ADMIN — ACETAMINOPHEN 650 MG: 325 TABLET ORAL at 00:11

## 2019-12-20 RX ADMIN — CHOLECALCIFEROL TAB 50 MCG (2000 UNIT) 2000 UNITS: 50 TAB at 10:19

## 2019-12-20 RX ADMIN — ASPIRIN 162 MG: 81 TABLET, COATED ORAL at 10:19

## 2019-12-20 RX ADMIN — PIPERACILLIN AND TAZOBACTAM 3.38 G: 3; .375 INJECTION, POWDER, FOR SOLUTION INTRAVENOUS at 17:12

## 2019-12-20 RX ADMIN — PRIMIDONE 100 MG: 50 TABLET ORAL at 10:19

## 2019-12-20 RX ADMIN — SODIUM BICARBONATE 650 MG: 650 TABLET ORAL at 12:31

## 2019-12-20 RX ADMIN — ATORVASTATIN CALCIUM 40 MG: 40 TABLET, FILM COATED ORAL at 20:07

## 2019-12-20 RX ADMIN — CALCITRIOL 0.25 MCG: 0.25 CAPSULE ORAL at 10:19

## 2019-12-20 RX ADMIN — SUCRALFATE 1 G: 1 TABLET ORAL at 05:57

## 2019-12-20 RX ADMIN — ACETAMINOPHEN 650 MG: 325 TABLET ORAL at 10:19

## 2019-12-20 RX ADMIN — HYDRALAZINE HYDROCHLORIDE 50 MG: 50 TABLET, FILM COATED ORAL at 20:07

## 2019-12-20 RX ADMIN — ALLOPURINOL 300 MG: 300 TABLET ORAL at 10:19

## 2019-12-20 RX ADMIN — TAMSULOSIN HYDROCHLORIDE 0.4 MG: 0.4 CAPSULE ORAL at 10:19

## 2019-12-20 RX ADMIN — FERROUS SULFATE TAB 325 MG (65 MG ELEMENTAL FE) 325 MG: 325 (65 FE) TAB at 17:12

## 2019-12-20 RX ADMIN — ACETAMINOPHEN 650 MG: 325 TABLET ORAL at 20:07

## 2019-12-20 RX ADMIN — SODIUM BICARBONATE 650 MG: 650 TABLET ORAL at 20:07

## 2019-12-20 RX ADMIN — PIPERACILLIN AND TAZOBACTAM 3.38 G: 3; .375 INJECTION, POWDER, FOR SOLUTION INTRAVENOUS at 00:49

## 2019-12-20 RX ADMIN — ACETAMINOPHEN 650 MG: 325 TABLET ORAL at 14:51

## 2019-12-20 RX ADMIN — AMLODIPINE BESYLATE 10 MG: 10 TABLET ORAL at 10:19

## 2019-12-20 RX ADMIN — PANTOPRAZOLE SODIUM 40 MG: 40 TABLET, DELAYED RELEASE ORAL at 05:57

## 2019-12-20 RX ADMIN — SODIUM CHLORIDE, PRESERVATIVE FREE 10 ML: 5 INJECTION INTRAVENOUS at 10:20

## 2019-12-20 RX ADMIN — HYDRALAZINE HYDROCHLORIDE 50 MG: 50 TABLET, FILM COATED ORAL at 13:39

## 2019-12-20 ASSESSMENT — PAIN DESCRIPTION - FREQUENCY
FREQUENCY: INTERMITTENT
FREQUENCY: INTERMITTENT

## 2019-12-20 ASSESSMENT — PAIN SCALES - GENERAL
PAINLEVEL_OUTOF10: 3
PAINLEVEL_OUTOF10: 7
PAINLEVEL_OUTOF10: 5
PAINLEVEL_OUTOF10: 5
PAINLEVEL_OUTOF10: 0

## 2019-12-20 ASSESSMENT — PAIN DESCRIPTION - DESCRIPTORS
DESCRIPTORS: ACHING;DISCOMFORT
DESCRIPTORS: ACHING;STABBING

## 2019-12-20 ASSESSMENT — PAIN - FUNCTIONAL ASSESSMENT
PAIN_FUNCTIONAL_ASSESSMENT: PREVENTS OR INTERFERES SOME ACTIVE ACTIVITIES AND ADLS
PAIN_FUNCTIONAL_ASSESSMENT: PREVENTS OR INTERFERES SOME ACTIVE ACTIVITIES AND ADLS

## 2019-12-20 ASSESSMENT — PAIN DESCRIPTION - LOCATION
LOCATION: ABDOMEN
LOCATION: ABDOMEN

## 2019-12-20 ASSESSMENT — PAIN DESCRIPTION - PROGRESSION
CLINICAL_PROGRESSION: NOT CHANGED
CLINICAL_PROGRESSION: GRADUALLY WORSENING

## 2019-12-20 ASSESSMENT — PAIN DESCRIPTION - ONSET
ONSET: ON-GOING
ONSET: GRADUAL

## 2019-12-20 ASSESSMENT — PAIN DESCRIPTION - ORIENTATION: ORIENTATION: MID;LOWER

## 2019-12-20 ASSESSMENT — PAIN DESCRIPTION - PAIN TYPE
TYPE: ACUTE PAIN
TYPE: ACUTE PAIN

## 2019-12-21 VITALS
DIASTOLIC BLOOD PRESSURE: 78 MMHG | HEIGHT: 69 IN | BODY MASS INDEX: 34.51 KG/M2 | OXYGEN SATURATION: 96 % | RESPIRATION RATE: 18 BRPM | SYSTOLIC BLOOD PRESSURE: 162 MMHG | HEART RATE: 92 BPM | TEMPERATURE: 97.7 F | WEIGHT: 233 LBS

## 2019-12-21 PROBLEM — I73.9 PERIPHERAL VASCULAR DISEASE (HCC): Chronic | Status: RESOLVED | Noted: 2018-05-09 | Resolved: 2019-12-21

## 2019-12-21 PROBLEM — E66.9 OBESITY (BMI 30-39.9): Chronic | Status: ACTIVE | Noted: 2019-12-21

## 2019-12-21 PROBLEM — D62 ACUTE BLOOD LOSS ANEMIA: Status: RESOLVED | Noted: 2019-02-06 | Resolved: 2019-12-21

## 2019-12-21 PROBLEM — E87.5 HYPERKALEMIA: Status: RESOLVED | Noted: 2019-10-31 | Resolved: 2019-12-21

## 2019-12-21 PROBLEM — E78.5 HYPERLIPIDEMIA LDL GOAL <100: Chronic | Status: ACTIVE | Noted: 2019-12-21

## 2019-12-21 PROBLEM — I34.0 MILD MITRAL REGURGITATION: Chronic | Status: RESOLVED | Noted: 2019-02-08 | Resolved: 2019-12-21

## 2019-12-21 PROBLEM — E87.1 HYPONATREMIA: Status: RESOLVED | Noted: 2019-10-31 | Resolved: 2019-12-21

## 2019-12-21 PROBLEM — N17.9 AKI (ACUTE KIDNEY INJURY) (HCC): Status: RESOLVED | Noted: 2019-10-31 | Resolved: 2019-12-21

## 2019-12-21 PROBLEM — R07.9 CHEST PAIN: Status: RESOLVED | Noted: 2019-02-12 | Resolved: 2019-12-21

## 2019-12-21 PROBLEM — K92.2 UPPER GI BLEED: Status: RESOLVED | Noted: 2019-02-06 | Resolved: 2019-12-21

## 2019-12-21 PROBLEM — I73.9 PERIPHERAL VASCULAR DISEASE (HCC): Chronic | Status: RESOLVED | Noted: 2017-11-15 | Resolved: 2019-12-21

## 2019-12-21 PROBLEM — Z79.01 CHRONIC ANTICOAGULATION: Status: RESOLVED | Noted: 2019-11-01 | Resolved: 2019-12-21

## 2019-12-21 PROBLEM — K92.2 ACUTE UPPER GI BLEED: Status: RESOLVED | Noted: 2019-02-06 | Resolved: 2019-12-21

## 2019-12-21 PROBLEM — I65.29 CAROTID ARTERY STENOSIS: Chronic | Status: RESOLVED | Noted: 2017-11-15 | Resolved: 2019-12-21

## 2019-12-21 PROBLEM — I65.23 CAROTID ARTERY STENOSIS WITHOUT CEREBRAL INFARCTION, BILATERAL: Chronic | Status: RESOLVED | Noted: 2018-05-09 | Resolved: 2019-12-21

## 2019-12-21 PROBLEM — I65.23 CAROTID ARTERY STENOSIS WITHOUT CEREBRAL INFARCTION, BILATERAL: Chronic | Status: RESOLVED | Noted: 2017-11-17 | Resolved: 2019-12-21

## 2019-12-21 LAB
ANION GAP SERPL CALCULATED.3IONS-SCNC: 12 MMOL/L (ref 7–16)
BUN BLDV-MCNC: 29 MG/DL (ref 8–23)
CALCIUM SERPL-MCNC: 8.7 MG/DL (ref 8.6–10.2)
CHLORIDE BLD-SCNC: 104 MMOL/L (ref 98–107)
CHLORIDE URINE RANDOM: <20 MMOL/L
CO2: 22 MMOL/L (ref 22–29)
CREAT SERPL-MCNC: 1.6 MG/DL (ref 0.7–1.2)
CREATININE URINE: 202 MG/DL (ref 40–278)
GFR AFRICAN AMERICAN: 50
GFR NON-AFRICAN AMERICAN: 42 ML/MIN/1.73
GLUCOSE BLD-MCNC: 118 MG/DL (ref 74–99)
HCT VFR BLD CALC: 36.2 % (ref 37–54)
HEMOGLOBIN: 11.2 G/DL (ref 12.5–16.5)
MAGNESIUM: 2.3 MG/DL (ref 1.6–2.6)
MCH RBC QN AUTO: 32.6 PG (ref 26–35)
MCHC RBC AUTO-ENTMCNC: 30.9 % (ref 32–34.5)
MCV RBC AUTO: 105.2 FL (ref 80–99.9)
OSMOLALITY URINE: 722 MOSM/KG (ref 300–900)
PDW BLD-RTO: 15.2 FL (ref 11.5–15)
PHOSPHORUS: 2.5 MG/DL (ref 2.5–4.5)
PLATELET # BLD: 156 E9/L (ref 130–450)
PMV BLD AUTO: 11.6 FL (ref 7–12)
POTASSIUM SERPL-SCNC: 3.9 MMOL/L (ref 3.5–5)
PROTEIN PROTEIN: 78 MG/DL (ref 0–12)
PROTEIN/CREAT RATIO: 0.4
PROTEIN/CREAT RATIO: 0.4 (ref 0–0.2)
RBC # BLD: 3.44 E12/L (ref 3.8–5.8)
SODIUM BLD-SCNC: 138 MMOL/L (ref 132–146)
SODIUM URINE: <20 MMOL/L
URINE CULTURE, ROUTINE: NORMAL
WBC # BLD: 11.7 E9/L (ref 4.5–11.5)

## 2019-12-21 PROCEDURE — 6370000000 HC RX 637 (ALT 250 FOR IP): Performed by: SURGERY

## 2019-12-21 PROCEDURE — 84100 ASSAY OF PHOSPHORUS: CPT

## 2019-12-21 PROCEDURE — 85027 COMPLETE CBC AUTOMATED: CPT

## 2019-12-21 PROCEDURE — 6370000000 HC RX 637 (ALT 250 FOR IP): Performed by: INTERNAL MEDICINE

## 2019-12-21 PROCEDURE — 6360000002 HC RX W HCPCS: Performed by: INTERNAL MEDICINE

## 2019-12-21 PROCEDURE — 80048 BASIC METABOLIC PNL TOTAL CA: CPT

## 2019-12-21 PROCEDURE — 83735 ASSAY OF MAGNESIUM: CPT

## 2019-12-21 PROCEDURE — 36415 COLL VENOUS BLD VENIPUNCTURE: CPT

## 2019-12-21 PROCEDURE — 2580000003 HC RX 258: Performed by: INTERNAL MEDICINE

## 2019-12-21 RX ORDER — CEFDINIR 300 MG/1
300 CAPSULE ORAL 2 TIMES DAILY
Qty: 20 CAPSULE | Refills: 0 | Status: SHIPPED | OUTPATIENT
Start: 2019-12-21 | End: 2019-12-31

## 2019-12-21 RX ORDER — POLYETHYLENE GLYCOL 3350 17 G/17G
17 POWDER, FOR SOLUTION ORAL DAILY
Status: DISCONTINUED | OUTPATIENT
Start: 2019-12-21 | End: 2019-12-21 | Stop reason: HOSPADM

## 2019-12-21 RX ORDER — METRONIDAZOLE 500 MG/1
500 TABLET ORAL 3 TIMES DAILY
Qty: 30 TABLET | Refills: 0 | Status: SHIPPED | OUTPATIENT
Start: 2019-12-21 | End: 2019-12-31

## 2019-12-21 RX ADMIN — AMLODIPINE BESYLATE 10 MG: 10 TABLET ORAL at 09:19

## 2019-12-21 RX ADMIN — PIPERACILLIN AND TAZOBACTAM 3.38 G: 3; .375 INJECTION, POWDER, FOR SOLUTION INTRAVENOUS at 09:21

## 2019-12-21 RX ADMIN — PRIMIDONE 100 MG: 50 TABLET ORAL at 13:01

## 2019-12-21 RX ADMIN — ASPIRIN 162 MG: 81 TABLET, COATED ORAL at 10:58

## 2019-12-21 RX ADMIN — PIPERACILLIN AND TAZOBACTAM 3.38 G: 3; .375 INJECTION, POWDER, FOR SOLUTION INTRAVENOUS at 00:14

## 2019-12-21 RX ADMIN — TAMSULOSIN HYDROCHLORIDE 0.4 MG: 0.4 CAPSULE ORAL at 09:19

## 2019-12-21 RX ADMIN — FERROUS SULFATE TAB 325 MG (65 MG ELEMENTAL FE) 325 MG: 325 (65 FE) TAB at 09:19

## 2019-12-21 RX ADMIN — ALLOPURINOL 300 MG: 300 TABLET ORAL at 09:20

## 2019-12-21 RX ADMIN — HYDRALAZINE HYDROCHLORIDE 50 MG: 50 TABLET, FILM COATED ORAL at 14:20

## 2019-12-21 RX ADMIN — HYDRALAZINE HYDROCHLORIDE 50 MG: 50 TABLET, FILM COATED ORAL at 09:19

## 2019-12-21 RX ADMIN — CALCITRIOL 0.25 MCG: 0.25 CAPSULE ORAL at 09:20

## 2019-12-21 RX ADMIN — PANTOPRAZOLE SODIUM 40 MG: 40 TABLET, DELAYED RELEASE ORAL at 05:58

## 2019-12-21 RX ADMIN — CHOLECALCIFEROL TAB 50 MCG (2000 UNIT) 2000 UNITS: 50 TAB at 09:20

## 2019-12-21 RX ADMIN — SODIUM BICARBONATE 650 MG: 650 TABLET ORAL at 09:19

## 2019-12-21 RX ADMIN — SODIUM CHLORIDE: 9 INJECTION, SOLUTION INTRAVENOUS at 04:16

## 2019-12-21 RX ADMIN — ACETAMINOPHEN 650 MG: 325 TABLET ORAL at 04:19

## 2019-12-21 RX ADMIN — METOPROLOL SUCCINATE 50 MG: 50 TABLET, EXTENDED RELEASE ORAL at 09:20

## 2019-12-21 ASSESSMENT — PAIN SCALES - GENERAL: PAINLEVEL_OUTOF10: 5

## 2020-01-31 ENCOUNTER — HOSPITAL ENCOUNTER (OUTPATIENT)
Age: 80
Discharge: HOME | End: 2020-02-02

## 2020-01-31 PROCEDURE — 88305 TISSUE EXAM BY PATHOLOGIST: CPT

## 2020-02-05 ENCOUNTER — OFFICE VISIT (OUTPATIENT)
Dept: VASCULAR SURGERY | Age: 80
End: 2020-02-05
Payer: MEDICARE

## 2020-02-05 ENCOUNTER — HOSPITAL ENCOUNTER (OUTPATIENT)
Dept: CARDIOLOGY | Age: 80
Discharge: HOME | End: 2020-02-05
Payer: MEDICARE

## 2020-02-05 VITALS
HEIGHT: 69 IN | DIASTOLIC BLOOD PRESSURE: 78 MMHG | WEIGHT: 230 LBS | BODY MASS INDEX: 34.07 KG/M2 | RESPIRATION RATE: 16 BRPM | SYSTOLIC BLOOD PRESSURE: 150 MMHG

## 2020-02-05 PROBLEM — I65.23 BILATERAL CAROTID ARTERY STENOSIS: Status: ACTIVE | Noted: 2020-02-05

## 2020-02-05 PROCEDURE — 93880 EXTRACRANIAL BILAT STUDY: CPT

## 2020-02-05 PROCEDURE — G8417 CALC BMI ABV UP PARAM F/U: HCPCS | Performed by: SURGERY

## 2020-02-05 PROCEDURE — 4040F PNEUMOC VAC/ADMIN/RCVD: CPT | Performed by: SURGERY

## 2020-02-05 PROCEDURE — 1123F ACP DISCUSS/DSCN MKR DOCD: CPT | Performed by: SURGERY

## 2020-02-05 PROCEDURE — 1036F TOBACCO NON-USER: CPT | Performed by: SURGERY

## 2020-02-05 PROCEDURE — 99213 OFFICE O/P EST LOW 20 MIN: CPT | Performed by: PHYSICIAN ASSISTANT

## 2020-02-05 PROCEDURE — G8484 FLU IMMUNIZE NO ADMIN: HCPCS | Performed by: SURGERY

## 2020-02-05 PROCEDURE — G8427 DOCREV CUR MEDS BY ELIG CLIN: HCPCS | Performed by: SURGERY

## 2020-02-05 NOTE — PROGRESS NOTES
Vascular Surgery Outpatient Progress Note      Chief Complaint   Patient presents with    Follow-up     carotid artery       HISTORY OF PRESENT ILLNESS:                The patient is a 78 y.o. male who returns for follow-up evaluation of carotid artery disease. He denies any symptoms of stroke, ministroke, or amaurosis fugax. He notes he had a L CEA over 10 years ago at [facility] for asymptomatic carotid disease. Denies any major changes in health since last year but notes he was hospitalized once for diverticulitis but this has resolved and did not require surgical intervention. Past Medical History:        Diagnosis Date    Atrial fibrillation (HCC)     CKD (chronic kidney disease) stage 3, GFR 30-59 ml/min ([facility] [address] 12/17/2014    H/O syncope     Hemorrhoids     Hyperlipidemia     Hypertension      Past Surgical History:        Procedure Laterality Date    CAROTID ENDARTERECTOMY      CARPAL TUNNEL RELEASE Bilateral     CHOLECYSTECTOMY      FINGER TRIGGER RELEASE Bilateral     KNEE SURGERY      SHOULDER SURGERY Bilateral     UPPER GASTROINTESTINAL ENDOSCOPY N/A 2/7/2019    EGD BIOPSY performed by General Tamia MD at [address] [place] [address]     Current Medications:   Prior to Admission medications    Medication Sig Start Date End Date Taking?  Authorizing Provider   potassium chloride (KLOR-CON) 20 MEQ packet Take 20 mEq by mouth daily   Yes Historical Provider, MD   SODIUM BICARBONATE PO Take 650 mg by mouth 2 times daily   Yes Historical Provider, MD   furosemide (LASIX) 40 MG tablet Take 40 mg by mouth daily   Yes Historical Provider, MD   metoprolol succinate (TOPROL XL) 50 MG extended release tablet Take 1 tablet by mouth daily 11/5/19  Yes Jessica Baeza DO   tamsulosin [facility]) 0.4 MG capsule Take 1 capsule by mouth daily 11/5/19  Yes Jessica Baeza DO   amLODIPine (NORVASC) 10 MG tablet Take 1 tablet by mouth daily 11/5/19  Yes Jessica Baeza DO   vitamin D 25 MCG (1000 UT) CAPS Take 2,000 drinks    Drug use: Never    Sexual activity: Never   Lifestyle    Physical activity:     Days per week: Not on file     Minutes per session: Not on file    Stress: Not on file   Relationships    Social connections:     Talks on phone: Not on file     Gets together: Not on file     Attends Taoist service: Not on file     Active member of club or organization: Not on file     Attends meetings of clubs or organizations: Not on file     Relationship status: Not on file    Intimate partner violence:     Fear of current or ex partner: Not on file     Emotionally abused: Not on file     Physically abused: Not on file     Forced sexual activity: Not on file   Other Topics Concern    Not on file   Social History Narrative    Not on file        Family History   Problem Relation Age of Onset    Cancer Mother        PHYSICAL EXAM:  Vitals:    02/05/20 0924   BP: (!) 150/78   Resp: 16     General Appearance: alert and oriented to person, place and time, in no acute distress, well developed and well- nourished  Neurologic: no cranial nerve deficit, speech normal  Head: normocephalic and atraumatic  Neck: Carotid bruit present bilaterally  Eyes: extraocular eye movements intact, conjunctivae normal  ENT: external ear and ear canal normal bilaterally, nose without deformity  Pulmonary/Chest: normal air movement, no respiratory distress  Cardiovascular: normal rate, regular rhythm  Abdomen: non-distended, no masses  Musculoskeletal: no joint deformity or tenderness  Extremities: mild leg edema bilaterally  Skin: warm and dry, no rash or erythema    PULSE EXAM      Right      Left   Brachial     Radial 3 3   Femoral     Popliteal     Dorsalis Pedis     Posterior Tibial     (3=normal, 2=diminished, 1=barely palpable, 4=widened)    RADIOLOGY: Carotid US:  Right 186 / 51 (1.9), 50-79%. Left 314 / 51 (3.8), 70-99%.     Problem List Items Addressed This Visit        Vascular    Bilateral carotid artery stenosis - Primary

## 2020-02-05 NOTE — PROGRESS NOTES
[facility]    This is a pre read worksheet - prior to official physician interpretation    Chandrika Sparkle  1940  Date of study: 2/5/20    Indication for study:  Carotid artery stenosis  Study : Bilateral Carotid Artery Duplex Examination    Duplex examination of the RIGHT carotid artery system identifies atherosclerotic plaque. The peak systolic velocity in internal carotid artery was 186 centimeters / second. The maximum end diastolic velocity was 51 centimeters / second. The ICA/CCA ratio is 1.9. The right vertebral artery has antegrade flow. Duplex examination of the LEFT carotid artery system identifies atherosclerotic plaque. The peak systolic velocity in internal carotid artery was 314 centimeters / second. The maximum end diastolic velocity was 51 centimeters / second. The ICA/CCA ratio is 3.8. The left vertebral artery flow is not visualized.     RIGHT  psv  LEFT  psv        LAST STUDY  11/14/2018  Rt 50-79  Lt 70-99

## 2020-03-05 ENCOUNTER — HOSPITAL ENCOUNTER (OUTPATIENT)
Age: 80
Discharge: HOME | End: 2020-03-07

## 2020-03-05 PROCEDURE — 88305 TISSUE EXAM BY PATHOLOGIST: CPT

## 2020-03-07 ENCOUNTER — HOSPITAL ENCOUNTER (EMERGENCY)
Age: 80
Discharge: HOME | DRG: 920 | End: 2020-03-07
Attending: FAMILY MEDICINE
Payer: MEDICARE

## 2020-03-07 VITALS
SYSTOLIC BLOOD PRESSURE: 160 MMHG | RESPIRATION RATE: 18 BRPM | DIASTOLIC BLOOD PRESSURE: 66 MMHG | HEIGHT: 70 IN | TEMPERATURE: 98.2 F | WEIGHT: 230 LBS | HEART RATE: 80 BPM | OXYGEN SATURATION: 98 % | BODY MASS INDEX: 32.93 KG/M2

## 2020-03-07 LAB
BILIRUBIN URINE: NEGATIVE
BLOOD, URINE: NEGATIVE
CLARITY: CLEAR
COLOR: YELLOW
GLUCOSE URINE: NEGATIVE MG/DL
KETONES, URINE: NEGATIVE MG/DL
LEUKOCYTE ESTERASE, URINE: NEGATIVE
NITRITE, URINE: NEGATIVE
PH UA: 6.5 (ref 5–9)
PROTEIN UA: NEGATIVE MG/DL
SPECIFIC GRAVITY UA: 1.01 (ref 1–1.03)
UROBILINOGEN, URINE: 0.2 E.U./DL

## 2020-03-07 PROCEDURE — 81003 URINALYSIS AUTO W/O SCOPE: CPT

## 2020-03-07 PROCEDURE — 87088 URINE BACTERIA CULTURE: CPT

## 2020-03-07 PROCEDURE — 51702 INSERT TEMP BLADDER CATH: CPT

## 2020-03-07 PROCEDURE — 99283 EMERGENCY DEPT VISIT LOW MDM: CPT

## 2020-03-07 ASSESSMENT — PAIN DESCRIPTION - LOCATION: LOCATION: ABDOMEN

## 2020-03-07 ASSESSMENT — PAIN DESCRIPTION - PAIN TYPE: TYPE: ACUTE PAIN

## 2020-03-07 ASSESSMENT — PAIN SCALES - GENERAL: PAINLEVEL_OUTOF10: 4

## 2020-03-07 ASSESSMENT — PAIN DESCRIPTION - DESCRIPTORS: DESCRIPTORS: DISCOMFORT

## 2020-03-07 NOTE — ED PROVIDER NOTES
Department of Emergency Medicine   ED  Provider Note  Admit Date/RoomTime: 3/7/2020  9:31 AM  ED Room: 07/07  Chief Complaint   Constipation (pt had surgery on Thursday for excision of mass- transanal excision - patient unable to have a BM since thursday) and Urinary Retention (pt has been unable to urinate since thursday- since thursday has only been able to dribble)    History of Present Illness   Source of history provided by:  patient. History/Exam Limitations: none. Naa Dumont is a 78 y.o. old male who has a past medical history of:   Past Medical History:   Diagnosis Date    Atrial fibrillation ([facility] [address]     CKD (chronic kidney disease) stage 3, GFR 30-59 ml/min ([facility] [address] 12/17/2014    H/O syncope     Hemorrhoids     Hyperlipidemia     Hypertension     presents to the emergency department by private vehicle and ambulatory, for urinary retention, which occured 2 day(s) prior to arrival.  Since onset the symptoms have been persistent and moderate in severity. Symptoms are associated with constipation and nothing pertinent. He has a history of retention . ROS    Pertinent positives and negatives are stated within HPI, all other systems reviewed and are negative. Past Surgical History:   Procedure Laterality Date    CAROTID ENDARTERECTOMY      CARPAL TUNNEL RELEASE Bilateral     CHOLECYSTECTOMY      FINGER TRIGGER RELEASE Bilateral     KNEE SURGERY      SHOULDER SURGERY Bilateral     UPPER GASTROINTESTINAL ENDOSCOPY N/A 2/7/2019    EGD BIOPSY performed by Raya Duggan MD at [address] [place] [address] History:  reports that he quit smoking about 37 years ago. His smoking use included cigarettes. He started smoking about 70 years ago. He smoked 0.75 packs per day. He has never used smokeless tobacco. He reports previous alcohol use. He reports that he does not use drugs. Family History: family history includes Cancer in his mother. Allergies: Patient has no known allergies.     Physical with the patient and family member patient and son and discussed todays results, in addition to providing specific details for the plan of care and counseling regarding the diagnosis and prognosis. Questions are answered at this time and they are agreeable with the plan. Assessment      1. Urinary retention    2. Constipation, unspecified constipation type      Plan   Disposition: Discharge to home  Patient condition is good    New Medications     New Prescriptions    No medications on file     Electronically signed by Krishna Kenny MD   DD: 3/7/20  **This report was transcribed using voice recognition software. Every effort was made to ensure accuracy; however, inadvertent computerized transcription errors may be present.   END OF ED PROVIDER NOTE            Krishna Kenny MD  03/07/20 9981

## 2020-03-07 NOTE — ED NOTES
Patient given discharge paperwork at this time. Patient also given scripts. Patient has no further questions at this time. Nurse provided education to patient. Patient is alert and oriented and VS are stable at this time. Patient discharged with zamora catheter in place. Patient given f/u information for urology and told to call Monday. Nurse instructed patient to call [facility] with any questions regarding catheter. Education provided to patient from nurse on how to empty zamora catheter in the bathroom. Nurse assisted patient to get dressed with zamora in placed. Education also provided to family member at this time. Patient refuses leg bag at this time, wants to be discharged with the standard zamora bag.       Kay Alba RN  03/07/20 6192

## 2020-03-08 ENCOUNTER — HOSPITAL ENCOUNTER (INPATIENT)
Age: 80
LOS: 1 days | Discharge: HOME | DRG: 920 | End: 2020-03-10
Attending: EMERGENCY MEDICINE | Admitting: INTERNAL MEDICINE
Payer: MEDICARE

## 2020-03-08 LAB
ALBUMIN SERPL-MCNC: 3.9 G/DL (ref 3.5–5.2)
ALP BLD-CCNC: 91 U/L (ref 40–129)
ALT SERPL-CCNC: 12 U/L (ref 0–40)
ANION GAP SERPL CALCULATED.3IONS-SCNC: 10 MMOL/L (ref 7–16)
ANION GAP SERPL CALCULATED.3IONS-SCNC: 9 MMOL/L (ref 7–16)
APTT: 33.5 SEC (ref 24.5–35.1)
AST SERPL-CCNC: 14 U/L (ref 0–39)
BACTERIA: ABNORMAL /HPF
BASOPHILS ABSOLUTE: 0.05 E9/L (ref 0–0.2)
BASOPHILS RELATIVE PERCENT: 0.5 % (ref 0–2)
BILIRUB SERPL-MCNC: 0.3 MG/DL (ref 0–1.2)
BILIRUBIN URINE: NEGATIVE
BLOOD, URINE: ABNORMAL
BUN BLDV-MCNC: 48 MG/DL (ref 8–23)
BUN BLDV-MCNC: 49 MG/DL (ref 8–23)
CALCIUM SERPL-MCNC: 8.7 MG/DL (ref 8.6–10.2)
CALCIUM SERPL-MCNC: 9.2 MG/DL (ref 8.6–10.2)
CHLORIDE BLD-SCNC: 104 MMOL/L (ref 98–107)
CHLORIDE BLD-SCNC: 106 MMOL/L (ref 98–107)
CLARITY: CLEAR
CO2: 24 MMOL/L (ref 22–29)
CO2: 25 MMOL/L (ref 22–29)
COLOR: YELLOW
CREAT SERPL-MCNC: 1.7 MG/DL (ref 0.7–1.2)
CREAT SERPL-MCNC: 1.9 MG/DL (ref 0.7–1.2)
EOSINOPHILS ABSOLUTE: 0.33 E9/L (ref 0.05–0.5)
EOSINOPHILS RELATIVE PERCENT: 3 % (ref 0–6)
GFR AFRICAN AMERICAN: 41
GFR AFRICAN AMERICAN: 47
GFR NON-AFRICAN AMERICAN: 34 ML/MIN/1.73
GFR NON-AFRICAN AMERICAN: 39 ML/MIN/1.73
GLUCOSE BLD-MCNC: 102 MG/DL (ref 74–99)
GLUCOSE BLD-MCNC: 105 MG/DL (ref 74–99)
GLUCOSE URINE: NEGATIVE MG/DL
HCT VFR BLD CALC: 36.5 % (ref 37–54)
HEMOGLOBIN: 11.8 G/DL (ref 12.5–16.5)
IMMATURE GRANULOCYTES #: 0.04 E9/L
IMMATURE GRANULOCYTES %: 0.4 % (ref 0–5)
INR BLD: 1.5
KETONES, URINE: NEGATIVE MG/DL
LACTIC ACID: 0.9 MMOL/L (ref 0.5–2.2)
LEUKOCYTE ESTERASE, URINE: NEGATIVE
LYMPHOCYTES ABSOLUTE: 1.9 E9/L (ref 1.5–4)
LYMPHOCYTES RELATIVE PERCENT: 17.5 % (ref 20–42)
MCH RBC QN AUTO: 32.1 PG (ref 26–35)
MCHC RBC AUTO-ENTMCNC: 32.3 % (ref 32–34.5)
MCV RBC AUTO: 99.2 FL (ref 80–99.9)
MONOCYTES ABSOLUTE: 1.1 E9/L (ref 0.1–0.95)
MONOCYTES RELATIVE PERCENT: 10.1 % (ref 2–12)
NEUTROPHILS ABSOLUTE: 7.46 E9/L (ref 1.8–7.3)
NEUTROPHILS RELATIVE PERCENT: 68.5 % (ref 43–80)
NITRITE, URINE: NEGATIVE
PDW BLD-RTO: 16.2 FL (ref 11.5–15)
PH UA: 8 (ref 5–9)
PLATELET # BLD: 169 E9/L (ref 130–450)
PMV BLD AUTO: 11.8 FL (ref 7–12)
POTASSIUM SERPL-SCNC: 5.8 MMOL/L (ref 3.5–5)
POTASSIUM SERPL-SCNC: 6.1 MMOL/L (ref 3.5–5)
PROTEIN UA: 30 MG/DL
PROTHROMBIN TIME: 16.2 SEC (ref 9.3–12.4)
RBC # BLD: 3.68 E12/L (ref 3.8–5.8)
RBC UA: >20 /HPF (ref 0–2)
SODIUM BLD-SCNC: 139 MMOL/L (ref 132–146)
SODIUM BLD-SCNC: 139 MMOL/L (ref 132–146)
SPECIFIC GRAVITY UA: 1.01 (ref 1–1.03)
TOTAL PROTEIN: 6.4 G/DL (ref 6.4–8.3)
UROBILINOGEN, URINE: 0.2 E.U./DL
WBC # BLD: 10.9 E9/L (ref 4.5–11.5)
WBC UA: ABNORMAL /HPF (ref 0–5)

## 2020-03-08 PROCEDURE — 93005 ELECTROCARDIOGRAM TRACING: CPT | Performed by: EMERGENCY MEDICINE

## 2020-03-08 PROCEDURE — 6360000002 HC RX W HCPCS: Performed by: EMERGENCY MEDICINE

## 2020-03-08 PROCEDURE — 96374 THER/PROPH/DIAG INJ IV PUSH: CPT

## 2020-03-08 PROCEDURE — 80048 BASIC METABOLIC PNL TOTAL CA: CPT

## 2020-03-08 PROCEDURE — 85025 COMPLETE CBC W/AUTO DIFF WBC: CPT

## 2020-03-08 PROCEDURE — 83605 ASSAY OF LACTIC ACID: CPT

## 2020-03-08 PROCEDURE — 6370000000 HC RX 637 (ALT 250 FOR IP): Performed by: EMERGENCY MEDICINE

## 2020-03-08 PROCEDURE — 96375 TX/PRO/DX INJ NEW DRUG ADDON: CPT

## 2020-03-08 PROCEDURE — 85610 PROTHROMBIN TIME: CPT

## 2020-03-08 PROCEDURE — 81001 URINALYSIS AUTO W/SCOPE: CPT

## 2020-03-08 PROCEDURE — 85730 THROMBOPLASTIN TIME PARTIAL: CPT

## 2020-03-08 PROCEDURE — 80053 COMPREHEN METABOLIC PANEL: CPT

## 2020-03-08 PROCEDURE — 2580000003 HC RX 258: Performed by: EMERGENCY MEDICINE

## 2020-03-08 PROCEDURE — 36415 COLL VENOUS BLD VENIPUNCTURE: CPT

## 2020-03-08 PROCEDURE — 99284 EMERGENCY DEPT VISIT MOD MDM: CPT

## 2020-03-08 RX ORDER — 0.9 % SODIUM CHLORIDE 0.9 %
1000 INTRAVENOUS SOLUTION INTRAVENOUS ONCE
Status: COMPLETED | OUTPATIENT
Start: 2020-03-08 | End: 2020-03-09

## 2020-03-08 RX ORDER — CALCIUM GLUCONATE 94 MG/ML
1 INJECTION, SOLUTION INTRAVENOUS ONCE
Status: COMPLETED | OUTPATIENT
Start: 2020-03-08 | End: 2020-03-08

## 2020-03-08 RX ORDER — SODIUM POLYSTYRENE SULFONATE 15 G/60ML
15 SUSPENSION ORAL; RECTAL ONCE
Status: COMPLETED | OUTPATIENT
Start: 2020-03-08 | End: 2020-03-09

## 2020-03-08 RX ORDER — 0.9 % SODIUM CHLORIDE 0.9 %
1000 INTRAVENOUS SOLUTION INTRAVENOUS ONCE
Status: COMPLETED | OUTPATIENT
Start: 2020-03-08 | End: 2020-03-08

## 2020-03-08 RX ORDER — DEXTROSE MONOHYDRATE 25 G/50ML
25 INJECTION, SOLUTION INTRAVENOUS ONCE
Status: COMPLETED | OUTPATIENT
Start: 2020-03-08 | End: 2020-03-09

## 2020-03-08 RX ORDER — NICOTINE POLACRILEX 4 MG
15 LOZENGE BUCCAL PRN
Status: DISCONTINUED | OUTPATIENT
Start: 2020-03-08 | End: 2020-03-09

## 2020-03-08 RX ORDER — DEXTROSE MONOHYDRATE 50 MG/ML
100 INJECTION, SOLUTION INTRAVENOUS PRN
Status: DISCONTINUED | OUTPATIENT
Start: 2020-03-08 | End: 2020-03-09

## 2020-03-08 RX ORDER — DEXTROSE MONOHYDRATE 25 G/50ML
12.5 INJECTION, SOLUTION INTRAVENOUS PRN
Status: DISCONTINUED | OUTPATIENT
Start: 2020-03-08 | End: 2020-03-09

## 2020-03-08 RX ADMIN — CALCIUM GLUCONATE 1 G: 98 INJECTION, SOLUTION INTRAVENOUS at 23:57

## 2020-03-08 RX ADMIN — SODIUM CHLORIDE 1000 ML: 9 INJECTION, SOLUTION INTRAVENOUS at 21:57

## 2020-03-08 RX ADMIN — INSULIN HUMAN 10 UNITS: 100 INJECTION, SOLUTION PARENTERAL at 23:58

## 2020-03-08 ASSESSMENT — PAIN DESCRIPTION - FREQUENCY: FREQUENCY: INTERMITTENT

## 2020-03-08 ASSESSMENT — PAIN DESCRIPTION - LOCATION: LOCATION: PENIS

## 2020-03-08 ASSESSMENT — PAIN SCALES - GENERAL: PAINLEVEL_OUTOF10: 8

## 2020-03-08 NOTE — LETTER
Whats most important for you to know is that your Medicare rights and benefits wont change because your health care provider is participating in [address]. Medicare will keep covering all of your medically necessary services. Even though Medicare will pay your doctor in a different way under [employer], how much you have to pay wont change. Health care providers and suppliers who are enrolled in Medicare will submit their Medicare claims like they always have. Youll have all the same Medicare rights and protections, including the right to choose which hospital, doctor, or other health care provider you see. If you dont want to get care from a health care provider whos participating in [address], then youll have to choose a different health care provider whos not participating in the Model. How can I give feedback about my health care? Medicare might ask you to take a voluntary survey about the services and care you received from [address] [place] [address] during your hospital stay or outpatient procedure and for a specific period of time afterwards. You can decide whether you want to take the voluntary survey, but if you do, itll help Medicare make [employer] and the care of other Medicare patients better. If you have concerns or complaints about your care, you can:   · Talk to your doctor or health care provider. · Contact your Beneficiary and Family Centered Care Quality Improvement   Organization [facility]BORAH HOWARD [facility]). You can get your [employer]IOs phone number  at  Medicare.gov/contacts or by calling 1-800-MEDICARE. TTY users can call  1-948.847.8706. Where can I learn more about [employer]? Learn more about [employer] at https://innovation.cms.gov/initiatives/bpci-advanced/:  · A list of all the hospitals and physician group practices in the country participating in [address]. · All of the inpatient and outpatient Clinical Episodes that are currently included under [employer]. A Clinical Episode is a grouping of medical conditions or diagnoses that are included in the [address] [facility].

## 2020-03-09 PROBLEM — K62.5 RECTAL BLEEDING: Status: ACTIVE | Noted: 2020-03-09

## 2020-03-09 PROBLEM — E87.5 HYPERKALEMIA: Status: ACTIVE | Noted: 2020-03-09

## 2020-03-09 PROBLEM — R33.8 ACUTE URINARY RETENTION: Status: ACTIVE | Noted: 2020-03-09

## 2020-03-09 LAB
ANION GAP SERPL CALCULATED.3IONS-SCNC: 9 MMOL/L (ref 7–16)
BUN BLDV-MCNC: 38 MG/DL (ref 8–23)
CALCIUM SERPL-MCNC: 8.6 MG/DL (ref 8.6–10.2)
CHLORIDE BLD-SCNC: 110 MMOL/L (ref 98–107)
CHP ED QC CHECK: YES
CO2: 23 MMOL/L (ref 22–29)
CREAT SERPL-MCNC: 1.5 MG/DL (ref 0.7–1.2)
CREATININE URINE: 59 MG/DL (ref 40–278)
EOSINOPHIL, URINE: 8 % (ref 0–1)
GFR AFRICAN AMERICAN: 54
GFR NON-AFRICAN AMERICAN: 45 ML/MIN/1.73
GLUCOSE BLD-MCNC: 117 MG/DL
GLUCOSE BLD-MCNC: 123 MG/DL (ref 74–99)
HCT VFR BLD CALC: 33.1 % (ref 37–54)
HEMOGLOBIN: 10 G/DL (ref 12.5–16.5)
METER GLUCOSE: 117 MG/DL (ref 74–99)
METER GLUCOSE: 155 MG/DL (ref 74–99)
METER GLUCOSE: 99 MG/DL (ref 74–99)
POTASSIUM SERPL-SCNC: 5.4 MMOL/L (ref 3.5–5)
POTASSIUM, UR: 39.1 MMOL/L
SODIUM BLD-SCNC: 142 MMOL/L (ref 132–146)
SODIUM URINE: 152 MMOL/L
UREA NITROGEN, UR: 606 MG/DL (ref 800–1666)
URINE CULTURE, ROUTINE: NORMAL

## 2020-03-09 PROCEDURE — 84133 ASSAY OF URINE POTASSIUM: CPT

## 2020-03-09 PROCEDURE — 97530 THERAPEUTIC ACTIVITIES: CPT

## 2020-03-09 PROCEDURE — 6370000000 HC RX 637 (ALT 250 FOR IP): Performed by: EMERGENCY MEDICINE

## 2020-03-09 PROCEDURE — 2580000003 HC RX 258: Performed by: EMERGENCY MEDICINE

## 2020-03-09 PROCEDURE — 1200000000 HC SEMI PRIVATE

## 2020-03-09 PROCEDURE — 82570 ASSAY OF URINE CREATININE: CPT

## 2020-03-09 PROCEDURE — 6360000002 HC RX W HCPCS: Performed by: INTERNAL MEDICINE

## 2020-03-09 PROCEDURE — 80048 BASIC METABOLIC PNL TOTAL CA: CPT

## 2020-03-09 PROCEDURE — 2580000003 HC RX 258: Performed by: NURSE PRACTITIONER

## 2020-03-09 PROCEDURE — 85018 HEMOGLOBIN: CPT

## 2020-03-09 PROCEDURE — 84540 ASSAY OF URINE/UREA-N: CPT

## 2020-03-09 PROCEDURE — 36415 COLL VENOUS BLD VENIPUNCTURE: CPT

## 2020-03-09 PROCEDURE — 97162 PT EVAL MOD COMPLEX 30 MIN: CPT

## 2020-03-09 PROCEDURE — 82962 GLUCOSE BLOOD TEST: CPT

## 2020-03-09 PROCEDURE — 97165 OT EVAL LOW COMPLEX 30 MIN: CPT

## 2020-03-09 PROCEDURE — 2500000003 HC RX 250 WO HCPCS: Performed by: EMERGENCY MEDICINE

## 2020-03-09 PROCEDURE — 87205 SMEAR GRAM STAIN: CPT

## 2020-03-09 PROCEDURE — 85014 HEMATOCRIT: CPT

## 2020-03-09 PROCEDURE — 84300 ASSAY OF URINE SODIUM: CPT

## 2020-03-09 PROCEDURE — 6370000000 HC RX 637 (ALT 250 FOR IP): Performed by: NURSE PRACTITIONER

## 2020-03-09 RX ORDER — HYDRALAZINE HYDROCHLORIDE 50 MG/1
50 TABLET, FILM COATED ORAL 3 TIMES DAILY
Status: DISCONTINUED | OUTPATIENT
Start: 2020-03-09 | End: 2020-03-10

## 2020-03-09 RX ORDER — ATORVASTATIN CALCIUM 40 MG/1
40 TABLET, FILM COATED ORAL NIGHTLY
Status: DISCONTINUED | OUTPATIENT
Start: 2020-03-09 | End: 2020-03-10 | Stop reason: HOSPADM

## 2020-03-09 RX ORDER — METOPROLOL SUCCINATE 50 MG/1
50 TABLET, EXTENDED RELEASE ORAL DAILY
Status: DISCONTINUED | OUTPATIENT
Start: 2020-03-09 | End: 2020-03-10 | Stop reason: HOSPADM

## 2020-03-09 RX ORDER — SODIUM CHLORIDE 9 MG/ML
INJECTION, SOLUTION INTRAVENOUS CONTINUOUS
Status: DISCONTINUED | OUTPATIENT
Start: 2020-03-09 | End: 2020-03-09

## 2020-03-09 RX ORDER — SODIUM CHLORIDE 0.9 % (FLUSH) 0.9 %
10 SYRINGE (ML) INJECTION PRN
Status: DISCONTINUED | OUTPATIENT
Start: 2020-03-09 | End: 2020-03-10 | Stop reason: HOSPADM

## 2020-03-09 RX ORDER — PRIMIDONE 50 MG/1
100 TABLET ORAL 2 TIMES DAILY
Status: DISCONTINUED | OUTPATIENT
Start: 2020-03-09 | End: 2020-03-10 | Stop reason: HOSPADM

## 2020-03-09 RX ORDER — ACETAMINOPHEN 325 MG/1
650 TABLET ORAL EVERY 6 HOURS PRN
Status: DISCONTINUED | OUTPATIENT
Start: 2020-03-09 | End: 2020-03-10 | Stop reason: HOSPADM

## 2020-03-09 RX ORDER — ASPIRIN 81 MG/1
81 TABLET ORAL EVERY MORNING
Status: DISCONTINUED | OUTPATIENT
Start: 2020-03-09 | End: 2020-03-09

## 2020-03-09 RX ORDER — PANTOPRAZOLE SODIUM 40 MG/1
40 TABLET, DELAYED RELEASE ORAL
Status: DISCONTINUED | OUTPATIENT
Start: 2020-03-09 | End: 2020-03-10 | Stop reason: HOSPADM

## 2020-03-09 RX ORDER — TAMSULOSIN HYDROCHLORIDE 0.4 MG/1
0.4 CAPSULE ORAL DAILY
Status: DISCONTINUED | OUTPATIENT
Start: 2020-03-09 | End: 2020-03-10 | Stop reason: HOSPADM

## 2020-03-09 RX ORDER — FERROUS SULFATE 325(65) MG
325 TABLET ORAL 2 TIMES DAILY
Status: DISCONTINUED | OUTPATIENT
Start: 2020-03-09 | End: 2020-03-10 | Stop reason: HOSPADM

## 2020-03-09 RX ORDER — SODIUM BICARBONATE 650 MG/1
650 TABLET ORAL 2 TIMES DAILY
Status: DISCONTINUED | OUTPATIENT
Start: 2020-03-09 | End: 2020-03-10 | Stop reason: HOSPADM

## 2020-03-09 RX ORDER — CALCITRIOL 0.25 UG/1
0.25 CAPSULE, LIQUID FILLED ORAL DAILY
Status: DISCONTINUED | OUTPATIENT
Start: 2020-03-09 | End: 2020-03-10 | Stop reason: HOSPADM

## 2020-03-09 RX ORDER — ONDANSETRON 2 MG/ML
4 INJECTION INTRAMUSCULAR; INTRAVENOUS EVERY 6 HOURS PRN
Status: DISCONTINUED | OUTPATIENT
Start: 2020-03-09 | End: 2020-03-10 | Stop reason: HOSPADM

## 2020-03-09 RX ORDER — SODIUM CHLORIDE 0.9 % (FLUSH) 0.9 %
10 SYRINGE (ML) INJECTION EVERY 12 HOURS SCHEDULED
Status: DISCONTINUED | OUTPATIENT
Start: 2020-03-09 | End: 2020-03-10 | Stop reason: HOSPADM

## 2020-03-09 RX ORDER — PROMETHAZINE HYDROCHLORIDE 25 MG/1
12.5 TABLET ORAL EVERY 6 HOURS PRN
Status: DISCONTINUED | OUTPATIENT
Start: 2020-03-09 | End: 2020-03-10 | Stop reason: HOSPADM

## 2020-03-09 RX ORDER — HYDRALAZINE HYDROCHLORIDE 20 MG/ML
5 INJECTION INTRAMUSCULAR; INTRAVENOUS EVERY 4 HOURS PRN
Status: DISCONTINUED | OUTPATIENT
Start: 2020-03-09 | End: 2020-03-09

## 2020-03-09 RX ORDER — ACETAMINOPHEN 650 MG/1
650 SUPPOSITORY RECTAL EVERY 6 HOURS PRN
Status: DISCONTINUED | OUTPATIENT
Start: 2020-03-09 | End: 2020-03-10 | Stop reason: HOSPADM

## 2020-03-09 RX ORDER — AMLODIPINE BESYLATE 10 MG/1
10 TABLET ORAL DAILY
Status: DISCONTINUED | OUTPATIENT
Start: 2020-03-09 | End: 2020-03-10 | Stop reason: HOSPADM

## 2020-03-09 RX ADMIN — SODIUM BICARBONATE 650 MG: 650 TABLET ORAL at 23:05

## 2020-03-09 RX ADMIN — SODIUM CHLORIDE 1000 ML: 9 INJECTION, SOLUTION INTRAVENOUS at 00:25

## 2020-03-09 RX ADMIN — HYDRALAZINE HYDROCHLORIDE 5 MG: 20 INJECTION INTRAMUSCULAR; INTRAVENOUS at 05:47

## 2020-03-09 RX ADMIN — FERROUS SULFATE TAB 325 MG (65 MG ELEMENTAL FE) 325 MG: 325 (65 FE) TAB at 19:10

## 2020-03-09 RX ADMIN — CALCITRIOL 0.25 MCG: 0.25 CAPSULE ORAL at 08:39

## 2020-03-09 RX ADMIN — SODIUM CHLORIDE, PRESERVATIVE FREE 10 ML: 5 INJECTION INTRAVENOUS at 08:39

## 2020-03-09 RX ADMIN — ASPIRIN 81 MG: 81 TABLET, COATED ORAL at 08:39

## 2020-03-09 RX ADMIN — SODIUM CHLORIDE: 9 INJECTION, SOLUTION INTRAVENOUS at 03:48

## 2020-03-09 RX ADMIN — SODIUM BICARBONATE 50 MEQ: 84 INJECTION, SOLUTION INTRAVENOUS at 00:03

## 2020-03-09 RX ADMIN — DEXTROSE 50 % IN WATER (D50W) INTRAVENOUS SYRINGE 25 G: at 00:20

## 2020-03-09 RX ADMIN — TAMSULOSIN HYDROCHLORIDE 0.4 MG: 0.4 CAPSULE ORAL at 08:39

## 2020-03-09 RX ADMIN — SODIUM CHLORIDE, PRESERVATIVE FREE 10 ML: 5 INJECTION INTRAVENOUS at 23:05

## 2020-03-09 RX ADMIN — PRIMIDONE 100 MG: 50 TABLET ORAL at 23:05

## 2020-03-09 RX ADMIN — HYDRALAZINE HYDROCHLORIDE 50 MG: 50 TABLET, FILM COATED ORAL at 23:05

## 2020-03-09 RX ADMIN — HYDRALAZINE HYDROCHLORIDE 50 MG: 50 TABLET, FILM COATED ORAL at 08:39

## 2020-03-09 RX ADMIN — HYDRALAZINE HYDROCHLORIDE 50 MG: 50 TABLET, FILM COATED ORAL at 14:33

## 2020-03-09 RX ADMIN — AMLODIPINE BESYLATE 10 MG: 10 TABLET ORAL at 08:39

## 2020-03-09 RX ADMIN — SODIUM POLYSTYRENE SULFONATE 15 G: 15 SUSPENSION ORAL; RECTAL at 00:25

## 2020-03-09 RX ADMIN — FERROUS SULFATE TAB 325 MG (65 MG ELEMENTAL FE) 325 MG: 325 (65 FE) TAB at 08:39

## 2020-03-09 RX ADMIN — ATORVASTATIN CALCIUM 40 MG: 40 TABLET, FILM COATED ORAL at 23:06

## 2020-03-09 RX ADMIN — SODIUM BICARBONATE 650 MG: 650 TABLET ORAL at 08:39

## 2020-03-09 RX ADMIN — METOPROLOL SUCCINATE 50 MG: 50 TABLET, EXTENDED RELEASE ORAL at 08:39

## 2020-03-09 RX ADMIN — PRIMIDONE 100 MG: 50 TABLET ORAL at 08:39

## 2020-03-09 ASSESSMENT — PAIN SCALES - GENERAL
PAINLEVEL_OUTOF10: 0

## 2020-03-09 NOTE — CONSULTS
at [facility] ENDOSCOPY       Family History   Problem Relation Age of Onset    Cancer Mother         reports that he quit smoking about 37 years ago. His smoking use included cigarettes. He started smoking about 70 years ago. He smoked 0.75 packs per day. He has never used smokeless tobacco. He reports previous alcohol use. He reports that he does not use drugs. Allergies:  Patient has no known allergies. Current Medications:    amLODIPine (NORVASC) tablet 10 mg, Daily  atorvastatin (LIPITOR) tablet 40 mg, Nightly  calcitRIOL (ROCALTROL) capsule 0.25 mcg, Daily  ferrous sulfate (IRON 325) tablet 325 mg, BID  hydrALAZINE (APRESOLINE) tablet 50 mg, TID  metoprolol succinate (TOPROL XL) extended release tablet 50 mg, Daily  pantoprazole (PROTONIX) tablet 40 mg, QAM AC  primidone (MYSOLINE) tablet 100 mg, BID  sodium bicarbonate tablet 650 mg, BID  tamsulosin (FLOMAX) capsule 0.4 mg, Daily  sodium chloride flush 0.9 % injection 10 mL, 2 times per day  sodium chloride flush 0.9 % injection 10 mL, PRN  acetaminophen (TYLENOL) tablet 650 mg, Q6H PRN    Or  acetaminophen (TYLENOL) suppository 650 mg, Q6H PRN  promethazine (PHENERGAN) tablet 12.5 mg, Q6H PRN    Or  ondansetron (ZOFRAN) injection 4 mg, Q6H PRN        Review of Systems:   Constitutional: no fevers , no chills , feels ok   Eyes: no eye pain , no itching , no drainage  Ears, nose, mouth, throat, and face: no ear ,nose pain , hearing is ok ,no nasal drainage   Respiratory: no sob ,no cough ,no wheezing . Cardiovascular: no chest pain , no palpitation ,no sob . Gastrointestinal: no nausea, vomiting , constipation , no abdominal pain . Genitourinary:no urinary retention , no burning , dysuria . No polyuria   Hematologic/lymphatic: no bleeding , no cougulation issues . Musculoskeletal:no joint pain , no swelling . Neurological: no headaches ,no weakness , no numbness . Endocrine: no thirst , no weight issues .      Physical exam:   Vital signs BP (!) LEUKOCYTESUR Negative 03/08/2020    UROBILINOGEN 0.2 03/08/2020    BILIRUBINUR Negative 03/08/2020    BLOODU LARGE 03/08/2020    GLUCOSEU Negative 03/08/2020     Microalbumen/Creatinine ratio:  No components found for: RUCREAT  Iron Saturation:  No components found for: PERCENTFE  TIBC:    Lab Results   Component Value Date    TIBC 209 11/02/2019     FERRITIN:    Lab Results   Component Value Date    FERRITIN 144 11/02/2019        Imaging:  No orders to display       Assessment    -Chronic kidney disease stage III baseline cr 1.5-1.7 nonproteinuric   -Hyperkalemia 2/2 # 1 , po kcl supplementation at home along with absorption of blood products from GI tracts .   -Rectal bleeding   -Atrial fibrilliation   -B/L carotid artery stenosis     Plan :    Cr stable at baseline   Clinically euvolemic . He is on calcitriol at home , recheck PTH to see where is it standing . Hold K supplementation .  F/u K numbers         Thank you Dr. Christiano Vázquez DO for allowing us to participate in care of Willis Willis           Electronically signed by Aly Noonan MD on 3/9/2020 at 4:23 PM

## 2020-03-09 NOTE — ED PROVIDER NOTES
home medications have been reviewed. Allergies: Patient has no known allergies. ---------------------------------------------------PHYSICAL EXAM--------------------------------------    Constitutional:  Well developed, well nourished, no acute distress, non-toxic appearance   Eyes:  PERRL, conjunctiva normal, EOMI  HENT:  Atraumatic, external ears normal, nose normal, oropharynx moist. Neck- normal range of motion, no nuchal rigidity   Respiratory:  No respiratory distress, normal breath sounds, no rales, no wheezing   Cardiovascular:  Normal rate, irregularly irregular rhythm, no murmurs, no gallops, no rubs. Radial and DP pulses 2+ bilaterally. GI:  Soft, nondistended, normal bowel sounds, nontender, no organomegaly, no mass, no rebound, no guarding rectal exam consistent with some perirectal redness but no evidence of active bleeding or trauma at this time. :  No costovertebral angle tenderness. Hanson in place and intact with some blood-tinged urine in bag, Hanson draining appropriately. Some blood at urethral meatus and irritation. Musculoskeletal:  No edema, no tenderness, no deformities. Back- no tenderness  Integument:  Well hydrated, no rash. Adequate perfusion. Lymphatic:  No inguinal lymphadenopathy noted   Neurologic:  Alert & oriented x 3, CN 2-12 normal, normalgait, no focal deficits noted. Psychiatric:  Speech and behavior appropriate       -------------------------------------------------- RESULTS -------------------------------------------------  I have personally reviewed all laboratory and imaging results for this patient. Results are listed below.      LABS:  Results for orders placed or performed during the hospital encounter of 03/08/20   CBC auto differential   Result Value Ref Range    WBC 10.9 4.5 - 11.5 E9/L    RBC 3.68 (L) 3.80 - 5.80 E12/L    Hemoglobin 11.8 (L) 12.5 - 16.5 g/dL    Hematocrit 36.5 (L) 37.0 - 54.0 %    MCV 99.2 80.0 - 99.9 fL    MCH 32.1 26.0 - 35.0 pg discussed case    Critical Care: none        Counseling: The emergency provider has spoken with the patient and family member and discussed todays results, in addition to providing specific details for the plan of care and counseling regarding the diagnosis and prognosis. Questions are answered at this time and they are agreeable with the plan.       --------------------------------- IMPRESSION AND DISPOSITION ---------------------------------    IMPRESSION  1. Hyperkalemia    2. Acute on chronic renal insufficiency    3. Rectal bleeding    4.  Urinary retention        DISPOSITION  Disposition: Admit to telemetry  Patient condition is stable                  Mtati Abreu, DO  03/09/20 [id number] Norma Lunsford,   03/09/20 0110

## 2020-03-09 NOTE — ED NOTES
Pt has urethral/penis irritation/blood due to zamora catheter. Pt also has bruising to right glut but denies any fall or injury.      Cosmo Resendiz, RN  03/09/20 2002

## 2020-03-09 NOTE — PROGRESS NOTES
Physical Therapy  Physical Therapy Initial Assessment     Name: Gopal Avalos  : 1940  MRN: [id number]    Referring Provider: Ele Govea MD    Date of Service: 3/9/2020    Evaluating PT: Luciano Freeman, PT, DPT, [id number]    Room #: 7456/0023-A  Diagnosis: Hyperkalemia  PMHx/PSHx: HTN, CKD, afib, carotid endarterectomy  Precautions: Fall risk, zamora    SUBJECTIVE:    Pt lives alone in a 2 story house with 6 stair(s) and 2 wide rail(s) to enter. Bed is on second floor and bath is on second floor. 15 stairs and 2 rails to second floor. Pt ambulated without AD Independent prior to admission. Pt owns [employer].    OBJECTIVE:   Initial Evaluation  Date: 3/9/20 Treatment Date: Short Term/ Long Term   Goals   AM-PAC 6 Clicks 33/10     Was pt agreeable to Eval/treatment? Yes      Does pt have pain? Zamora catheter pain     Bed Mobility  Rolling: Supervision  Supine to sit: Supervision  Sit to supine: Supervision  Scooting: Supervision toward EOB  Rolling: Independent   Supine to sit: Independent   Sit to supine: Independent   Scooting: Independent    Transfers Sit to stand: Supervision  Stand to sit: Supervision  Stand pivot: Supervision without AD  Sit to stand: Independent   Stand to sit: Independent   Stand pivot: Independent    Ambulation   200 feet without AD with Supervision  400 feet Independent    Stair negotiation: ascended and descended NT  15 step(s) with 1 rail(s) Mod Independent    ROM BUE: Refer to OT note  BLE: WFL     Strength BUE: Refer to OT note  BLE: WFL     Balance Sitting EOB: Supervision  Dynamic Standing: Supervision without AD  Sitting EOB: Independent   Dynamic Standing: Independent      Pt is A & O x: 4 to person, place, month/year, and situation. Sensation: Pt denies numbness and tingling of extremities. Edema: Unremarkable. Patient education  Pt educated on PT role in hospital setting.     Patient response to education:   Pt verbalized understanding Pt demonstrated skill Pt requires

## 2020-03-09 NOTE — H&P
CAPS, Take 2,000 Units by mouth daily  allopurinol (ZYLOPRIM) 300 MG tablet, Take 300 mg by mouth daily  Apixaban (ELIQUIS PO), Take 5 mg by mouth 2 times daily Patient is not on xarelto. Takes eliquis dosage unknown once daily. Receives free from [facility] through a prescription program   ferrous sulfate 325 (65 Fe) MG tablet, Take 325 mg by mouth 2 times daily  hydrALAZINE (APRESOLINE) 50 MG tablet, Take 1 tablet by mouth 3 times daily  calcitRIOL (ROCALTROL) 0.25 MCG capsule, Take 0.25 mcg by mouth daily  atorvastatin (LIPITOR) 40 MG tablet, Take 40 mg by mouth nightly   primidone (MYSOLINE) 50 MG tablet, Take 100 mg by mouth 2 times daily   aspirin 81 MG EC tablet, Take 162 mg by mouth every morning   tamsulosin (FLOMAX) 0.4 MG capsule, Take 1 capsule by mouth daily  pantoprazole (PROTONIX) 40 MG tablet, Take 1 tablet by mouth every morning (before breakfast)    Note that the patient's home medications were reviewed and the above list is accurate to the best of my knowledge at the time of the exam.    Allergies:    Patient has no known allergies. Social History:    reports that he quit smoking about 37 years ago. His smoking use included cigarettes. He started smoking about 70 years ago. He smoked 0.75 packs per day. He has never used smokeless tobacco. He reports previous alcohol use. He reports that he does not use drugs. Family History:   family history includes Cancer in his mother.     REVIEW OF SYSTEMS:  As above in the HPI, otherwise negative    PHYSICAL EXAM:    Vitals:  BP (!) 197/84   Pulse 62   Temp 98.5 °F (36.9 °C) (Temporal)   Resp 16   Ht 5' 9\" (1.753 m)   Wt 237 lb (107.5 kg)   SpO2 97%   BMI 35.00 kg/m²     General appearance: NAD, conversant  HEENT: AT/NC, MMM  Neck: FROM, supple  Lungs: Clear to auscultation  CV: RRR, no MRGs  Abdomen: Soft, non-tender; no masses or HSM, +BS  Extremities: No peripheral edema or digital cyanosis  Skin: no rash, lesions or ulcers  Psych: Calm and cooperative  Neuro: Alert and interactive, nonfocal    LABS:  All labs reviewed. Of note:  CBC:   Lab Results   Component Value Date    WBC 10.9 03/08/2020    RBC 3.68 03/08/2020    HGB 10.0 03/09/2020    HCT 33.1 03/09/2020    MCV 99.2 03/08/2020    MCH 32.1 03/08/2020    MCHC 32.3 03/08/2020    RDW 16.2 03/08/2020     03/08/2020    MPV 11.8 03/08/2020     BMP:    Lab Results   Component Value Date     03/09/2020    K 5.4 03/09/2020    K 4.2 12/20/2019     03/09/2020    CO2 23 03/09/2020    BUN 38 03/09/2020    LABALBU 3.9 03/08/2020    LABALBU 4.8 05/19/2012    CREATININE 1.5 03/09/2020    CALCIUM 8.6 03/09/2020    GFRAA 54 03/09/2020    LABGLOM 45 03/09/2020    GLUCOSE 123 03/09/2020    GLUCOSE 108 05/19/2012     I've personally reviewed the patient's EKG:  AF HR 63 no changes of hyperkalemia    ASSESSMENT/PLAN:  Principal Problem:    Rectal bleeding  Active Problems:    HTN (hypertension), benign    CKD (chronic kidney disease) stage 3, GFR 30-59 ml/min ([facility])    Atrial fibrillation, chronic    Obesity (BMI 30-39. 9)    Hyperlipidemia LDL goal <100    Bilateral carotid artery stenosis    Acute urinary retention    Hyperkalemia  Resolved Problems:    * No resolved hospital problems. *    Serial H/H  Hold Eliquis and aspirin  Give [facility] if bleeding persists  CKD stable  Hyperkalemia stable, no EKG changes. Likely due to home KCl supplement in setting of CKD. No need for further insulin/D50 or calcium gluconate. Monitor on telemetry. If Nephrology has not already seen him, it's OK to cancel that consult. CKD is stable. Apparently had urinary retention in ED. Hanson placed. No obvious quickly reversible factor that I see, such as medications or infection. Can do voiding trial in a few days.     Code status: FULL CODE  Requires continued inpatient level of care   Ana York    10:11 AM  3/9/2020  Cell: [phone]

## 2020-03-09 NOTE — PROGRESS NOTES
Dr Meseret Jin served for bp elevation here of 208/89 and in ambulance [facility]

## 2020-03-09 NOTE — PROGRESS NOTES
at sink, bilateral task  Independent    UB Dressing Supervision     Independent    LB Dressing Supervision   Don/doff socks while seated EOB  Independent    Bathing Supervision    Modified Dukes    Toileting Supervision   Pt using commode in bathroom, completing hygiene and clothing management. Independent    Bed Mobility  Supine to sit: NT   Sit to supine: Supervision   Pt in bathroom upon arrival.  Supine to sit: Independent   Sit to supine: Independent    Functional Transfers Supervision   Sit<>stand  Bed, commode  Independent    Functional Mobility Supervision   Ambulating from bathroom without AD. Independent    Balance Sitting:     Static:  Good    Dynamic:Good  Standing: Fair+     Activity Tolerance Fair+  Pt SOB after walking from bathroom; recovering after seated rest break. Good   Visual/  Perceptual Glasses: No  WFL          Hand Dominance Right     Strength ROM Additional Info:    RUE  4+/5  WFL good  and wfl FMC/dexterity noted during ADL tasks       LUE 4+/5  WFL good  and wfl FMC/dexterity noted during ADL tasks       Hearing: WFL   Sensation:  No c/o numbness or tingling   Tone: WFL   Edema: None noted                             Comments:   Nursing approved therapy session. Upon arrival, patient in bathroom and agreeable to OT session. Therapist educated pt on role of OT. At end of session, patient s with call light and phone within reach, all lines and tubes intact; nursing aware. Pt demonstrated good understanding of education/techniques and decreased independence and safety during completion of ADL/functional transfer/mobility tasks. Pt would benefit from continued skilled OT to increase safety and independence with completion of ADL/IADL tasks for functional independence and quality of life. Skilled occupational therapy services provided include instruction/training on safety and adapted techniques for completion of therapeutic activities, ADLs/IADLs.  Therapist facilitated bed mobility, functional transfers, graded functional activities (static/dynamic sitting, static/dynamic standing, functional reaching), and functional ambulation - providing min cuing (verbal, visual, tactile) on body mechanics, posture, breathing techniques, energy conservation, compensatory strategies, and safety. Therapist facilitated self-care retraining: LB dressing, grooming, toileting tasks - providing min cuing (verbal, visual, tactile) on body mechanics, posture, breathing techniques, energy conservation, compensatory strategies, and safety. Therapist educated pt on compensatory strategies/energy conservation techniques to safely complete ADLs/IADLs. Skilled monitoring of O2 sats, HR, and pt response throughout treatment. Eval Complexity: Low    Assessment of current deficits   Functional mobility [x]  ADLs [x] Strength [x]  Cognition []  Functional transfers  [x] IADLs [x] Safety Awareness [x]  Endurance [x]  Fine Motor Coordination [] Balance [x] Vision/perception [] Sensation []   Gross Motor Coordination [] ROM [] Delirium []                  Motor Control []    Plan of Care: OT 1-3x/week PRN during hospitalization  ADL retraining [x]   Equipment needs [x]   Neuromuscular re-education [x] Energy Conservation Techniques [x]  Functional Transfer training [x] Patient and/or Family Education [x]  Functional Mobility training [x]  Environmental Modifications [x]  Cognitive re-training []   Compensatory techniques for ADLs [x]  Splinting Needs []   Positioning to improve overall function [x]   Therapeutic Activity [x]  Therapeutic Exercise  [x]  Visual/Perceptual: []    Delirium prevention/treatment  []   Other:  []    Rehab Potential: Good for established goals     Patient / Family Goal: To return home. Patient and/or family were instructed on functional diagnosis, prognosis/goals and OT plan of care. Demonstrated good understanding.     Low Evaluation    Time In:3151

## 2020-03-09 NOTE — CONSULTS
GENERAL SURGERY  CONSULT NOTE  3/9/2020    Physician Consulted: Dr. Christopher Kebede  Reason for Consult: rectal bleeding s/p procedure  Referring Physician: MARIAH Avalos  Danny Puentes is a 78 y.o. male who presents for evaluation of rectal bleeding. PMH Afib on eliquis, CKD, diverticulitis with microperforation 12/2019. Patient had outpt procedure on 3/5 with rectal mass excision by Dr. Christopher Kebede. He held his eliquis for 2d prior to procedure. States POD 1 he noted having rectal bleeding. He did restart his eliquis on POD1. He has had rectal bleeding daily since this time. Denies dizziness, CP, SOB. He had hospitalization in December 2019 for acute diverticulitis with microperforation. Hx of cholecystectomy      Past Medical History:   Diagnosis Date    Atrial fibrillation (HCC)     CKD (chronic kidney disease) stage 3, GFR 30-59 ml/min ([address] 12/17/2014    H/O syncope     Hemorrhoids     Hyperlipidemia     Hypertension        Past Surgical History:   Procedure Laterality Date    CAROTID ENDARTERECTOMY      CARPAL TUNNEL RELEASE Bilateral     CHOLECYSTECTOMY      FINGER TRIGGER RELEASE Bilateral     KNEE SURGERY      SHOULDER SURGERY Bilateral     UPPER GASTROINTESTINAL ENDOSCOPY N/A 2/7/2019    EGD BIOPSY performed by Daniela Aldrich MD at [address] [place] [address]       Medications Prior to Admission    Prior to Admission medications    Medication Sig Start Date End Date Taking?  Authorizing Provider   magnesium hydroxide (MILK OF MAGNESIA) 400 MG/5ML suspension Take 30 mLs by mouth daily as needed for Constipation Take with full glass of water 3/7/20  Yes Mini Betancourt MD   potassium chloride (KLOR-CON) 20 MEQ packet Take 20 mEq by mouth daily   Yes Historical Provider, MD   SODIUM BICARBONATE PO Take 650 mg by mouth 2 times daily   Yes Historical Provider, MD   furosemide (LASIX) 40 MG tablet Take 40 mg by mouth daily   Yes Historical Provider, MD   metoprolol succinate (TOPROL XL) 50 MG extended release tablet Take 1 tablet by mouth daily 19  Yes Jamal Puls, DO   amLODIPine (NORVASC) 10 MG tablet Take 1 tablet by mouth daily 19  Yes Jamal Scruggs, DO   vitamin D 25 MCG (1000 UT) CAPS Take 2,000 Units by mouth daily   Yes Historical Provider, MD   allopurinol (ZYLOPRIM) 300 MG tablet Take 300 mg by mouth daily   Yes Historical Provider, MD   Apixaban (ELIQUIS PO) Take 5 mg by mouth 2 times daily Patient is not on xarelto. Takes eliquis dosage unknown once daily.  Receives free from [facility] through a prescription program    Yes Historical Provider, MD   ferrous sulfate 325 (65 Fe) MG tablet Take 325 mg by mouth 2 times daily   Yes Historical Provider, MD   hydrALAZINE (APRESOLINE) 50 MG tablet Take 1 tablet by mouth 3 times daily 19  Yes Historical Provider, MD   calcitRIOL (ROCALTROL) 0.25 MCG capsule Take 0.25 mcg by mouth daily   Yes Historical Provider, MD   atorvastatin (LIPITOR) 40 MG tablet Take 40 mg by mouth nightly  10/1/17  Yes Historical Provider, MD   primidone (MYSOLINE) 50 MG tablet Take 100 mg by mouth 2 times daily  10/25/17  Yes Historical Provider, MD   aspirin 81 MG EC tablet Take 162 mg by mouth every morning    Yes Historical Provider, MD   tamsulosin (FLOMAX) 0.4 MG capsule Take 1 capsule by mouth daily 19   Jamal Scruggs, DO   pantoprazole (PROTONIX) 40 MG tablet Take 1 tablet by mouth every morning (before breakfast) 19   Criss Muse MD       No Known Allergies    Family History   Problem Relation Age of Onset    Cancer Mother        Social History     Tobacco Use    Smoking status: Former Smoker     Packs/day: 0.75     Types: Cigarettes     Start date: 10/31/1949     Last attempt to quit: 1982     Years since quittin.8    Smokeless tobacco: Never Used   Substance Use Topics    Alcohol use: Not Currently     Alcohol/week: 0.0 standard drinks    Drug use: Never         Review of Systems:   General ROS: negative  Hematological and Lymphatic ROS: negative  Respiratory ROS: no cough, shortness of breath, or wheezing  Cardiovascular ROS: no chest pain or dyspnea on exertion  Gastrointestinal ROS: see HPI  Genito-Urinary ROS: no dysuria, trouble voiding, or hematuria  Musculoskeletal ROS: negative      PHYSICAL EXAM:    Vitals:    03/09/20 0530   BP: (!) 196/90   Pulse:    Resp: 18   Temp: 98 °F (36.7 °C)   SpO2:        GENERAL:  NAD. A&Ox3. HEAD:  Normocephalic. Atraumatic. EYES:   No scleral icterus. PERRL. LUNGS:  No increased work of breathing. CARDIOVASCULAR: RR  ABDOMEN:  Soft, non-distended, non-tender. No guarding, rigidity, rebound. EXTREMITIES:   MAEx4. Atraumatic. No LE edema. SKIN:  Warm and dry  RECTAL: No hemorrhoids. + red blood in rectal vault. LABS:    CBC  Recent Labs     03/08/20 2116   WBC 10.9   HGB 11.8*   HCT 36.5*        BMP  Recent Labs     03/08/20  2300      K 5.8*      CO2 24   BUN 48*   CREATININE 1.7*   CALCIUM 8.7     Liver Function  Recent Labs     03/08/20 2116   BILITOT 0.3   AST 14   ALT 12   ALKPHOS 91   PROT 6.4   LABALBU 3.9     No results for input(s): LACTATE in the last 72 hours. Recent Labs     03/08/20 2116   INR 1.5       RADIOLOGY    No results found. ASSESSMENT/PLAN:  78 y.o. male with rectal bleeding s/p 3/5 rectal mass excision. Recommend hold eliquis  Ok for diet  q12 Cbc  Will follow    Plan discussed with Dr. Ray Allen.     Titi Kennedy DO  Resident, PGY-1  3/9/2020  8:05 AM

## 2020-03-09 NOTE — ED NOTES
Prior to leaving pt had bm with large amount of bright red blood clots      Jennifer Yoon RN  03/09/20 5422

## 2020-03-10 VITALS
TEMPERATURE: 98.3 F | WEIGHT: 237 LBS | DIASTOLIC BLOOD PRESSURE: 62 MMHG | RESPIRATION RATE: 18 BRPM | HEART RATE: 65 BPM | OXYGEN SATURATION: 96 % | BODY MASS INDEX: 35.1 KG/M2 | SYSTOLIC BLOOD PRESSURE: 180 MMHG | HEIGHT: 69 IN

## 2020-03-10 LAB
ANION GAP SERPL CALCULATED.3IONS-SCNC: 10 MMOL/L (ref 7–16)
BUN BLDV-MCNC: 30 MG/DL (ref 8–23)
CALCIUM SERPL-MCNC: 8.9 MG/DL (ref 8.6–10.2)
CHLORIDE BLD-SCNC: 108 MMOL/L (ref 98–107)
CO2: 22 MMOL/L (ref 22–29)
CREAT SERPL-MCNC: 1.4 MG/DL (ref 0.7–1.2)
EKG ATRIAL RATE: 312 BPM
EKG Q-T INTERVAL: 428 MS
EKG QRS DURATION: 78 MS
EKG QTC CALCULATION (BAZETT): 437 MS
EKG R AXIS: 49 DEGREES
EKG T AXIS: 65 DEGREES
EKG VENTRICULAR RATE: 63 BPM
GFR AFRICAN AMERICAN: 59
GFR NON-AFRICAN AMERICAN: 49 ML/MIN/1.73
GLUCOSE BLD-MCNC: 109 MG/DL (ref 74–99)
HCT VFR BLD CALC: 31.2 % (ref 37–54)
HCT VFR BLD CALC: 32.6 % (ref 37–54)
HEMOGLOBIN: 9.5 G/DL (ref 12.5–16.5)
HEMOGLOBIN: 9.9 G/DL (ref 12.5–16.5)
POTASSIUM SERPL-SCNC: 4.9 MMOL/L (ref 3.5–5)
SODIUM BLD-SCNC: 140 MMOL/L (ref 132–146)

## 2020-03-10 PROCEDURE — 85018 HEMOGLOBIN: CPT

## 2020-03-10 PROCEDURE — 36415 COLL VENOUS BLD VENIPUNCTURE: CPT

## 2020-03-10 PROCEDURE — 93010 ELECTROCARDIOGRAM REPORT: CPT | Performed by: INTERNAL MEDICINE

## 2020-03-10 PROCEDURE — 85014 HEMATOCRIT: CPT

## 2020-03-10 PROCEDURE — 2580000003 HC RX 258: Performed by: NURSE PRACTITIONER

## 2020-03-10 PROCEDURE — 51798 US URINE CAPACITY MEASURE: CPT

## 2020-03-10 PROCEDURE — 6370000000 HC RX 637 (ALT 250 FOR IP): Performed by: NURSE PRACTITIONER

## 2020-03-10 PROCEDURE — 80048 BASIC METABOLIC PNL TOTAL CA: CPT

## 2020-03-10 RX ORDER — HYDRALAZINE HYDROCHLORIDE 50 MG/1
75 TABLET, FILM COATED ORAL 3 TIMES DAILY
Qty: 90 TABLET | Refills: 2 | Status: SHIPPED | OUTPATIENT
Start: 2020-03-10

## 2020-03-10 RX ORDER — HYDRALAZINE HYDROCHLORIDE 50 MG/1
100 TABLET, FILM COATED ORAL 3 TIMES DAILY
Status: DISCONTINUED | OUTPATIENT
Start: 2020-03-10 | End: 2020-03-10 | Stop reason: HOSPADM

## 2020-03-10 RX ADMIN — TAMSULOSIN HYDROCHLORIDE 0.4 MG: 0.4 CAPSULE ORAL at 08:13

## 2020-03-10 RX ADMIN — HYDRALAZINE HYDROCHLORIDE 50 MG: 50 TABLET, FILM COATED ORAL at 08:12

## 2020-03-10 RX ADMIN — HYDRALAZINE HYDROCHLORIDE 50 MG: 50 TABLET, FILM COATED ORAL at 13:42

## 2020-03-10 RX ADMIN — PRIMIDONE 100 MG: 50 TABLET ORAL at 08:13

## 2020-03-10 RX ADMIN — FERROUS SULFATE TAB 325 MG (65 MG ELEMENTAL FE) 325 MG: 325 (65 FE) TAB at 16:18

## 2020-03-10 RX ADMIN — SODIUM BICARBONATE 650 MG: 650 TABLET ORAL at 08:13

## 2020-03-10 RX ADMIN — CALCITRIOL 0.25 MCG: 0.25 CAPSULE ORAL at 08:13

## 2020-03-10 RX ADMIN — METOPROLOL SUCCINATE 50 MG: 50 TABLET, EXTENDED RELEASE ORAL at 08:13

## 2020-03-10 RX ADMIN — SODIUM CHLORIDE, PRESERVATIVE FREE 10 ML: 5 INJECTION INTRAVENOUS at 08:14

## 2020-03-10 RX ADMIN — PANTOPRAZOLE SODIUM 40 MG: 40 TABLET, DELAYED RELEASE ORAL at 06:10

## 2020-03-10 RX ADMIN — FERROUS SULFATE TAB 325 MG (65 MG ELEMENTAL FE) 325 MG: 325 (65 FE) TAB at 08:12

## 2020-03-10 RX ADMIN — AMLODIPINE BESYLATE 10 MG: 10 TABLET ORAL at 08:14

## 2020-03-10 ASSESSMENT — PAIN SCALES - GENERAL
PAINLEVEL_OUTOF10: 0
PAINLEVEL_OUTOF10: 0

## 2020-03-10 NOTE — PROGRESS NOTES
[facility] MD Sierra Lynn MD Chanda Stands, MD. Rema Escobar MD   Progress Note    3/10/2020    SUBJECTIVE:   No complaints . Hanson out   Stable vitals . PROBLEM LIST:    Principal Problem:    Rectal bleeding  Active Problems:    HTN (hypertension), benign    CKD (chronic kidney disease) stage 3, GFR 30-59 ml/min (HCC)    Atrial fibrillation, chronic    Obesity (BMI 30-39. 9)    Hyperlipidemia LDL goal <100    Bilateral carotid artery stenosis    Acute urinary retention    Hyperkalemia  Resolved Problems:    * No resolved hospital problems. *       DIET:    DIET GENERAL; Low Potassium       Allergies : Patient has no known allergies. Past Medical History:   Diagnosis Date    At[facility] fibrillation [facility] [address]     CKD (chronic kidney disease) stage 3, GFR 30-59 ml/min ([facility] [address] 12/17/2014    H/O syncope     Hemorrhoids     Hyperlipidemia     Hypertension        Past Surgical History:   Procedure Laterality Date    CAROTID ENDARTERECTOMY      CARPAL TUNNEL RELEASE Bilateral     CHOLECYSTECTOMY      FINGER TRIGGER RELEASE Bilateral     KNEE SURGERY      SHOULDER SURGERY Bilateral     UPPER GASTROINTESTINAL ENDOSCOPY N/A 2/7/2019    EGD BIOPSY performed by Barrera Wilder MD at [facility] ENDOSCOPY       Family History   Problem Relation Age of Onset    Cancer Mother         reports that he quit smoking about 37 years ago. His smoking use included cigarettes. He started smoking about 70 years ago. He smoked 0.75 packs per day. He has never used smokeless tobacco. He reports previous alcohol use. He reports that he does not use drugs. Review of Systems:   Constitutional: no fevers , no chills , feels ok   Eyes: no eye pain , no itching , no drainage  Ears, nose, mouth, throat, and face: no ear ,nose pain , hearing is ok ,no nasal drainage   Respiratory: no sob ,no cough ,no wheezing . Cardiovascular: no chest pain , no palpitation ,no sob .    Gastrointestinal: no nausea, vomiting , constipation , no abdominal pain . Genitourinary:no urinary retention , no burning , dysuria . No polyuria   Hematologic/lymphatic: no bleeding , no cougulation issues . Musculoskeletal:no joint pain , no swelling . Neurological: no headaches ,no weakness , no numbness . Endocrine: no thirst , no weight issues .      MEDS (scheduled):    amLODIPine  10 mg Oral Daily    atorvastatin  40 mg Oral Nightly    calcitRIOL  0.25 mcg Oral Daily    ferrous sulfate  325 mg Oral BID    hydrALAZINE  50 mg Oral TID    metoprolol succinate  50 mg Oral Daily    pantoprazole  40 mg Oral QAM AC    primidone  100 mg Oral BID    sodium bicarbonate  650 mg Oral BID    tamsulosin  0.4 mg Oral Daily    sodium chloride flush  10 mL Intravenous 2 times per day       MEDS (infusions):      MEDS (prn):  sodium chloride flush, acetaminophen **OR** acetaminophen, promethazine **OR** ondansetron    PHYSICAL EXAM:     Patient Vitals for the past 24 hrs:   BP Temp Temp src Pulse Resp SpO2   03/10/20 1500 (!) 180/62 98.3 °F (36.8 °C) Temporal 65 18 96 %   03/10/20 1330 (!) 164/62 -- -- 66 -- --   03/10/20 0945 (!) 178/62 -- -- -- -- --   03/10/20 0812 (!) 194/78 97 °F (36.1 °C) Temporal 60 18 --   03/10/20 0730 -- 96.6 °F (35.9 °C) Temporal 58 18 95 %   03/09/20 2300 (!) 181/81 98.8 °F (37.1 °C) Temporal 59 16 96 %   @      Intake/Output Summary (Last 24 hours) at 3/10/2020 1539  Last data filed at 3/10/2020 1503  Gross per 24 hour   Intake 840 ml   Output 1200 ml   Net -360 ml         Wt Readings from Last 3 Encounters:   03/09/20 237 lb (107.5 kg)   03/07/20 230 lb (104.3 kg)   02/05/20 230 lb (104.3 kg)       Constitutional:  in no acute distress  Oral: mucus membranes moist  Neck: no JVD  Cardiovascular: S1, S2 regular rhythm, no murmur,or rub  Respiratory:  No crackles, no wheeze  Gastrointestinal:  Soft, nontender, nondistended, NABS  Ext: no edema, feet warm  Skin: dry, no rash  Neuro: awake, alert, interactive      DATA:    Recent Labs     03/08/20 2116 03/09/20  0748 03/09/20  2342 03/10/20  0723   WBC 10.9  --   --   --    HGB 11.8* 10.0* 9.5* 9.9*   HCT 36.5* 33.1* 31.2* 32.6*   MCV 99.2  --   --   --      --   --   --      Recent Labs     03/08/20 2116 03/08/20  2300 03/09/20  1247 03/10/20  0723    139 142 140   K 6.1* 5.8* 5.4* 4.9    106 110* 108*   CO2 25 24 23 22   BUN 49* 48* 38* 30*   CREATININE 1.9* 1.7* 1.5* 1.4*   ALT 12  --   --   --    AST 14  --   --   --    BILITOT 0.3  --   --   --    ALKPHOS 91  --   --   --        Lab Results   Component Value Date    LABPROT 0.4 (H) 12/20/2019    LABPROT 0.4 12/20/2019    LABPROT 0.4 (H) 12/20/2019    LABPROT 0.4 12/20/2019       ASSESSMENT / RECOMMENDATIONS:      -Chronic kidney disease stage III baseline cr 1.5-1.7 nonproteinuric   -Hyperkalemia 2/2 # 1 , po kcl supplementation at home along with absorption of blood products from GI tracts .   -Rectal bleeding   -Atrial fibrilliation   -B/L carotid artery stenosis      Plan :     Cr stable at baseline   Clinically euvolemic   Will increase hydralazine to 100 tid . He is on calcitriol at home , recheck PTH to see where is it standing . Hold K supplementation .  F/u K numbers       Electronically signed by Trace Vivar MD on 3/10/2020 at 3:39 PM

## 2020-03-10 NOTE — PROGRESS NOTES
GENERAL SURGERY  DAILY PROGRESS NOTE    Date:3/10/2020       KGUB:[id number]  Patient Name:Mamadou Ovalle     YOB: 1940     Age:79 y.o. Subjective:  Denies bleeding overnight. Had no bowel movements at all. Tolerated diet. Objective:  BP (!) 181/81   Pulse 59   Temp 98.8 °F (37.1 °C) (Temporal)   Resp 16   Ht 5' 9\" (1.753 m)   Wt 237 lb (107.5 kg)   SpO2 96%   BMI 35.00 kg/m²   Temp (24hrs), Av.6 °F (37 °C), Min:98.4 °F (36.9 °C), Max:98.8 °F (37.1 °C)      I/O (24Hr): Intake/Output Summary (Last 24 hours) at 3/10/2020 0636  Last data filed at 3/10/2020 0617  Gross per 24 hour   Intake 578 ml   Output 2050 ml   Net -1472 ml       GENERAL:  No acute distress. Alert and interactive. LUNGS:  No cough. Nonlabored breathing on room air. CARDIOVASC:  Normal rate, no cyanosis. ABDOMEN:  Soft, non-distended, non-tender. No guarding / rigidity / rebound. Assessment:  78 y.o. male with rectal bleeding s/p 3/5 rectal mass excision.     Plan:  - Hold eliquis for 1 more week  -OK for diet and discharge    Electronically signed by Helga Ibarra MD on 3/10/2020 at 6:36 AM and Marisa Jara DO  3/10/20  11:22 AM EDT    Seen/examined  Agree with above  [email or web address]

## 2020-03-10 NOTE — DISCHARGE SUMMARY
Physician Discharge Summary     Patient ID:  Ravin Egan  [id number]  78 y.o.  1940    Admit date: 3/8/2020    Discharge date and time:  03/10/20     Admission Diagnoses:   Rectal bleeding    Discharge Diagnoses:   Principal Problem:    Rectal bleeding Due to recent mass excision   Active Problems:    HTN (hypertension), benign    CKD (chronic kidney disease) stage 3, GFR 30-59 ml/min (HCC)    Atrial fibrillation, chronic    Obesity (BMI 30-39. 9)    Hyperlipidemia LDL goal <100    Bilateral carotid artery stenosis    Acute urinary retention    Hyperkalemia  Resolved Problems:    * No resolved hospital problems. *       Consults: general surgery    Procedures: none    Hospital Course:   Patient presented with rectal bleeding shortly after having a rectal mass excision by general surgery. He had restarted his Eliquis on postoperative day 1 or 2. He was mildly anemic on presentation and his hemoglobin stabilized around 10, compared to baseline of about 12. He remained hemodynamically stable. His Eliquis is on hold. His aspirin is on hold as well, and I am not 100% sure if he is on aspirin for primary or secondary prophylaxis. General surgery saw him and has recommended supportive care and observation. He had no further rectal bleeding in the 24 hours prior to discharge. Surgery recommends that he can resume Eliquis in 1 week. Additionally, I would recommend that he hold aspirin until he follows up with primary care to determine when and whether to restart this. He can stay on his iron supplement and have repeat CBC in 1 to 2 weeks with primary care. He had mild hyperkalemia on admission without EKG changes; resolved with a dose of kayexalate. I would recommend discontinuing his potassium supplement and repeat BMP in 1-2 weeks with primary care. I called him after discharge to inform him of this, as it was not on his initial discharge med rec.     Reportedly the patient has had a Hanson catheter since surgery. He is currently on Flomax. He had voiding trial during this admission which he passed without issues. PVR < 100 cc after 4-6 hours with successful void. Discharge Exam:  Vitals:    03/10/20 0262 03/10/20 0945 03/10/20 1330 03/10/20 1500   BP: (!) 194/78 (!) 178/62 (!) 164/62 (!) 180/62   Pulse: 60  66 65   Resp: 18   18   Temp: 97 °F (36.1 °C)   98.3 °F (36.8 °C)   TempSrc: Temporal   Temporal   SpO2:    96%   Weight:       Height:          General: Pleasant male in no acute distress  Cardiac: Regular rate and rhythm without murmurs  Lungs: Clear bilaterally  Abdomen: Present bowel sounds soft nontender nondistended without rebound or guarding      Condition:  Stable    Disposition: home    Patient Instructions:   Current Discharge Medication List      CONTINUE these medications which have NOT CHANGED    Details   magnesium hydroxide (MILK OF MAGNESIA) 400 MG/5ML suspension Take 30 mLs by mouth daily as needed for Constipation Take with full glass of water  Qty: 180 mL, Refills: 0             SODIUM BICARBONATE PO Take 650 mg by mouth 2 times daily      furosemide (LASIX) 40 MG tablet Take 40 mg by mouth daily      metoprolol succinate (TOPROL XL) 50 MG extended release tablet Take 1 tablet by mouth daily  Qty: 30 tablet, Refills: 3      amLODIPine (NORVASC) 10 MG tablet Take 1 tablet by mouth daily  Qty: 30 tablet, Refills: 3      vitamin D 25 MCG (1000 UT) CAPS Take 2,000 Units by mouth daily      allopurinol (ZYLOPRIM) 300 MG tablet Take 300 mg by mouth daily      Apixaban (ELIQUIS PO) Take 5 mg by mouth 2 times daily Patient is not on xarelto. Takes eliquis dosage unknown once daily.  Receives free from [place] through a prescription program       ferrous sulfate 325 (65 Fe) MG tablet Take 325 mg by mouth 2 times daily      hydrALAZINE (APRESOLINE) 50 MG tablet Take 1 tablet by mouth 3 times daily  Refills: 2      calcitRIOL (ROCALTROL) 0.25 MCG capsule Take 0.25 mcg by mouth daily      atorvastatin (LIPITOR) 40 MG tablet Take 40 mg by mouth nightly   Refills: 1      primidone (MYSOLINE) 50 MG tablet Take 100 mg by mouth 2 times daily   Refills: 3      tamsulosin (FLOMAX) 0.4 MG capsule Take 1 capsule by mouth daily  Qty: 30 capsule, Refills: 3      pantoprazole (PROTONIX) 40 MG tablet Take 1 tablet by mouth every morning (before breakfast)  Qty: 30 tablet, Refills: 5         STOP taking these medications       aspirin 81 MG EC tablet Comments:   Reason for Stopping:     potassium chloride (KLOR-CON) 20 MEQ packet Take 20 mEq by mouth daily            Activity: activity as tolerated  Diet: regular diet    Follow-up with PCP in 1 week.     Note that over 30 minutes was spent in preparing discharge papers, discussing discharge with patient, medication review, etc.    Signed:  Jimmy Tadeo    3/10/2020  3:26 PM

## 2020-03-11 ENCOUNTER — CARE COORDINATION (OUTPATIENT)
Dept: CASE MANAGEMENT | Age: 80
End: 2020-03-11

## 2020-03-11 NOTE — CARE COORDINATION
Patient states his Eliquis is on hold until 3/17/20 and he is not to resume aspirin or potassium until seen by his PCP. CTN reminded patient of need for repeat labs(CBC and BMP)and to check with PCP office, patient states he will do so. -Patient states he does not have DM and was unaware of any heart problem other than his history of AF. Patient states he does weigh himself 2-3 times a week and range is 225-230 pounds. CTN provided education to patient as related to CHF(weight gain 2-3 pounds overnight/5 pounds in a week, SOB, swelling)and to contact his PCP if he should experience any of these symptoms. Patient denies any SOB or swelling.  -Patient states he will drive himself to his provider appointments. -CTN explained that a member of the Care Transition Central Team will be contacting him  for further follow up calls, patient  is in agreement and denies any other needs or concerns at this time. -CTN will hand off to the Care Transition Central team to follow.                      Follow Up  Future Appointments   Date Time Provider Rox Bowling   2/10/2021  9:00 AM [facility] Ranjitmichael   2/10/2021  9:30 AM Jose Armando Kim MD [facility]       Ruddy Schwartz RN

## 2020-03-18 ENCOUNTER — CARE COORDINATION (OUTPATIENT)
Dept: CASE MANAGEMENT | Age: 80
End: 2020-03-18

## 2020-04-01 ENCOUNTER — CARE COORDINATION (OUTPATIENT)
Dept: CASE MANAGEMENT | Age: 80
End: 2020-04-01

## 2020-04-08 ENCOUNTER — CARE COORDINATION (OUTPATIENT)
Dept: CASE MANAGEMENT | Age: 80
End: 2020-04-08

## 2020-04-29 ENCOUNTER — CARE COORDINATION (OUTPATIENT)
Dept: CASE MANAGEMENT | Age: 80
End: 2020-04-29

## 2020-05-06 ENCOUNTER — CARE COORDINATION (OUTPATIENT)
Dept: CASE MANAGEMENT | Age: 80
End: 2020-05-06

## 2020-05-06 NOTE — CARE COORDINATION
[facility] Transitions Follow Up Call    2020    Patient: Saloni Mane  Patient : 1940  MRN: <[id number]>  Reason for Admission:   Discharge Date: 3/10/20 RARS: Readmission Risk Score: 24         Spoke with: Raulito Cho, patient    Care Transitions Subsequent and Final Call    Subsequent and Final Calls  Do you have any ongoing symptoms?:  No  Have your medications changed?:  No  Do you have any questions related to your medications?:  No  Do you currently have any active services?:  No  Do you have any needs or concerns that I can assist you with?:  No  Identified Barriers:  None  Care Transitions Interventions  Other Interventions: Follow Up:  Contacted patient for BPCI-A follow up. Raulito Cho stated he is doing \"just fine\". Denies having any issues with bleeding. No blood noted in stool or dark tarry stools. He denies having any urinary issues. He is eating and staying hydrated. He has all of his medications. He does not have any questions or concerns at this time. Will continue to follow.       Future Appointments   Date Time Provider Rox Bowling   2/10/2021  9:00 AM [facility] You   2/10/2021  9:30 AM Radha Mars MD [facility]       Sarah Padron RN

## 2020-05-20 ENCOUNTER — CARE COORDINATION (OUTPATIENT)
Dept: CASE MANAGEMENT | Age: 80
End: 2020-05-20

## 2020-05-20 NOTE — CARE COORDINATION
[facility] Trans[facility] Follow Up Call    2020    Patient: Real Nieves  Patient : 1940   MRN: <[id number]>  Reason for Admission:   Discharge Date: 3/10/20 RARS: Readmission Risk Score: 24    Follow Up: Attempted to contact patient for BPCI-A follow up. Unable to reach patient. Left message on voicemail with contact information and request for call back.       Future Appointments   Date Time Provider Rox Bowling   2/10/2021  9:00 AM [facility] You   2/10/2021  9:30 AM Drew Allen MD VAS/MED [facility]       Jim Sloan RN

## 2020-06-01 ENCOUNTER — CARE COORDINATION (OUTPATIENT)
Dept: CASE MANAGEMENT | Age: 80
End: 2020-06-01

## 2020-06-01 NOTE — CARE COORDINATION
[facility] Transitions Follow Up Call    2020    Patient: Kathy Montoya  Patient : 1940   MRN: <[id number]>  Reason for Admission:   Discharge Date: 3/10/20 RARS: Readmission Risk Score: 24         Spoke with: [facility] Transitions Subsequent and Final Call    Subsequent and Final Calls  Do you have any ongoing symptoms?:  No  Have your medications changed?:  No  Do you have any questions related to your medications?:  No  Do you currently have any active services?:  No  Do you have any needs or concerns that I can assist you with?:  No  Care Transitions Interventions  Other Interventions:        Pt states he's doing well. States he's going to a doctor's appointment this afternoon, and denies symptoms, questions or concerns at this time.      Follow Up  Future Appointments   Date Time Provider Rox Bowling   2/10/2021  9:00 AM [facility] Rajjasmeetmichael   2/10/2021  9:30 AM Maurizio Gonzalez MD VASC/MED [facility]       Sayra Hoffman

## 2020-06-08 ENCOUNTER — CARE COORDINATION (OUTPATIENT)
Dept: CASE MANAGEMENT | Age: 80
End: 2020-06-08

## 2020-07-10 ENCOUNTER — OFFICE VISIT (OUTPATIENT)
Dept: FAMILY MEDICINE CLINIC | Age: 80
End: 2020-07-10
Payer: MEDICARE

## 2020-07-10 VITALS
TEMPERATURE: 97.7 F | HEART RATE: 92 BPM | OXYGEN SATURATION: 97 % | BODY MASS INDEX: 34.26 KG/M2 | SYSTOLIC BLOOD PRESSURE: 134 MMHG | DIASTOLIC BLOOD PRESSURE: 84 MMHG | WEIGHT: 232 LBS

## 2020-07-10 PROCEDURE — G8427 DOCREV CUR MEDS BY ELIG CLIN: HCPCS | Performed by: FAMILY MEDICINE

## 2020-07-10 PROCEDURE — 4040F PNEUMOC VAC/ADMIN/RCVD: CPT | Performed by: FAMILY MEDICINE

## 2020-07-10 PROCEDURE — G8417 CALC BMI ABV UP PARAM F/U: HCPCS | Performed by: FAMILY MEDICINE

## 2020-07-10 PROCEDURE — 1123F ACP DISCUSS/DSCN MKR DOCD: CPT | Performed by: FAMILY MEDICINE

## 2020-07-10 PROCEDURE — 99214 OFFICE O/P EST MOD 30 MIN: CPT | Performed by: FAMILY MEDICINE

## 2020-07-10 PROCEDURE — 1036F TOBACCO NON-USER: CPT | Performed by: FAMILY MEDICINE

## 2020-07-10 RX ORDER — CEFDINIR 300 MG/1
300 CAPSULE ORAL 2 TIMES DAILY
Qty: 20 CAPSULE | Refills: 0 | Status: SHIPPED | OUTPATIENT
Start: 2020-07-10 | End: 2020-07-20

## 2020-07-10 ASSESSMENT — ENCOUNTER SYMPTOMS
GASTROINTESTINAL NEGATIVE: 1
RESPIRATORY NEGATIVE: 1
COLOR CHANGE: 1

## 2020-07-10 NOTE — PROGRESS NOTES
7/10/20  Hertford Endo : 1940 Sex: male  Age: 78 y.o. Chief Complaint   Patient presents with    Foot Pain     L sided/no injury/1d       Alisa Zelaya is a 73-year-old white male with a chief complaint of left foot discomfort and left lower extremity edema. He had a Doppler ultrasound he states 2 weeks ago without any report as of yet he does not complain of any cough shortness of breath or hemoptysis. He had the minimal edema in the left lower extremities below the knee along with pedal edema the right lower extremity is mildly edematous. The right side is 1+ the left side is 2-3+. Review of Systems   Constitutional: Negative. Respiratory: Negative. Cardiovascular: Negative. Gastrointestinal: Negative. Musculoskeletal: Positive for gait problem. Skin: Positive for color change and wound. Current Outpatient Medications:     hydrALAZINE (APRESOLINE) 50 MG tablet, Take 1.5 tablets by mouth 3 times daily, Disp: 90 tablet, Rfl: 2    magnesium hydroxide (MILK OF MAGNESIA) 400 MG/5ML suspension, Take 30 mLs by mouth daily as needed for Constipation Take with full glass of water, Disp: 180 mL, Rfl: 0    SODIUM BICARBONATE PO, Take 650 mg by mouth 2 times daily, Disp: , Rfl:     furosemide (LASIX) 40 MG tablet, Take 40 mg by mouth daily, Disp: , Rfl:     metoprolol succinate (TOPROL XL) 50 MG extended release tablet, Take 1 tablet by mouth daily, Disp: 30 tablet, Rfl: 3    tamsulosin (FLOMAX) 0.4 MG capsule, Take 1 capsule by mouth daily, Disp: 30 capsule, Rfl: 3    amLODIPine (NORVASC) 10 MG tablet, Take 1 tablet by mouth daily, Disp: 30 tablet, Rfl: 3    vitamin D 25 MCG (1000 UT) CAPS, Take 2,000 Units by mouth daily, Disp: , Rfl:     allopurinol (ZYLOPRIM) 300 MG tablet, Take 300 mg by mouth daily, Disp: , Rfl:     Apixaban (ELIQUIS PO), Take 5 mg by mouth 2 times daily Patient is not on xarelto. Takes eliquis dosage unknown once daily.  Receives free from [employer] and [facility] through a prescription program , Disp: , Rfl:     ferrous sulfate 325 (65 Fe) MG tablet, Take 325 mg by mouth 2 times daily, Disp: , Rfl:     pantoprazole (PROTONIX) 40 MG tablet, Take 1 tablet by mouth every morning (before breakfast), Disp: 30 tablet, Rfl: 5    calcitRIOL (ROCALTROL) 0.25 MCG capsule, Take 0.25 mcg by mouth daily, Disp: , Rfl:     atorvastatin (LIPITOR) 40 MG tablet, Take 40 mg by mouth nightly , Disp: , Rfl: 1    primidone (MYSOLINE) 50 MG tablet, Take 100 mg by mouth 2 times daily , Disp: , Rfl: 3  No Known Allergies    Past Medical History:   Diagnosis Date    Atrial fibrillation (HCC)     CKD (chronic kidney disease) stage 3, GFR 30-59 ml/min (HCC) 2014    H/O syncope     Hemorrhoids     Hyperlipidemia     Hypertension      Past Surgical History:   Procedure Laterality Date    CAROTID ENDARTERECTOMY      CARPAL TUNNEL RELEASE Bilateral     CHOLECYSTECTOMY      FINGER TRIGGER RELEASE Bilateral     KNEE SURGERY      SHOULDER SURGERY Bilateral     UPPER GASTROINTESTINAL ENDOSCOPY N/A 2019    EGD BIOPSY performed by Ashwini Aguilar MD at [address] [place] [address]     Family History   Problem Relation Age of Onset    Cancer Mother      Social History     Tobacco Use    Smoking status: Former Smoker     Packs/day: 0.75     Types: Cigarettes     Start date: 10/31/1949     Last attempt to quit: 1982     Years since quittin.1    Smokeless tobacco: Never Used   Substance Use Topics    Alcohol use: Not Currently     Alcohol/week: 0.0 standard drinks    Drug use: Never        Vitals:    07/10/20 1152   BP: 134/84   Pulse: 92   Temp: 97.7 °F (36.5 °C)   SpO2: 97%   Weight: 232 lb (105.2 kg)       Physical Exam  Vitals signs reviewed. Constitutional:       Appearance: Normal appearance. HENT:      Head: Normocephalic and atraumatic. Cardiovascular:      Rate and Rhythm: Normal rate and regular rhythm. Heart sounds: No murmur.    Pulmonary:      Effort: Pulmonary effort is normal.      Breath sounds: Normal breath sounds. Musculoskeletal:         General: Swelling and tenderness present. No signs of injury. Right lower leg: Edema present. Left lower leg: Edema present. Skin:     General: Skin is warm and dry. Findings: Lesion and rash present. Neurological:      Mental Status: He is alert. Assessment and Plan:  There are no diagnoses linked to this encounter. No orders of the defined types were placed in this encounter. Patient advised to follow up with PCP as needed. Seen By:  Blair Hoskins DO  This patient was placed on Omnicef 300 mg daily in addition to increasing his Lasix to 20 mg every day at 4PM to combat the edema he is also instructed how to elevate his leg properly to reduce the edema and is to see his PCP as soon as possible.

## 2021-01-01 ENCOUNTER — HOSPITAL ENCOUNTER (INPATIENT)
Age: 81
LOS: 8 days | Discharge: ANOTHER HOSPITAL | DRG: 683 | End: 2021-06-23
Attending: SURGERY | Admitting: INTERNAL MEDICINE
Payer: MEDICARE

## 2021-01-01 ENCOUNTER — HOSPITAL ENCOUNTER (EMERGENCY)
Age: 81
Discharge: LEFT BEFORE DISCHARGE | End: 2021-04-29
Attending: EMERGENCY MEDICINE
Payer: MEDICARE

## 2021-01-01 ENCOUNTER — APPOINTMENT (OUTPATIENT)
Dept: ULTRASOUND IMAGING | Age: 81
DRG: 683 | End: 2021-01-01
Attending: SURGERY
Payer: MEDICARE

## 2021-01-01 ENCOUNTER — APPOINTMENT (OUTPATIENT)
Dept: GENERAL RADIOLOGY | Age: 81
End: 2021-01-01
Payer: MEDICARE

## 2021-01-01 ENCOUNTER — ANESTHESIA EVENT (OUTPATIENT)
Dept: OPERATING ROOM | Age: 81
DRG: 683 | End: 2021-01-01
Payer: MEDICARE

## 2021-01-01 ENCOUNTER — OFFICE VISIT (OUTPATIENT)
Dept: VASCULAR SURGERY | Age: 81
End: 2021-01-01
Payer: MEDICARE

## 2021-01-01 ENCOUNTER — TELEPHONE (OUTPATIENT)
Dept: CARDIOTHORACIC SURGERY | Age: 81
End: 2021-01-01

## 2021-01-01 ENCOUNTER — ANESTHESIA (OUTPATIENT)
Dept: OPERATING ROOM | Age: 81
DRG: 683 | End: 2021-01-01
Payer: MEDICARE

## 2021-01-01 ENCOUNTER — PREP FOR PROCEDURE (OUTPATIENT)
Dept: SURGERY | Age: 81
End: 2021-01-01

## 2021-01-01 ENCOUNTER — APPOINTMENT (OUTPATIENT)
Dept: GENERAL RADIOLOGY | Age: 81
DRG: 683 | End: 2021-01-01
Attending: SURGERY
Payer: MEDICARE

## 2021-01-01 ENCOUNTER — HOSPITAL ENCOUNTER (OUTPATIENT)
Dept: CARDIOLOGY | Age: 81
Discharge: HOME | End: 2021-03-03
Payer: MEDICARE

## 2021-01-01 ENCOUNTER — HOSPITAL ENCOUNTER (EMERGENCY)
Age: 81
Discharge: HOME | End: 2021-05-29
Attending: FAMILY MEDICINE
Payer: MEDICARE

## 2021-01-01 VITALS
SYSTOLIC BLOOD PRESSURE: 142 MMHG | DIASTOLIC BLOOD PRESSURE: 70 MMHG | RESPIRATION RATE: 16 BRPM | OXYGEN SATURATION: 96 % | TEMPERATURE: 98.2 F | HEART RATE: 60 BPM

## 2021-01-01 VITALS
DIASTOLIC BLOOD PRESSURE: 63 MMHG | SYSTOLIC BLOOD PRESSURE: 155 MMHG | OXYGEN SATURATION: 97 % | HEIGHT: 69 IN | WEIGHT: 232.2 LBS | TEMPERATURE: 97.8 F | HEART RATE: 72 BPM | BODY MASS INDEX: 34.39 KG/M2 | RESPIRATION RATE: 16 BRPM

## 2021-01-01 VITALS
OXYGEN SATURATION: 98 % | DIASTOLIC BLOOD PRESSURE: 72 MMHG | BODY MASS INDEX: 32.58 KG/M2 | HEART RATE: 62 BPM | SYSTOLIC BLOOD PRESSURE: 158 MMHG | RESPIRATION RATE: 15 BRPM | TEMPERATURE: 98.1 F | HEIGHT: 69 IN | WEIGHT: 220 LBS

## 2021-01-01 VITALS — DIASTOLIC BLOOD PRESSURE: 77 MMHG | OXYGEN SATURATION: 100 % | SYSTOLIC BLOOD PRESSURE: 171 MMHG

## 2021-01-01 VITALS
SYSTOLIC BLOOD PRESSURE: 132 MMHG | DIASTOLIC BLOOD PRESSURE: 74 MMHG | BODY MASS INDEX: 33.33 KG/M2 | WEIGHT: 225 LBS | HEIGHT: 69 IN

## 2021-01-01 DIAGNOSIS — I65.23 CAROTID ARTERY STENOSIS, ASYMPTOMATIC, BILATERAL: Primary | ICD-10-CM

## 2021-01-01 DIAGNOSIS — I65.23 BILATERAL CAROTID ARTERY STENOSIS: ICD-10-CM

## 2021-01-01 DIAGNOSIS — Z53.29 LEFT AGAINST MEDICAL ADVICE: ICD-10-CM

## 2021-01-01 DIAGNOSIS — R18.8 OTHER ASCITES: ICD-10-CM

## 2021-01-01 DIAGNOSIS — R18.8 OTHER ASCITES: Primary | ICD-10-CM

## 2021-01-01 DIAGNOSIS — N17.9 ACUTE RENAL FAILURE SUPERIMPOSED ON STAGE 3A CHRONIC KIDNEY DISEASE, UNSPECIFIED ACUTE RENAL FAILURE TYPE (HCC): ICD-10-CM

## 2021-01-01 DIAGNOSIS — N18.31 ACUTE RENAL FAILURE SUPERIMPOSED ON STAGE 3A CHRONIC KIDNEY DISEASE, UNSPECIFIED ACUTE RENAL FAILURE TYPE (HCC): ICD-10-CM

## 2021-01-01 DIAGNOSIS — E87.5 HYPERKALEMIA: ICD-10-CM

## 2021-01-01 DIAGNOSIS — R60.9 DEPENDENT EDEMA: Primary | ICD-10-CM

## 2021-01-01 DIAGNOSIS — I50.9 ACUTE CONGESTIVE HEART FAILURE, UNSPECIFIED HEART FAILURE TYPE (HCC): ICD-10-CM

## 2021-01-01 LAB
ALBUMIN FLUID: 1.9 G/DL
ALBUMIN SERPL-MCNC: 3.1 G/DL (ref 3.5–5.2)
ALBUMIN SERPL-MCNC: 3.3 G/DL (ref 3.5–5.2)
ALBUMIN SERPL-MCNC: 3.3 G/DL (ref 3.5–5.2)
ALBUMIN SERPL-MCNC: 3.4 G/DL (ref 3.5–5.2)
ALBUMIN SERPL-MCNC: 3.5 G/DL (ref 3.5–5.2)
ALBUMIN SERPL-MCNC: 3.6 G/DL (ref 3.5–5.2)
ALBUMIN SERPL-MCNC: 3.9 G/DL (ref 3.5–5.2)
ALBUMIN SERPL-MCNC: 3.9 G/DL (ref 3.5–5.2)
ALP BLD-CCNC: 213 U/L (ref 40–129)
ALP BLD-CCNC: 229 U/L (ref 40–129)
ALP BLD-CCNC: 242 U/L (ref 40–129)
ALP BLD-CCNC: 244 U/L (ref 40–129)
ALP BLD-CCNC: 247 U/L (ref 40–129)
ALP BLD-CCNC: 249 U/L (ref 40–129)
ALP BLD-CCNC: 254 U/L (ref 40–129)
ALP BLD-CCNC: 281 U/L (ref 40–129)
ALT SERPL-CCNC: 10 U/L (ref 0–40)
ALT SERPL-CCNC: 11 U/L (ref 0–40)
ALT SERPL-CCNC: 11 U/L (ref 0–40)
ALT SERPL-CCNC: 12 U/L (ref 0–40)
ALT SERPL-CCNC: 14 U/L (ref 0–40)
ALT SERPL-CCNC: 22 U/L (ref 0–40)
AMYLASE FLUID: 15 U/L
AMYLASE: 38 U/L (ref 20–100)
ANCA IFA: NORMAL
ANION GAP SERPL CALCULATED.3IONS-SCNC: 10 MMOL/L (ref 7–16)
ANION GAP SERPL CALCULATED.3IONS-SCNC: 11 MMOL/L (ref 7–16)
ANION GAP SERPL CALCULATED.3IONS-SCNC: 12 MMOL/L (ref 7–16)
ANION GAP SERPL CALCULATED.3IONS-SCNC: 13 MMOL/L (ref 7–16)
ANION GAP SERPL CALCULATED.3IONS-SCNC: 9 MMOL/L (ref 7–16)
ANION GAP SERPL CALCULATED.3IONS-SCNC: 9 MMOL/L (ref 7–16)
ANTI-NUCLEAR ANTIBODY (ANA): NEGATIVE
APPEARANCE FLUID: CLEAR
AST SERPL-CCNC: 25 U/L (ref 0–39)
AST SERPL-CCNC: 27 U/L (ref 0–39)
AST SERPL-CCNC: 27 U/L (ref 0–39)
AST SERPL-CCNC: 28 U/L (ref 0–39)
AST SERPL-CCNC: 30 U/L (ref 0–39)
AST SERPL-CCNC: 30 U/L (ref 0–39)
AST SERPL-CCNC: 32 U/L (ref 0–39)
AST SERPL-CCNC: 42 U/L (ref 0–39)
BASOPHILS ABSOLUTE: 0.02 E9/L (ref 0–0.2)
BASOPHILS ABSOLUTE: 0.03 E9/L (ref 0–0.2)
BASOPHILS ABSOLUTE: 0.04 E9/L (ref 0–0.2)
BASOPHILS ABSOLUTE: 0.04 E9/L (ref 0–0.2)
BASOPHILS ABSOLUTE: 0.05 E9/L (ref 0–0.2)
BASOPHILS RELATIVE PERCENT: 0.3 % (ref 0–2)
BASOPHILS RELATIVE PERCENT: 0.4 % (ref 0–2)
BASOPHILS RELATIVE PERCENT: 0.5 % (ref 0–2)
BASOPHILS RELATIVE PERCENT: 0.6 % (ref 0–2)
BASOPHILS RELATIVE PERCENT: 0.6 % (ref 0–2)
BASOPHILS RELATIVE PERCENT: 0.7 % (ref 0–2)
BILIRUB SERPL-MCNC: 0.5 MG/DL (ref 0–1.2)
BILIRUB SERPL-MCNC: 0.6 MG/DL (ref 0–1.2)
BILIRUB SERPL-MCNC: 0.7 MG/DL (ref 0–1.2)
BILIRUB SERPL-MCNC: 0.8 MG/DL (ref 0–1.2)
BILIRUBIN FLUID: 0.31 MG/DL
BODY FLUID CULTURE, STERILE: NORMAL
BUN BLDV-MCNC: 102 MG/DL (ref 6–23)
BUN BLDV-MCNC: 105 MG/DL (ref 6–23)
BUN BLDV-MCNC: 107 MG/DL (ref 6–23)
BUN BLDV-MCNC: 109 MG/DL (ref 6–23)
BUN BLDV-MCNC: 66 MG/DL (ref 6–23)
BUN BLDV-MCNC: 73 MG/DL (ref 6–23)
BUN BLDV-MCNC: 80 MG/DL (ref 6–23)
BUN BLDV-MCNC: 85 MG/DL (ref 6–23)
BUN BLDV-MCNC: 86 MG/DL (ref 6–23)
BUN BLDV-MCNC: 86 MG/DL (ref 6–23)
BUN BLDV-MCNC: 87 MG/DL (ref 6–23)
BUN BLDV-MCNC: 95 MG/DL (ref 6–23)
BUN BLDV-MCNC: 99 MG/DL (ref 6–23)
BUN BLDV-MCNC: 99 MG/DL (ref 6–23)
C-REACTIVE PROTEIN: 4.9 MG/DL (ref 0–0.4)
CALCIUM SERPL-MCNC: 8.8 MG/DL (ref 8.6–10.2)
CALCIUM SERPL-MCNC: 8.9 MG/DL (ref 8.6–10.2)
CALCIUM SERPL-MCNC: 8.9 MG/DL (ref 8.6–10.2)
CALCIUM SERPL-MCNC: 9 MG/DL (ref 8.6–10.2)
CALCIUM SERPL-MCNC: 9.1 MG/DL (ref 8.6–10.2)
CALCIUM SERPL-MCNC: 9.2 MG/DL (ref 8.6–10.2)
CALCIUM SERPL-MCNC: 9.3 MG/DL (ref 8.6–10.2)
CALCIUM SERPL-MCNC: 9.4 MG/DL (ref 8.6–10.2)
CALCIUM SERPL-MCNC: 9.4 MG/DL (ref 8.6–10.2)
CALCIUM SERPL-MCNC: 9.5 MG/DL (ref 8.6–10.2)
CALCIUM SERPL-MCNC: 9.6 MG/DL (ref 8.6–10.2)
CALCIUM SERPL-MCNC: 9.8 MG/DL (ref 8.6–10.2)
CEA,FLUID: 1.1 NG/ML
CELL COUNT FLUID TYPE: NORMAL
CHLORIDE BLD-SCNC: 106 MMOL/L (ref 98–107)
CHLORIDE BLD-SCNC: 107 MMOL/L (ref 98–107)
CHLORIDE BLD-SCNC: 108 MMOL/L (ref 98–107)
CHLORIDE BLD-SCNC: 108 MMOL/L (ref 98–107)
CHLORIDE BLD-SCNC: 109 MMOL/L (ref 98–107)
CHLORIDE BLD-SCNC: 110 MMOL/L (ref 98–107)
CHLORIDE BLD-SCNC: 111 MMOL/L (ref 98–107)
CHLORIDE BLD-SCNC: 111 MMOL/L (ref 98–107)
CHLORIDE URINE RANDOM: 29 MMOL/L
CO2: 20 MMOL/L (ref 22–29)
CO2: 20 MMOL/L (ref 22–29)
CO2: 21 MMOL/L (ref 22–29)
CO2: 22 MMOL/L (ref 22–29)
CO2: 23 MMOL/L (ref 22–29)
COLOR FLUID: NORMAL
CREAT SERPL-MCNC: 2.1 MG/DL (ref 0.7–1.2)
CREAT SERPL-MCNC: 2.4 MG/DL (ref 0.7–1.2)
CREAT SERPL-MCNC: 2.7 MG/DL (ref 0.7–1.2)
CREAT SERPL-MCNC: 2.9 MG/DL (ref 0.7–1.2)
CREAT SERPL-MCNC: 3.1 MG/DL (ref 0.7–1.2)
CREAT SERPL-MCNC: 3.1 MG/DL (ref 0.7–1.2)
CREAT SERPL-MCNC: 3.3 MG/DL (ref 0.7–1.2)
CREAT SERPL-MCNC: 3.7 MG/DL (ref 0.7–1.2)
CREAT SERPL-MCNC: 3.8 MG/DL (ref 0.7–1.2)
CREAT SERPL-MCNC: 4.1 MG/DL (ref 0.7–1.2)
CREAT SERPL-MCNC: 4.2 MG/DL (ref 0.7–1.2)
CREAT SERPL-MCNC: 4.3 MG/DL (ref 0.7–1.2)
CREAT SERPL-MCNC: 4.5 MG/DL (ref 0.7–1.2)
CREAT SERPL-MCNC: 4.6 MG/DL (ref 0.7–1.2)
CREATININE URINE: 80 MG/DL (ref 40–278)
D DIMER: 533 NG/ML DDU
EKG ATRIAL RATE: 267 BPM
EKG ATRIAL RATE: 55 BPM
EKG Q-T INTERVAL: 410 MS
EKG Q-T INTERVAL: 416 MS
EKG QRS DURATION: 86 MS
EKG QRS DURATION: 88 MS
EKG QTC CALCULATION (BAZETT): 432 MS
EKG QTC CALCULATION (BAZETT): 457 MS
EKG R AXIS: 15 DEGREES
EKG R AXIS: 58 DEGREES
EKG T AXIS: 12 DEGREES
EKG T AXIS: 30 DEGREES
EKG VENTRICULAR RATE: 65 BPM
EKG VENTRICULAR RATE: 75 BPM
EOSINOPHIL, URINE: 0 % (ref 0–1)
EOSINOPHILS ABSOLUTE: 0.15 E9/L (ref 0.05–0.5)
EOSINOPHILS ABSOLUTE: 0.19 E9/L (ref 0.05–0.5)
EOSINOPHILS ABSOLUTE: 0.21 E9/L (ref 0.05–0.5)
EOSINOPHILS ABSOLUTE: 0.22 E9/L (ref 0.05–0.5)
EOSINOPHILS ABSOLUTE: 0.24 E9/L (ref 0.05–0.5)
EOSINOPHILS ABSOLUTE: 0.27 E9/L (ref 0.05–0.5)
EOSINOPHILS ABSOLUTE: 0.27 E9/L (ref 0.05–0.5)
EOSINOPHILS ABSOLUTE: 0.29 E9/L (ref 0.05–0.5)
EOSINOPHILS ABSOLUTE: 0.32 E9/L (ref 0.05–0.5)
EOSINOPHILS ABSOLUTE: 0.37 E9/L (ref 0.05–0.5)
EOSINOPHILS RELATIVE PERCENT: 2.3 % (ref 0–6)
EOSINOPHILS RELATIVE PERCENT: 2.4 % (ref 0–6)
EOSINOPHILS RELATIVE PERCENT: 3.9 % (ref 0–6)
EOSINOPHILS RELATIVE PERCENT: 4 % (ref 0–6)
EOSINOPHILS RELATIVE PERCENT: 4.1 % (ref 0–6)
EOSINOPHILS RELATIVE PERCENT: 4.1 % (ref 0–6)
EOSINOPHILS RELATIVE PERCENT: 5.3 % (ref 0–6)
EOSINOPHILS RELATIVE PERCENT: 5.4 % (ref 0–6)
EOSINOPHILS RELATIVE PERCENT: 5.4 % (ref 0–6)
EOSINOPHILS RELATIVE PERCENT: 5.6 % (ref 0–6)
FERRITIN: 168 NG/ML
FERRITIN: 208 NG/ML
FLUID TYPE: NORMAL
FUNGUS (MYCOLOGY) CULTURE: NORMAL
FUNGUS STAIN: NORMAL
GFR AFRICAN AMERICAN: 15
GFR AFRICAN AMERICAN: 15
GFR AFRICAN AMERICAN: 16
GFR AFRICAN AMERICAN: 17
GFR AFRICAN AMERICAN: 17
GFR AFRICAN AMERICAN: 19
GFR AFRICAN AMERICAN: 19
GFR AFRICAN AMERICAN: 22
GFR AFRICAN AMERICAN: 23
GFR AFRICAN AMERICAN: 23
GFR AFRICAN AMERICAN: 25
GFR AFRICAN AMERICAN: 27
GFR AFRICAN AMERICAN: 31
GFR AFRICAN AMERICAN: 37
GFR NON-AFRICAN AMERICAN: 12 ML/MIN/1.73
GFR NON-AFRICAN AMERICAN: 13 ML/MIN/1.73
GFR NON-AFRICAN AMERICAN: 13 ML/MIN/1.73
GFR NON-AFRICAN AMERICAN: 14 ML/MIN/1.73
GFR NON-AFRICAN AMERICAN: 14 ML/MIN/1.73
GFR NON-AFRICAN AMERICAN: 15 ML/MIN/1.73
GFR NON-AFRICAN AMERICAN: 16 ML/MIN/1.73
GFR NON-AFRICAN AMERICAN: 18 ML/MIN/1.73
GFR NON-AFRICAN AMERICAN: 19 ML/MIN/1.73
GFR NON-AFRICAN AMERICAN: 19 ML/MIN/1.73
GFR NON-AFRICAN AMERICAN: 21 ML/MIN/1.73
GFR NON-AFRICAN AMERICAN: 23 ML/MIN/1.73
GFR NON-AFRICAN AMERICAN: 26 ML/MIN/1.73
GFR NON-AFRICAN AMERICAN: 30 ML/MIN/1.73
GLUCOSE BLD-MCNC: 101 MG/DL (ref 74–99)
GLUCOSE BLD-MCNC: 101 MG/DL (ref 74–99)
GLUCOSE BLD-MCNC: 104 MG/DL (ref 74–99)
GLUCOSE BLD-MCNC: 106 MG/DL (ref 74–99)
GLUCOSE BLD-MCNC: 108 MG/DL (ref 74–99)
GLUCOSE BLD-MCNC: 113 MG/DL (ref 74–99)
GLUCOSE BLD-MCNC: 115 MG/DL (ref 74–99)
GLUCOSE BLD-MCNC: 134 MG/DL (ref 74–99)
GLUCOSE BLD-MCNC: 68 MG/DL (ref 74–99)
GLUCOSE BLD-MCNC: 86 MG/DL (ref 74–99)
GLUCOSE BLD-MCNC: 92 MG/DL (ref 74–99)
GLUCOSE BLD-MCNC: 92 MG/DL (ref 74–99)
GLUCOSE BLD-MCNC: 94 MG/DL (ref 74–99)
GLUCOSE BLD-MCNC: 97 MG/DL (ref 74–99)
GLUCOSE, FLUID: 106 MG/DL
GRAM STAIN ORDERABLE: NORMAL
GRAM STAIN RESULT: NORMAL
HAV IGM SER IA-ACNC: NORMAL
HCT VFR BLD CALC: 26.6 % (ref 37–54)
HCT VFR BLD CALC: 26.8 % (ref 37–54)
HCT VFR BLD CALC: 26.9 % (ref 37–54)
HCT VFR BLD CALC: 27 % (ref 37–54)
HCT VFR BLD CALC: 27.2 % (ref 37–54)
HCT VFR BLD CALC: 27.7 % (ref 37–54)
HCT VFR BLD CALC: 27.8 % (ref 37–54)
HCT VFR BLD CALC: 28.9 % (ref 37–54)
HCT VFR BLD CALC: 30.7 % (ref 37–54)
HCT VFR BLD CALC: 31.1 % (ref 37–54)
HCT VFR BLD CALC: 31.4 % (ref 37–54)
HEMOGLOBIN: 10 G/DL (ref 12.5–16.5)
HEMOGLOBIN: 10 G/DL (ref 12.5–16.5)
HEMOGLOBIN: 8.4 G/DL (ref 12.5–16.5)
HEMOGLOBIN: 8.7 G/DL (ref 12.5–16.5)
HEMOGLOBIN: 8.8 G/DL (ref 12.5–16.5)
HEMOGLOBIN: 8.9 G/DL (ref 12.5–16.5)
HEMOGLOBIN: 9 G/DL (ref 12.5–16.5)
HEMOGLOBIN: 9.2 G/DL (ref 12.5–16.5)
HEMOGLOBIN: 9.8 G/DL (ref 12.5–16.5)
HEPATITIS B CORE IGM ANTIBODY: NORMAL
HEPATITIS B SURFACE ANTIGEN INTERPRETATION: NORMAL
HEPATITIS C ANTIBODY INTERPRETATION: NORMAL
IMMATURE GRANULOCYTES #: 0.01 E9/L
IMMATURE GRANULOCYTES #: 0.02 E9/L
IMMATURE GRANULOCYTES #: 0.04 E9/L
IMMATURE GRANULOCYTES %: 0.2 % (ref 0–5)
IMMATURE GRANULOCYTES %: 0.3 % (ref 0–5)
IMMATURE GRANULOCYTES %: 0.4 % (ref 0–5)
INR BLD: 1.2
INR BLD: 1.3
IRON SATURATION: 16 % (ref 20–55)
IRON SATURATION: 32 % (ref 20–55)
IRON: 32 MCG/DL (ref 59–158)
IRON: 81 MCG/DL (ref 59–158)
LACTIC ACID: 0.9 MMOL/L (ref 0.5–2.2)
LACTIC ACID: 1 MMOL/L (ref 0.5–2.2)
LD, FLUID: 85 U/L
LIPASE BODY FLUID: 20 U/L
LV EF: 45 %
LVEF MODALITY: NORMAL
LYMPHOCYTES ABSOLUTE: 0.86 E9/L (ref 1.5–4)
LYMPHOCYTES ABSOLUTE: 1.03 E9/L (ref 1.5–4)
LYMPHOCYTES ABSOLUTE: 1.04 E9/L (ref 1.5–4)
LYMPHOCYTES ABSOLUTE: 1.06 E9/L (ref 1.5–4)
LYMPHOCYTES ABSOLUTE: 1.16 E9/L (ref 1.5–4)
LYMPHOCYTES ABSOLUTE: 1.18 E9/L (ref 1.5–4)
LYMPHOCYTES ABSOLUTE: 1.19 E9/L (ref 1.5–4)
LYMPHOCYTES ABSOLUTE: 1.4 E9/L (ref 1.5–4)
LYMPHOCYTES ABSOLUTE: 1.62 E9/L (ref 1.5–4)
LYMPHOCYTES ABSOLUTE: 1.82 E9/L (ref 1.5–4)
LYMPHOCYTES RELATIVE PERCENT: 16.4 % (ref 20–42)
LYMPHOCYTES RELATIVE PERCENT: 18.5 % (ref 20–42)
LYMPHOCYTES RELATIVE PERCENT: 18.8 % (ref 20–42)
LYMPHOCYTES RELATIVE PERCENT: 19.1 % (ref 20–42)
LYMPHOCYTES RELATIVE PERCENT: 19.5 % (ref 20–42)
LYMPHOCYTES RELATIVE PERCENT: 19.9 % (ref 20–42)
LYMPHOCYTES RELATIVE PERCENT: 21 % (ref 20–42)
LYMPHOCYTES RELATIVE PERCENT: 21.5 % (ref 20–42)
LYMPHOCYTES RELATIVE PERCENT: 22 % (ref 20–42)
LYMPHOCYTES RELATIVE PERCENT: 23.6 % (ref 20–42)
MAGNESIUM: 2.2 MG/DL (ref 1.6–2.6)
MAGNESIUM: 2.3 MG/DL (ref 1.6–2.6)
MCH RBC QN AUTO: 33.1 PG (ref 26–35)
MCH RBC QN AUTO: 33.1 PG (ref 26–35)
MCH RBC QN AUTO: 33.4 PG (ref 26–35)
MCH RBC QN AUTO: 33.6 PG (ref 26–35)
MCH RBC QN AUTO: 33.8 PG (ref 26–35)
MCH RBC QN AUTO: 33.9 PG (ref 26–35)
MCH RBC QN AUTO: 34.1 PG (ref 26–35)
MCH RBC QN AUTO: 34.4 PG (ref 26–35)
MCH RBC QN AUTO: 34.9 PG (ref 26–35)
MCH RBC QN AUTO: 34.9 PG (ref 26–35)
MCH RBC QN AUTO: 35 PG (ref 26–35)
MCHC RBC AUTO-ENTMCNC: 31.2 % (ref 32–34.5)
MCHC RBC AUTO-ENTMCNC: 31.6 % (ref 32–34.5)
MCHC RBC AUTO-ENTMCNC: 31.7 % (ref 32–34.5)
MCHC RBC AUTO-ENTMCNC: 31.8 % (ref 32–34.5)
MCHC RBC AUTO-ENTMCNC: 32.1 % (ref 32–34.5)
MCHC RBC AUTO-ENTMCNC: 32.2 % (ref 32–34.5)
MCHC RBC AUTO-ENTMCNC: 32.5 % (ref 32–34.5)
MCHC RBC AUTO-ENTMCNC: 32.6 % (ref 32–34.5)
MCHC RBC AUTO-ENTMCNC: 32.7 % (ref 32–34.5)
MCHC RBC AUTO-ENTMCNC: 33.1 % (ref 32–34.5)
MCHC RBC AUTO-ENTMCNC: 33.3 % (ref 32–34.5)
MCV RBC AUTO: 103 FL (ref 80–99.9)
MCV RBC AUTO: 104 FL (ref 80–99.9)
MCV RBC AUTO: 104.7 FL (ref 80–99.9)
MCV RBC AUTO: 105 FL (ref 80–99.9)
MCV RBC AUTO: 105.1 FL (ref 80–99.9)
MCV RBC AUTO: 105.5 FL (ref 80–99.9)
MCV RBC AUTO: 106.1 FL (ref 80–99.9)
MCV RBC AUTO: 106.1 FL (ref 80–99.9)
MCV RBC AUTO: 106.6 FL (ref 80–99.9)
MCV RBC AUTO: 106.9 FL (ref 80–99.9)
MCV RBC AUTO: 107.6 FL (ref 80–99.9)
MONOCYTE, FLUID: 86 %
MONOCYTES ABSOLUTE: 0.44 E9/L (ref 0.1–0.95)
MONOCYTES ABSOLUTE: 0.46 E9/L (ref 0.1–0.95)
MONOCYTES ABSOLUTE: 0.48 E9/L (ref 0.1–0.95)
MONOCYTES ABSOLUTE: 0.5 E9/L (ref 0.1–0.95)
MONOCYTES ABSOLUTE: 0.59 E9/L (ref 0.1–0.95)
MONOCYTES ABSOLUTE: 0.59 E9/L (ref 0.1–0.95)
MONOCYTES ABSOLUTE: 0.62 E9/L (ref 0.1–0.95)
MONOCYTES ABSOLUTE: 0.63 E9/L (ref 0.1–0.95)
MONOCYTES ABSOLUTE: 0.74 E9/L (ref 0.1–0.95)
MONOCYTES ABSOLUTE: 0.86 E9/L (ref 0.1–0.95)
MONOCYTES RELATIVE PERCENT: 10.3 % (ref 2–12)
MONOCYTES RELATIVE PERCENT: 10.8 % (ref 2–12)
MONOCYTES RELATIVE PERCENT: 10.8 % (ref 2–12)
MONOCYTES RELATIVE PERCENT: 8.1 % (ref 2–12)
MONOCYTES RELATIVE PERCENT: 8.4 % (ref 2–12)
MONOCYTES RELATIVE PERCENT: 9.2 % (ref 2–12)
MONOCYTES RELATIVE PERCENT: 9.4 % (ref 2–12)
MONOCYTES RELATIVE PERCENT: 9.5 % (ref 2–12)
MONOCYTES RELATIVE PERCENT: 9.5 % (ref 2–12)
MONOCYTES RELATIVE PERCENT: 9.8 % (ref 2–12)
NEUTROPHIL, FLUID: 14 %
NEUTROPHILS ABSOLUTE: 2.98 E9/L (ref 1.8–7.3)
NEUTROPHILS ABSOLUTE: 3.08 E9/L (ref 1.8–7.3)
NEUTROPHILS ABSOLUTE: 3.53 E9/L (ref 1.8–7.3)
NEUTROPHILS ABSOLUTE: 3.54 E9/L (ref 1.8–7.3)
NEUTROPHILS ABSOLUTE: 3.89 E9/L (ref 1.8–7.3)
NEUTROPHILS ABSOLUTE: 4.06 E9/L (ref 1.8–7.3)
NEUTROPHILS ABSOLUTE: 4.15 E9/L (ref 1.8–7.3)
NEUTROPHILS ABSOLUTE: 4.33 E9/L (ref 1.8–7.3)
NEUTROPHILS ABSOLUTE: 4.48 E9/L (ref 1.8–7.3)
NEUTROPHILS ABSOLUTE: 6.36 E9/L (ref 1.8–7.3)
NEUTROPHILS RELATIVE PERCENT: 59.2 % (ref 43–80)
NEUTROPHILS RELATIVE PERCENT: 61.9 % (ref 43–80)
NEUTROPHILS RELATIVE PERCENT: 64.3 % (ref 43–80)
NEUTROPHILS RELATIVE PERCENT: 64.9 % (ref 43–80)
NEUTROPHILS RELATIVE PERCENT: 65.5 % (ref 43–80)
NEUTROPHILS RELATIVE PERCENT: 65.7 % (ref 43–80)
NEUTROPHILS RELATIVE PERCENT: 66.5 % (ref 43–80)
NEUTROPHILS RELATIVE PERCENT: 66.7 % (ref 43–80)
NEUTROPHILS RELATIVE PERCENT: 68.2 % (ref 43–80)
NEUTROPHILS RELATIVE PERCENT: 70.7 % (ref 43–80)
NUCLEATED CELLS FLUID: 245 /UL
OSMOLALITY URINE: 425 MOSM/KG (ref 300–900)
PDW BLD-RTO: 15.9 FL (ref 11.5–15)
PDW BLD-RTO: 16.1 FL (ref 11.5–15)
PDW BLD-RTO: 16.2 FL (ref 11.5–15)
PDW BLD-RTO: 16.2 FL (ref 11.5–15)
PDW BLD-RTO: 17.8 FL (ref 11.5–15)
PDW BLD-RTO: 18.4 FL (ref 11.5–15)
PHOSPHORUS: 3.9 MG/DL (ref 2.5–4.5)
PHOSPHORUS: 4.1 MG/DL (ref 2.5–4.5)
PHOSPHORUS: 4.4 MG/DL (ref 2.5–4.5)
PLATELET # BLD: 116 E9/L (ref 130–450)
PLATELET # BLD: 125 E9/L (ref 130–450)
PLATELET # BLD: 128 E9/L (ref 130–450)
PLATELET # BLD: 145 E9/L (ref 130–450)
PLATELET # BLD: 150 E9/L (ref 130–450)
PLATELET # BLD: 151 E9/L (ref 130–450)
PLATELET # BLD: 151 E9/L (ref 130–450)
PLATELET # BLD: 154 E9/L (ref 130–450)
PLATELET # BLD: 156 E9/L (ref 130–450)
PLATELET # BLD: 162 E9/L (ref 130–450)
PLATELET # BLD: 172 E9/L (ref 130–450)
PMV BLD AUTO: 11 FL (ref 7–12)
PMV BLD AUTO: 11.4 FL (ref 7–12)
PMV BLD AUTO: 11.6 FL (ref 7–12)
PMV BLD AUTO: 11.9 FL (ref 7–12)
PMV BLD AUTO: 11.9 FL (ref 7–12)
PMV BLD AUTO: 12.1 FL (ref 7–12)
PMV BLD AUTO: 12.3 FL (ref 7–12)
PMV BLD AUTO: 12.4 FL (ref 7–12)
PMV BLD AUTO: 12.5 FL (ref 7–12)
PMV BLD AUTO: 12.7 FL (ref 7–12)
PMV BLD AUTO: 14.1 FL (ref 7–12)
POTASSIUM REFLEX MAGNESIUM: 3.8 MMOL/L (ref 3.5–5)
POTASSIUM REFLEX MAGNESIUM: 4.4 MMOL/L (ref 3.5–5)
POTASSIUM REFLEX MAGNESIUM: 4.4 MMOL/L (ref 3.5–5)
POTASSIUM REFLEX MAGNESIUM: 4.6 MMOL/L (ref 3.5–5)
POTASSIUM REFLEX MAGNESIUM: 5.1 MMOL/L (ref 3.5–5)
POTASSIUM REFLEX MAGNESIUM: 5.1 MMOL/L (ref 3.5–5)
POTASSIUM REFLEX MAGNESIUM: 5.8 MMOL/L (ref 3.5–5)
POTASSIUM SERPL-SCNC: 3.6 MMOL/L (ref 3.5–5)
POTASSIUM SERPL-SCNC: 5.1 MMOL/L (ref 3.5–5)
POTASSIUM SERPL-SCNC: 5.3 MMOL/L (ref 3.5–5)
POTASSIUM SERPL-SCNC: 5.5 MMOL/L (ref 3.5–5)
POTASSIUM SERPL-SCNC: 5.6 MMOL/L (ref 3.5–5)
POTASSIUM SERPL-SCNC: 5.7 MMOL/L (ref 3.5–5)
POTASSIUM SERPL-SCNC: 6 MMOL/L (ref 3.5–5)
POTASSIUM SERPL-SCNC: 6 MMOL/L (ref 3.5–5)
POTASSIUM SERPL-SCNC: 6.5 MMOL/L (ref 3.5–5)
POTASSIUM, UR: 34.6 MMOL/L
PRO-BNP: ABNORMAL PG/ML (ref 0–450)
PROTEIN FLUID: 2.9 G/DL
PROTEIN PROTEIN: 24 MG/DL (ref 0–12)
PROTEIN/CREAT RATIO: 0.3
PROTEIN/CREAT RATIO: 0.3 (ref 0–0.2)
PROTHROMBIN TIME: 13.4 SEC (ref 9.3–12.4)
PROTHROMBIN TIME: 13.6 SEC (ref 9.3–12.4)
RBC # BLD: 2.49 E12/L (ref 3.8–5.8)
RBC # BLD: 2.54 E12/L (ref 3.8–5.8)
RBC # BLD: 2.55 E12/L (ref 3.8–5.8)
RBC # BLD: 2.57 E12/L (ref 3.8–5.8)
RBC # BLD: 2.59 E12/L (ref 3.8–5.8)
RBC # BLD: 2.6 E12/L (ref 3.8–5.8)
RBC # BLD: 2.61 E12/L (ref 3.8–5.8)
RBC # BLD: 2.71 E12/L (ref 3.8–5.8)
RBC # BLD: 2.96 E12/L (ref 3.8–5.8)
RBC # BLD: 2.98 E12/L (ref 3.8–5.8)
RBC # BLD: 2.99 E12/L (ref 3.8–5.8)
RBC FLUID: 2000 /UL
REASON FOR REJECTION: NORMAL
REJECTED TEST: NORMAL
SEDIMENTATION RATE, ERYTHROCYTE: 30 MM/HR (ref 0–15)
SODIUM BLD-SCNC: 140 MMOL/L (ref 132–146)
SODIUM BLD-SCNC: 141 MMOL/L (ref 132–146)
SODIUM BLD-SCNC: 142 MMOL/L (ref 132–146)
SODIUM BLD-SCNC: 143 MMOL/L (ref 132–146)
SODIUM BLD-SCNC: 144 MMOL/L (ref 132–146)
SODIUM URINE: 46 MMOL/L
TOTAL IRON BINDING CAPACITY: 203 MCG/DL (ref 250–450)
TOTAL IRON BINDING CAPACITY: 250 MCG/DL (ref 250–450)
TOTAL PROTEIN: 5.8 G/DL (ref 6.4–8.3)
TOTAL PROTEIN: 5.9 G/DL (ref 6.4–8.3)
TOTAL PROTEIN: 6.1 G/DL (ref 6.4–8.3)
TOTAL PROTEIN: 6.1 G/DL (ref 6.4–8.3)
TOTAL PROTEIN: 6.3 G/DL (ref 6.4–8.3)
TOTAL PROTEIN: 6.6 G/DL (ref 6.4–8.3)
TOTAL PROTEIN: 7 G/DL (ref 6.4–8.3)
TOTAL PROTEIN: 7 G/DL (ref 6.4–8.3)
TRIGLYCERIDES FLUID: 33 MG/DL
TROPONIN, HIGH SENSITIVITY: 67 NG/L (ref 0–11)
WBC # BLD: 4.6 E9/L (ref 4.5–11.5)
WBC # BLD: 4.8 E9/L (ref 4.5–11.5)
WBC # BLD: 5.4 E9/L (ref 4.5–11.5)
WBC # BLD: 5.5 E9/L (ref 4.5–11.5)
WBC # BLD: 5.9 E9/L (ref 4.5–11.5)
WBC # BLD: 6.2 E9/L (ref 4.5–11.5)
WBC # BLD: 6.3 E9/L (ref 4.5–11.5)
WBC # BLD: 6.6 E9/L (ref 4.5–11.5)
WBC # BLD: 6.7 E9/L (ref 4.5–11.5)
WBC # BLD: 6.9 E9/L (ref 4.5–11.5)
WBC # BLD: 9.3 E9/L (ref 4.5–11.5)

## 2021-01-01 PROCEDURE — 99284 EMERGENCY DEPT VISIT MOD MDM: CPT

## 2021-01-01 PROCEDURE — 97530 THERAPEUTIC ACTIVITIES: CPT

## 2021-01-01 PROCEDURE — 74176 CT ABD & PELVIS W/O CONTRAST: CPT

## 2021-01-01 PROCEDURE — 6370000000 HC RX 637 (ALT 250 FOR IP): Performed by: INTERNAL MEDICINE

## 2021-01-01 PROCEDURE — 7100000000 HC PACU RECOVERY - FIRST 15 MIN: Performed by: SURGERY

## 2021-01-01 PROCEDURE — 85651 RBC SED RATE NONAUTOMATED: CPT

## 2021-01-01 PROCEDURE — 80048 BASIC METABOLIC PNL TOTAL CA: CPT

## 2021-01-01 PROCEDURE — 2580000003 HC RX 258: Performed by: INTERNAL MEDICINE

## 2021-01-01 PROCEDURE — 1123F ACP DISCUSS/DSCN MKR DOCD: CPT | Performed by: NURSE PRACTITIONER

## 2021-01-01 PROCEDURE — 2060000000 HC ICU INTERMEDIATE R&B

## 2021-01-01 PROCEDURE — 86140 C-REACTIVE PROTEIN: CPT

## 2021-01-01 PROCEDURE — 6370000000 HC RX 637 (ALT 250 FOR IP): Performed by: EMERGENCY MEDICINE

## 2021-01-01 PROCEDURE — 83605 ASSAY OF LACTIC ACID: CPT

## 2021-01-01 PROCEDURE — 94640 AIRWAY INHALATION TREATMENT: CPT

## 2021-01-01 PROCEDURE — 83540 ASSAY OF IRON: CPT

## 2021-01-01 PROCEDURE — 85025 COMPLETE CBC W/AUTO DIFF WBC: CPT

## 2021-01-01 PROCEDURE — 36415 COLL VENOUS BLD VENIPUNCTURE: CPT

## 2021-01-01 PROCEDURE — 6360000002 HC RX W HCPCS: Performed by: FAMILY MEDICINE

## 2021-01-01 PROCEDURE — 84478 ASSAY OF TRIGLYCERIDES: CPT

## 2021-01-01 PROCEDURE — 97535 SELF CARE MNGMENT TRAINING: CPT

## 2021-01-01 PROCEDURE — 3600000002 HC SURGERY LEVEL 2 BASE: Performed by: SURGERY

## 2021-01-01 PROCEDURE — 87205 SMEAR GRAM STAIN: CPT

## 2021-01-01 PROCEDURE — 97165 OT EVAL LOW COMPLEX 30 MIN: CPT

## 2021-01-01 PROCEDURE — 89051 BODY FLUID CELL COUNT: CPT

## 2021-01-01 PROCEDURE — 82378 CARCINOEMBRYONIC ANTIGEN: CPT

## 2021-01-01 PROCEDURE — 2500000003 HC RX 250 WO HCPCS: Performed by: INTERNAL MEDICINE

## 2021-01-01 PROCEDURE — 82947 ASSAY GLUCOSE BLOOD QUANT: CPT

## 2021-01-01 PROCEDURE — 4040F PNEUMOC VAC/ADMIN/RCVD: CPT | Performed by: NURSE PRACTITIONER

## 2021-01-01 PROCEDURE — 80053 COMPREHEN METABOLIC PANEL: CPT

## 2021-01-01 PROCEDURE — 99212 OFFICE O/P EST SF 10 MIN: CPT | Performed by: NURSE PRACTITIONER

## 2021-01-01 PROCEDURE — 93306 TTE W/DOPPLER COMPLETE: CPT

## 2021-01-01 PROCEDURE — 6360000002 HC RX W HCPCS: Performed by: STUDENT IN AN ORGANIZED HEALTH CARE EDUCATION/TRAINING PROGRAM

## 2021-01-01 PROCEDURE — 3600000012 HC SURGERY LEVEL 2 ADDTL 15MIN: Performed by: SURGERY

## 2021-01-01 PROCEDURE — 96374 THER/PROPH/DIAG INJ IV PUSH: CPT

## 2021-01-01 PROCEDURE — 84100 ASSAY OF PHOSPHORUS: CPT

## 2021-01-01 PROCEDURE — 82570 ASSAY OF URINE CREATININE: CPT

## 2021-01-01 PROCEDURE — 83880 ASSAY OF NATRIURETIC PEPTIDE: CPT

## 2021-01-01 PROCEDURE — 93005 ELECTROCARDIOGRAM TRACING: CPT | Performed by: FAMILY MEDICINE

## 2021-01-01 PROCEDURE — 2580000003 HC RX 258: Performed by: SURGERY

## 2021-01-01 PROCEDURE — 80074 ACUTE HEPATITIS PANEL: CPT

## 2021-01-01 PROCEDURE — 84484 ASSAY OF TROPONIN QUANT: CPT

## 2021-01-01 PROCEDURE — 76705 ECHO EXAM OF ABDOMEN: CPT

## 2021-01-01 PROCEDURE — 6360000002 HC RX W HCPCS: Performed by: INTERNAL MEDICINE

## 2021-01-01 PROCEDURE — 76770 US EXAM ABDO BACK WALL COMP: CPT

## 2021-01-01 PROCEDURE — G8484 FLU IMMUNIZE NO ADMIN: HCPCS | Performed by: NURSE PRACTITIONER

## 2021-01-01 PROCEDURE — G8417 CALC BMI ABV UP PARAM F/U: HCPCS | Performed by: NURSE PRACTITIONER

## 2021-01-01 PROCEDURE — 83550 IRON BINDING TEST: CPT

## 2021-01-01 PROCEDURE — 93970 EXTREMITY STUDY: CPT

## 2021-01-01 PROCEDURE — 0W9G3ZX DRAINAGE OF PERITONEAL CAVITY, PERCUTANEOUS APPROACH, DIAGNOSTIC: ICD-10-PCS | Performed by: SURGERY

## 2021-01-01 PROCEDURE — 84157 ASSAY OF PROTEIN OTHER: CPT

## 2021-01-01 PROCEDURE — 85610 PROTHROMBIN TIME: CPT

## 2021-01-01 PROCEDURE — 82042 OTHER SOURCE ALBUMIN QUAN EA: CPT

## 2021-01-01 PROCEDURE — 84300 ASSAY OF URINE SODIUM: CPT

## 2021-01-01 PROCEDURE — 99285 EMERGENCY DEPT VISIT HI MDM: CPT

## 2021-01-01 PROCEDURE — 94664 DEMO&/EVAL PT USE INHALER: CPT

## 2021-01-01 PROCEDURE — 1036F TOBACCO NON-USER: CPT | Performed by: NURSE PRACTITIONER

## 2021-01-01 PROCEDURE — 71045 X-RAY EXAM CHEST 1 VIEW: CPT

## 2021-01-01 PROCEDURE — 85378 FIBRIN DEGRADE SEMIQUANT: CPT

## 2021-01-01 PROCEDURE — C1729 CATH, DRAINAGE: HCPCS | Performed by: SURGERY

## 2021-01-01 PROCEDURE — 85027 COMPLETE CBC AUTOMATED: CPT

## 2021-01-01 PROCEDURE — 84132 ASSAY OF SERUM POTASSIUM: CPT

## 2021-01-01 PROCEDURE — 93010 ELECTROCARDIOGRAM REPORT: CPT | Performed by: INTERNAL MEDICINE

## 2021-01-01 PROCEDURE — 88305 TISSUE EXAM BY PATHOLOGIST: CPT

## 2021-01-01 PROCEDURE — 84133 ASSAY OF URINE POTASSIUM: CPT

## 2021-01-01 PROCEDURE — 83615 LACTATE (LD) (LDH) ENZYME: CPT

## 2021-01-01 PROCEDURE — G8427 DOCREV CUR MEDS BY ELIG CLIN: HCPCS | Performed by: NURSE PRACTITIONER

## 2021-01-01 PROCEDURE — 83735 ASSAY OF MAGNESIUM: CPT

## 2021-01-01 PROCEDURE — 2500000003 HC RX 250 WO HCPCS: Performed by: SURGERY

## 2021-01-01 PROCEDURE — 7100000001 HC PACU RECOVERY - ADDTL 15 MIN: Performed by: SURGERY

## 2021-01-01 PROCEDURE — 86255 FLUORESCENT ANTIBODY SCREEN: CPT

## 2021-01-01 PROCEDURE — 0W9G3ZZ DRAINAGE OF PERITONEAL CAVITY, PERCUTANEOUS APPROACH: ICD-10-PCS | Performed by: STUDENT IN AN ORGANIZED HEALTH CARE EDUCATION/TRAINING PROGRAM

## 2021-01-01 PROCEDURE — 82728 ASSAY OF FERRITIN: CPT

## 2021-01-01 PROCEDURE — 3700000001 HC ADD 15 MINUTES (ANESTHESIA): Performed by: SURGERY

## 2021-01-01 PROCEDURE — 2709999900 US GUIDED PARACENTESIS

## 2021-01-01 PROCEDURE — 84156 ASSAY OF PROTEIN URINE: CPT

## 2021-01-01 PROCEDURE — 2709999900 HC NON-CHARGEABLE SUPPLY: Performed by: SURGERY

## 2021-01-01 PROCEDURE — 82436 ASSAY OF URINE CHLORIDE: CPT

## 2021-01-01 PROCEDURE — 82247 BILIRUBIN TOTAL: CPT

## 2021-01-01 PROCEDURE — 82150 ASSAY OF AMYLASE: CPT

## 2021-01-01 PROCEDURE — 88112 CYTOPATH CELL ENHANCE TECH: CPT

## 2021-01-01 PROCEDURE — 93880 EXTRACRANIAL BILAT STUDY: CPT

## 2021-01-01 PROCEDURE — P9047 ALBUMIN (HUMAN), 25%, 50ML: HCPCS | Performed by: STUDENT IN AN ORGANIZED HEALTH CARE EDUCATION/TRAINING PROGRAM

## 2021-01-01 PROCEDURE — 3700000000 HC ANESTHESIA ATTENDED CARE: Performed by: SURGERY

## 2021-01-01 PROCEDURE — 87070 CULTURE OTHR SPECIMN AEROBIC: CPT

## 2021-01-01 PROCEDURE — 83690 ASSAY OF LIPASE: CPT

## 2021-01-01 PROCEDURE — 87102 FUNGUS ISOLATION CULTURE: CPT

## 2021-01-01 PROCEDURE — 83935 ASSAY OF URINE OSMOLALITY: CPT

## 2021-01-01 PROCEDURE — 6360000002 HC RX W HCPCS: Performed by: EMERGENCY MEDICINE

## 2021-01-01 PROCEDURE — 86038 ANTINUCLEAR ANTIBODIES: CPT

## 2021-01-01 PROCEDURE — 6360000002 HC RX W HCPCS: Performed by: NURSE ANESTHETIST, CERTIFIED REGISTERED

## 2021-01-01 PROCEDURE — 6360000002 HC RX W HCPCS: Performed by: SURGERY

## 2021-01-01 PROCEDURE — 97161 PT EVAL LOW COMPLEX 20 MIN: CPT

## 2021-01-01 RX ORDER — SODIUM CHLORIDE 0.9 % (FLUSH) 0.9 %
5-40 SYRINGE (ML) INJECTION EVERY 12 HOURS SCHEDULED
Status: DISCONTINUED | OUTPATIENT
Start: 2021-01-01 | End: 2021-01-01 | Stop reason: HOSPADM

## 2021-01-01 RX ORDER — HYDRALAZINE HYDROCHLORIDE 50 MG/1
50 TABLET, FILM COATED ORAL 3 TIMES DAILY
Status: DISCONTINUED | OUTPATIENT
Start: 2021-01-01 | End: 2021-01-01 | Stop reason: HOSPADM

## 2021-01-01 RX ORDER — PRIMIDONE 50 MG/1
100 TABLET ORAL 2 TIMES DAILY
Status: DISCONTINUED | OUTPATIENT
Start: 2021-01-01 | End: 2021-01-01 | Stop reason: HOSPADM

## 2021-01-01 RX ORDER — HYDROXYZINE HYDROCHLORIDE 10 MG/1
10 TABLET, FILM COATED ORAL NIGHTLY
Status: DISCONTINUED | OUTPATIENT
Start: 2021-01-01 | End: 2021-01-01

## 2021-01-01 RX ORDER — ACETAMINOPHEN 325 MG/1
650 TABLET ORAL EVERY 6 HOURS PRN
Status: DISCONTINUED | OUTPATIENT
Start: 2021-01-01 | End: 2021-01-01 | Stop reason: HOSPADM

## 2021-01-01 RX ORDER — HYDRALAZINE HYDROCHLORIDE 25 MG/1
75 TABLET, FILM COATED ORAL 3 TIMES DAILY
Status: DISCONTINUED | OUTPATIENT
Start: 2021-01-01 | End: 2021-01-01

## 2021-01-01 RX ORDER — IPRATROPIUM BROMIDE AND ALBUTEROL SULFATE 2.5; .5 MG/3ML; MG/3ML
1 SOLUTION RESPIRATORY (INHALATION)
Status: DISCONTINUED | OUTPATIENT
Start: 2021-01-01 | End: 2021-01-01 | Stop reason: HOSPADM

## 2021-01-01 RX ORDER — AMLODIPINE BESYLATE 5 MG/1
5 TABLET ORAL DAILY
Status: DISCONTINUED | OUTPATIENT
Start: 2021-01-01 | End: 2021-01-01

## 2021-01-01 RX ORDER — SODIUM CHLORIDE, SODIUM LACTATE, POTASSIUM CHLORIDE, CALCIUM CHLORIDE 600; 310; 30; 20 MG/100ML; MG/100ML; MG/100ML; MG/100ML
INJECTION, SOLUTION INTRAVENOUS CONTINUOUS
Status: DISCONTINUED | OUTPATIENT
Start: 2021-01-01 | End: 2021-01-01

## 2021-01-01 RX ORDER — 0.9 % SODIUM CHLORIDE 0.9 %
250 INTRAVENOUS SOLUTION INTRAVENOUS ONCE
Status: DISCONTINUED | OUTPATIENT
Start: 2021-01-01 | End: 2021-01-01 | Stop reason: HOSPADM

## 2021-01-01 RX ORDER — SENNA PLUS 8.6 MG/1
1 TABLET ORAL DAILY PRN
Status: DISCONTINUED | OUTPATIENT
Start: 2021-01-01 | End: 2021-01-01 | Stop reason: HOSPADM

## 2021-01-01 RX ORDER — LISINOPRIL 5 MG/1
5 TABLET ORAL DAILY
COMMUNITY

## 2021-01-01 RX ORDER — FUROSEMIDE 10 MG/ML
40 INJECTION INTRAMUSCULAR; INTRAVENOUS ONCE
Status: COMPLETED | OUTPATIENT
Start: 2021-01-01 | End: 2021-01-01

## 2021-01-01 RX ORDER — SODIUM BICARBONATE 650 MG/1
650 TABLET ORAL 2 TIMES DAILY
Status: DISCONTINUED | OUTPATIENT
Start: 2021-01-01 | End: 2021-01-01 | Stop reason: HOSPADM

## 2021-01-01 RX ORDER — ONDANSETRON 4 MG/1
4 TABLET, ORALLY DISINTEGRATING ORAL EVERY 8 HOURS PRN
Status: DISCONTINUED | OUTPATIENT
Start: 2021-01-01 | End: 2021-01-01 | Stop reason: HOSPADM

## 2021-01-01 RX ORDER — SODIUM CHLORIDE 0.9 % (FLUSH) 0.9 %
10 SYRINGE (ML) INJECTION EVERY 12 HOURS SCHEDULED
Status: DISCONTINUED | OUTPATIENT
Start: 2021-01-01 | End: 2021-01-01 | Stop reason: HOSPADM

## 2021-01-01 RX ORDER — BUMETANIDE 0.25 MG/ML
2 INJECTION, SOLUTION INTRAMUSCULAR; INTRAVENOUS 2 TIMES DAILY
Status: DISCONTINUED | OUTPATIENT
Start: 2021-01-01 | End: 2021-01-01

## 2021-01-01 RX ORDER — PROPOFOL 10 MG/ML
INJECTION, EMULSION INTRAVENOUS PRN
Status: DISCONTINUED | OUTPATIENT
Start: 2021-01-01 | End: 2021-01-01 | Stop reason: SDUPTHER

## 2021-01-01 RX ORDER — CHOLECALCIFEROL (VITAMIN D3) 50 MCG
2000 TABLET ORAL DAILY
Status: DISCONTINUED | OUTPATIENT
Start: 2021-01-01 | End: 2021-01-01 | Stop reason: HOSPADM

## 2021-01-01 RX ORDER — SODIUM CHLORIDE 0.9 % (FLUSH) 0.9 %
5-40 SYRINGE (ML) INJECTION PRN
Status: CANCELLED | OUTPATIENT
Start: 2021-01-01

## 2021-01-01 RX ORDER — PANTOPRAZOLE SODIUM 40 MG/1
40 TABLET, DELAYED RELEASE ORAL DAILY
COMMUNITY

## 2021-01-01 RX ORDER — SODIUM POLYSTYRENE SULFONATE 15 G/60ML
15 SUSPENSION ORAL; RECTAL ONCE
Status: COMPLETED | OUTPATIENT
Start: 2021-01-01 | End: 2021-01-01

## 2021-01-01 RX ORDER — FUROSEMIDE 40 MG/1
40 TABLET ORAL 2 TIMES DAILY
Qty: 10 TABLET | Refills: 1 | Status: SHIPPED | OUTPATIENT
Start: 2021-01-01 | End: 2021-01-01

## 2021-01-01 RX ORDER — ALBUMIN (HUMAN) 12.5 G/50ML
25 SOLUTION INTRAVENOUS ONCE
Status: COMPLETED | OUTPATIENT
Start: 2021-01-01 | End: 2021-01-01

## 2021-01-01 RX ORDER — ALLOPURINOL 300 MG/1
300 TABLET ORAL DAILY
Status: DISCONTINUED | OUTPATIENT
Start: 2021-01-01 | End: 2021-01-01 | Stop reason: HOSPADM

## 2021-01-01 RX ORDER — LIDOCAINE HYDROCHLORIDE 10 MG/ML
INJECTION, SOLUTION EPIDURAL; INFILTRATION; INTRACAUDAL; PERINEURAL PRN
Status: DISCONTINUED | OUTPATIENT
Start: 2021-01-01 | End: 2021-01-01 | Stop reason: HOSPADM

## 2021-01-01 RX ORDER — SODIUM CHLORIDE 9 MG/ML
INJECTION, SOLUTION INTRAVENOUS CONTINUOUS
Status: DISCONTINUED | OUTPATIENT
Start: 2021-01-01 | End: 2021-01-01

## 2021-01-01 RX ORDER — CALCIUM GLUCONATE 94 MG/ML
1000 INJECTION, SOLUTION INTRAVENOUS ONCE
Status: DISCONTINUED | OUTPATIENT
Start: 2021-01-01 | End: 2021-01-01 | Stop reason: SDUPTHER

## 2021-01-01 RX ORDER — METOPROLOL SUCCINATE 50 MG/1
50 TABLET, EXTENDED RELEASE ORAL DAILY
Status: DISCONTINUED | OUTPATIENT
Start: 2021-01-01 | End: 2021-01-01

## 2021-01-01 RX ORDER — SILDENAFIL CITRATE 20 MG/1
20 TABLET ORAL 3 TIMES DAILY
Status: DISCONTINUED | OUTPATIENT
Start: 2021-01-01 | End: 2021-01-01 | Stop reason: HOSPADM

## 2021-01-01 RX ORDER — SODIUM CHLORIDE, SODIUM LACTATE, POTASSIUM CHLORIDE, CALCIUM CHLORIDE 600; 310; 30; 20 MG/100ML; MG/100ML; MG/100ML; MG/100ML
INJECTION, SOLUTION INTRAVENOUS CONTINUOUS
Status: CANCELLED | OUTPATIENT
Start: 2021-01-01

## 2021-01-01 RX ORDER — HYDROXYZINE HYDROCHLORIDE 10 MG/1
10 TABLET, FILM COATED ORAL NIGHTLY
COMMUNITY

## 2021-01-01 RX ORDER — SODIUM CHLORIDE 9 MG/ML
25 INJECTION, SOLUTION INTRAVENOUS PRN
Status: DISCONTINUED | OUTPATIENT
Start: 2021-01-01 | End: 2021-01-01 | Stop reason: HOSPADM

## 2021-01-01 RX ORDER — PROPOFOL 10 MG/ML
INJECTION, EMULSION INTRAVENOUS CONTINUOUS PRN
Status: DISCONTINUED | OUTPATIENT
Start: 2021-01-01 | End: 2021-01-01 | Stop reason: SDUPTHER

## 2021-01-01 RX ORDER — LEVOTHYROXINE SODIUM 0.03 MG/1
25 TABLET ORAL DAILY
COMMUNITY

## 2021-01-01 RX ORDER — LEVOTHYROXINE SODIUM 0.03 MG/1
25 TABLET ORAL DAILY
Status: DISCONTINUED | OUTPATIENT
Start: 2021-01-01 | End: 2021-01-01 | Stop reason: HOSPADM

## 2021-01-01 RX ORDER — SODIUM CHLORIDE 0.9 % (FLUSH) 0.9 %
5-40 SYRINGE (ML) INJECTION EVERY 12 HOURS SCHEDULED
Status: CANCELLED | OUTPATIENT
Start: 2021-01-01

## 2021-01-01 RX ORDER — BUPIVACAINE HYDROCHLORIDE 5 MG/ML
INJECTION, SOLUTION EPIDURAL; INTRACAUDAL PRN
Status: DISCONTINUED | OUTPATIENT
Start: 2021-01-01 | End: 2021-01-01 | Stop reason: HOSPADM

## 2021-01-01 RX ORDER — PANTOPRAZOLE SODIUM 40 MG/1
40 TABLET, DELAYED RELEASE ORAL DAILY
Status: DISCONTINUED | OUTPATIENT
Start: 2021-01-01 | End: 2021-01-01 | Stop reason: HOSPADM

## 2021-01-01 RX ORDER — HEPARIN SODIUM 5000 [USP'U]/ML
5000 INJECTION, SOLUTION INTRAVENOUS; SUBCUTANEOUS EVERY 8 HOURS SCHEDULED
Status: DISCONTINUED | OUTPATIENT
Start: 2021-01-01 | End: 2021-01-01

## 2021-01-01 RX ORDER — MIDAZOLAM HYDROCHLORIDE 1 MG/ML
INJECTION INTRAMUSCULAR; INTRAVENOUS PRN
Status: DISCONTINUED | OUTPATIENT
Start: 2021-01-01 | End: 2021-01-01 | Stop reason: SDUPTHER

## 2021-01-01 RX ORDER — TAMSULOSIN HYDROCHLORIDE 0.4 MG/1
0.4 CAPSULE ORAL DAILY
Status: DISCONTINUED | OUTPATIENT
Start: 2021-01-01 | End: 2021-01-01 | Stop reason: HOSPADM

## 2021-01-01 RX ORDER — SODIUM CHLORIDE 0.9 % (FLUSH) 0.9 %
10 SYRINGE (ML) INJECTION PRN
Status: DISCONTINUED | OUTPATIENT
Start: 2021-01-01 | End: 2021-01-01 | Stop reason: HOSPADM

## 2021-01-01 RX ORDER — DEXTROSE MONOHYDRATE 25 G/50ML
25 INJECTION, SOLUTION INTRAVENOUS PRN
Status: DISCONTINUED | OUTPATIENT
Start: 2021-01-01 | End: 2021-01-01 | Stop reason: HOSPADM

## 2021-01-01 RX ORDER — ONDANSETRON 2 MG/ML
4 INJECTION INTRAMUSCULAR; INTRAVENOUS EVERY 6 HOURS PRN
Status: DISCONTINUED | OUTPATIENT
Start: 2021-01-01 | End: 2021-01-01 | Stop reason: HOSPADM

## 2021-01-01 RX ORDER — SODIUM CHLORIDE 0.9 % (FLUSH) 0.9 %
5-40 SYRINGE (ML) INJECTION PRN
Status: DISCONTINUED | OUTPATIENT
Start: 2021-01-01 | End: 2021-01-01 | Stop reason: HOSPADM

## 2021-01-01 RX ORDER — NEBIVOLOL 5 MG/1
10 TABLET ORAL DAILY
Status: DISCONTINUED | OUTPATIENT
Start: 2021-01-01 | End: 2021-01-01 | Stop reason: HOSPADM

## 2021-01-01 RX ORDER — AMLODIPINE BESYLATE 10 MG/1
10 TABLET ORAL DAILY
Status: DISCONTINUED | OUTPATIENT
Start: 2021-01-01 | End: 2021-01-01

## 2021-01-01 RX ADMIN — Medication 10 ML: at 08:45

## 2021-01-01 RX ADMIN — TAMSULOSIN HYDROCHLORIDE 0.4 MG: 0.4 CAPSULE ORAL at 10:31

## 2021-01-01 RX ADMIN — PROPOFOL 30 MG: 10 INJECTION, EMULSION INTRAVENOUS at 11:07

## 2021-01-01 RX ADMIN — PRIMIDONE 100 MG: 50 TABLET ORAL at 20:25

## 2021-01-01 RX ADMIN — APIXABAN 2.5 MG: 2.5 TABLET, FILM COATED ORAL at 17:33

## 2021-01-01 RX ADMIN — PANTOPRAZOLE SODIUM 40 MG: 40 TABLET, DELAYED RELEASE ORAL at 11:30

## 2021-01-01 RX ADMIN — SODIUM ZIRCONIUM CYCLOSILICATE 5 G: 5 POWDER, FOR SUSPENSION ORAL at 09:05

## 2021-01-01 RX ADMIN — PRIMIDONE 100 MG: 50 TABLET ORAL at 08:47

## 2021-01-01 RX ADMIN — SODIUM POLYSTYRENE SULFONATE 15 G: 15 SUSPENSION ORAL; RECTAL at 20:23

## 2021-01-01 RX ADMIN — HYDROXYZINE HYDROCHLORIDE 10 MG: 10 TABLET, FILM COATED ORAL at 20:36

## 2021-01-01 RX ADMIN — HYDROXYZINE HYDROCHLORIDE 10 MG: 10 TABLET, FILM COATED ORAL at 21:05

## 2021-01-01 RX ADMIN — BUMETANIDE 1 MG/HR: 0.25 INJECTION, SOLUTION INTRAMUSCULAR; INTRAVENOUS at 19:05

## 2021-01-01 RX ADMIN — SODIUM ZIRCONIUM CYCLOSILICATE 5 G: 5 POWDER, FOR SUSPENSION ORAL at 08:10

## 2021-01-01 RX ADMIN — TAMSULOSIN HYDROCHLORIDE 0.4 MG: 0.4 CAPSULE ORAL at 11:36

## 2021-01-01 RX ADMIN — BUMETANIDE 2 MG: 0.25 INJECTION INTRAMUSCULAR; INTRAVENOUS at 12:34

## 2021-01-01 RX ADMIN — TAMSULOSIN HYDROCHLORIDE 0.4 MG: 0.4 CAPSULE ORAL at 09:35

## 2021-01-01 RX ADMIN — PANTOPRAZOLE SODIUM 40 MG: 40 TABLET, DELAYED RELEASE ORAL at 09:06

## 2021-01-01 RX ADMIN — SODIUM ZIRCONIUM CYCLOSILICATE 5 G: 5 POWDER, FOR SUSPENSION ORAL at 21:48

## 2021-01-01 RX ADMIN — SILDENAFIL 20 MG: 20 TABLET ORAL at 21:07

## 2021-01-01 RX ADMIN — Medication 10 ML: at 08:48

## 2021-01-01 RX ADMIN — Medication 2000 UNITS: at 08:45

## 2021-01-01 RX ADMIN — METOPROLOL SUCCINATE 50 MG: 50 TABLET, EXTENDED RELEASE ORAL at 12:34

## 2021-01-01 RX ADMIN — HYDRALAZINE HYDROCHLORIDE 75 MG: 25 TABLET, FILM COATED ORAL at 12:34

## 2021-01-01 RX ADMIN — IPRATROPIUM BROMIDE AND ALBUTEROL SULFATE 1 AMPULE: .5; 3 SOLUTION RESPIRATORY (INHALATION) at 13:18

## 2021-01-01 RX ADMIN — Medication 2000 UNITS: at 09:07

## 2021-01-01 RX ADMIN — TAMSULOSIN HYDROCHLORIDE 0.4 MG: 0.4 CAPSULE ORAL at 09:06

## 2021-01-01 RX ADMIN — SODIUM CHLORIDE: 9 INJECTION, SOLUTION INTRAVENOUS at 18:20

## 2021-01-01 RX ADMIN — HYDRALAZINE HYDROCHLORIDE 75 MG: 50 TABLET, FILM COATED ORAL at 20:25

## 2021-01-01 RX ADMIN — SODIUM BICARBONATE 650 MG: 650 TABLET ORAL at 12:34

## 2021-01-01 RX ADMIN — PRIMIDONE 100 MG: 50 TABLET ORAL at 11:29

## 2021-01-01 RX ADMIN — HYDRALAZINE HYDROCHLORIDE 75 MG: 25 TABLET, FILM COATED ORAL at 20:36

## 2021-01-01 RX ADMIN — SODIUM CHLORIDE 125 MG: 900 INJECTION INTRAVENOUS at 17:00

## 2021-01-01 RX ADMIN — ALLOPURINOL 300 MG: 300 TABLET ORAL at 09:06

## 2021-01-01 RX ADMIN — PRIMIDONE 100 MG: 50 TABLET ORAL at 21:10

## 2021-01-01 RX ADMIN — ALBUMIN (HUMAN) 25 G: 0.25 INJECTION, SOLUTION INTRAVENOUS at 11:56

## 2021-01-01 RX ADMIN — APIXABAN 2.5 MG: 2.5 TABLET, FILM COATED ORAL at 18:23

## 2021-01-01 RX ADMIN — APIXABAN 2.5 MG: 2.5 TABLET, FILM COATED ORAL at 06:48

## 2021-01-01 RX ADMIN — FUROSEMIDE 40 MG: 10 INJECTION, SOLUTION INTRAMUSCULAR; INTRAVENOUS at 05:15

## 2021-01-01 RX ADMIN — SODIUM BICARBONATE 650 MG: 650 TABLET ORAL at 08:47

## 2021-01-01 RX ADMIN — SODIUM ZIRCONIUM CYCLOSILICATE 5 G: 5 POWDER, FOR SUSPENSION ORAL at 09:40

## 2021-01-01 RX ADMIN — HYDRALAZINE HYDROCHLORIDE 50 MG: 50 TABLET, FILM COATED ORAL at 08:10

## 2021-01-01 RX ADMIN — SODIUM BICARBONATE 650 MG: 650 TABLET ORAL at 09:36

## 2021-01-01 RX ADMIN — CHLOROTHIAZIDE SODIUM 500 MG: 500 INJECTION, POWDER, LYOPHILIZED, FOR SOLUTION INTRAVENOUS at 20:35

## 2021-01-01 RX ADMIN — Medication 10 ML: at 10:32

## 2021-01-01 RX ADMIN — Medication 10 ML: at 20:38

## 2021-01-01 RX ADMIN — IPRATROPIUM BROMIDE AND ALBUTEROL SULFATE 1 AMPULE: .5; 3 SOLUTION RESPIRATORY (INHALATION) at 19:44

## 2021-01-01 RX ADMIN — HYDRALAZINE HYDROCHLORIDE 75 MG: 50 TABLET, FILM COATED ORAL at 14:43

## 2021-01-01 RX ADMIN — HYDRALAZINE HYDROCHLORIDE 75 MG: 50 TABLET, FILM COATED ORAL at 21:10

## 2021-01-01 RX ADMIN — METOPROLOL SUCCINATE 50 MG: 50 TABLET, EXTENDED RELEASE ORAL at 10:31

## 2021-01-01 RX ADMIN — TAMSULOSIN HYDROCHLORIDE 0.4 MG: 0.4 CAPSULE ORAL at 08:45

## 2021-01-01 RX ADMIN — ALLOPURINOL 300 MG: 300 TABLET ORAL at 09:36

## 2021-01-01 RX ADMIN — SODIUM ZIRCONIUM CYCLOSILICATE 5 G: 5 POWDER, FOR SUSPENSION ORAL at 20:39

## 2021-01-01 RX ADMIN — LEVOTHYROXINE SODIUM 25 MCG: 25 TABLET ORAL at 06:10

## 2021-01-01 RX ADMIN — HYDRALAZINE HYDROCHLORIDE 75 MG: 50 TABLET, FILM COATED ORAL at 14:45

## 2021-01-01 RX ADMIN — Medication 10 ML: at 21:16

## 2021-01-01 RX ADMIN — HYDRALAZINE HYDROCHLORIDE 75 MG: 50 TABLET, FILM COATED ORAL at 08:44

## 2021-01-01 RX ADMIN — Medication 10 ML: at 21:06

## 2021-01-01 RX ADMIN — PRIMIDONE 100 MG: 50 TABLET ORAL at 10:31

## 2021-01-01 RX ADMIN — Medication 10 ML: at 21:48

## 2021-01-01 RX ADMIN — METOPROLOL SUCCINATE 50 MG: 50 TABLET, EXTENDED RELEASE ORAL at 09:36

## 2021-01-01 RX ADMIN — SODIUM ZIRCONIUM CYCLOSILICATE 5 G: 5 POWDER, FOR SUSPENSION ORAL at 11:36

## 2021-01-01 RX ADMIN — CALCIUM GLUCONATE 1000 MG: 98 INJECTION, SOLUTION INTRAVENOUS at 21:39

## 2021-01-01 RX ADMIN — IPRATROPIUM BROMIDE AND ALBUTEROL SULFATE 1 AMPULE: .5; 3 SOLUTION RESPIRATORY (INHALATION) at 08:15

## 2021-01-01 RX ADMIN — HYDRALAZINE HYDROCHLORIDE 75 MG: 50 TABLET, FILM COATED ORAL at 08:46

## 2021-01-01 RX ADMIN — SODIUM ZIRCONIUM CYCLOSILICATE 5 G: 5 POWDER, FOR SUSPENSION ORAL at 14:00

## 2021-01-01 RX ADMIN — ALLOPURINOL 300 MG: 300 TABLET ORAL at 08:53

## 2021-01-01 RX ADMIN — IPRATROPIUM BROMIDE AND ALBUTEROL SULFATE 1 AMPULE: .5; 3 SOLUTION RESPIRATORY (INHALATION) at 11:53

## 2021-01-01 RX ADMIN — PRIMIDONE 100 MG: 50 TABLET ORAL at 08:44

## 2021-01-01 RX ADMIN — BUMETANIDE 2 MG: 0.25 INJECTION INTRAMUSCULAR; INTRAVENOUS at 12:43

## 2021-01-01 RX ADMIN — HYDROXYZINE HYDROCHLORIDE 10 MG: 10 TABLET, FILM COATED ORAL at 21:22

## 2021-01-01 RX ADMIN — Medication 2000 UNITS: at 11:36

## 2021-01-01 RX ADMIN — LEVOTHYROXINE SODIUM 25 MCG: 25 TABLET ORAL at 05:37

## 2021-01-01 RX ADMIN — SODIUM ZIRCONIUM CYCLOSILICATE 5 G: 5 POWDER, FOR SUSPENSION ORAL at 13:44

## 2021-01-01 RX ADMIN — IPRATROPIUM BROMIDE AND ALBUTEROL SULFATE 1 AMPULE: .5; 3 SOLUTION RESPIRATORY (INHALATION) at 08:22

## 2021-01-01 RX ADMIN — ALLOPURINOL 300 MG: 300 TABLET ORAL at 12:35

## 2021-01-01 RX ADMIN — SODIUM BICARBONATE 650 MG: 650 TABLET ORAL at 10:31

## 2021-01-01 RX ADMIN — HYDROXYZINE HYDROCHLORIDE 10 MG: 10 TABLET, FILM COATED ORAL at 20:26

## 2021-01-01 RX ADMIN — Medication 2000 UNITS: at 08:47

## 2021-01-01 RX ADMIN — Medication 2000 UNITS: at 09:35

## 2021-01-01 RX ADMIN — HYDRALAZINE HYDROCHLORIDE 50 MG: 50 TABLET, FILM COATED ORAL at 13:43

## 2021-01-01 RX ADMIN — SODIUM BICARBONATE 650 MG: 650 TABLET ORAL at 20:26

## 2021-01-01 RX ADMIN — LEVOTHYROXINE SODIUM 25 MCG: 25 TABLET ORAL at 05:58

## 2021-01-01 RX ADMIN — SODIUM ZIRCONIUM CYCLOSILICATE 5 G: 5 POWDER, FOR SUSPENSION ORAL at 16:23

## 2021-01-01 RX ADMIN — LEVOTHYROXINE SODIUM 25 MCG: 25 TABLET ORAL at 06:48

## 2021-01-01 RX ADMIN — SODIUM BICARBONATE 650 MG: 650 TABLET ORAL at 20:36

## 2021-01-01 RX ADMIN — PRIMIDONE 100 MG: 50 TABLET ORAL at 20:39

## 2021-01-01 RX ADMIN — METOPROLOL SUCCINATE 50 MG: 50 TABLET, EXTENDED RELEASE ORAL at 08:10

## 2021-01-01 RX ADMIN — APIXABAN 2.5 MG: 2.5 TABLET, FILM COATED ORAL at 18:38

## 2021-01-01 RX ADMIN — METOPROLOL SUCCINATE 50 MG: 50 TABLET, EXTENDED RELEASE ORAL at 09:06

## 2021-01-01 RX ADMIN — SODIUM CHLORIDE 25 MG: 9 INJECTION, SOLUTION INTRAVENOUS at 21:10

## 2021-01-01 RX ADMIN — SODIUM CHLORIDE 125 MG: 900 INJECTION INTRAVENOUS at 18:38

## 2021-01-01 RX ADMIN — HYDRALAZINE HYDROCHLORIDE 50 MG: 50 TABLET, FILM COATED ORAL at 14:10

## 2021-01-01 RX ADMIN — Medication 10 ML: at 09:07

## 2021-01-01 RX ADMIN — LEVOTHYROXINE SODIUM 25 MCG: 25 TABLET ORAL at 06:01

## 2021-01-01 RX ADMIN — SODIUM ZIRCONIUM CYCLOSILICATE 5 G: 5 POWDER, FOR SUSPENSION ORAL at 21:23

## 2021-01-01 RX ADMIN — HYDROXYZINE HYDROCHLORIDE 10 MG: 10 TABLET, FILM COATED ORAL at 21:10

## 2021-01-01 RX ADMIN — HYDROXYZINE HYDROCHLORIDE 10 MG: 10 TABLET, FILM COATED ORAL at 21:07

## 2021-01-01 RX ADMIN — BUMETANIDE 1 MG/HR: 0.25 INJECTION, SOLUTION INTRAMUSCULAR; INTRAVENOUS at 08:02

## 2021-01-01 RX ADMIN — AMLODIPINE BESYLATE 5 MG: 5 TABLET ORAL at 09:06

## 2021-01-01 RX ADMIN — TAMSULOSIN HYDROCHLORIDE 0.4 MG: 0.4 CAPSULE ORAL at 08:10

## 2021-01-01 RX ADMIN — TAMSULOSIN HYDROCHLORIDE 0.4 MG: 0.4 CAPSULE ORAL at 12:35

## 2021-01-01 RX ADMIN — HYDRALAZINE HYDROCHLORIDE 75 MG: 50 TABLET, FILM COATED ORAL at 21:47

## 2021-01-01 RX ADMIN — PRIMIDONE 100 MG: 50 TABLET ORAL at 12:34

## 2021-01-01 RX ADMIN — Medication 10 ML: at 20:56

## 2021-01-01 RX ADMIN — CHLOROTHIAZIDE SODIUM 500 MG: 500 INJECTION, POWDER, LYOPHILIZED, FOR SOLUTION INTRAVENOUS at 09:05

## 2021-01-01 RX ADMIN — BUMETANIDE 2 MG: 0.25 INJECTION INTRAMUSCULAR; INTRAVENOUS at 21:47

## 2021-01-01 RX ADMIN — HYDRALAZINE HYDROCHLORIDE 75 MG: 50 TABLET, FILM COATED ORAL at 21:23

## 2021-01-01 RX ADMIN — HYDRALAZINE HYDROCHLORIDE 75 MG: 50 TABLET, FILM COATED ORAL at 21:05

## 2021-01-01 RX ADMIN — AMLODIPINE BESYLATE 10 MG: 10 TABLET ORAL at 11:30

## 2021-01-01 RX ADMIN — APIXABAN 2.5 MG: 2.5 TABLET, FILM COATED ORAL at 06:01

## 2021-01-01 RX ADMIN — SODIUM ZIRCONIUM CYCLOSILICATE 5 G: 5 POWDER, FOR SUSPENSION ORAL at 17:35

## 2021-01-01 RX ADMIN — ALLOPURINOL 300 MG: 300 TABLET ORAL at 10:32

## 2021-01-01 RX ADMIN — METOPROLOL SUCCINATE 50 MG: 50 TABLET, EXTENDED RELEASE ORAL at 11:31

## 2021-01-01 RX ADMIN — PANTOPRAZOLE SODIUM 40 MG: 40 TABLET, DELAYED RELEASE ORAL at 12:34

## 2021-01-01 RX ADMIN — SODIUM BICARBONATE 50 MEQ: 84 INJECTION, SOLUTION INTRAVENOUS at 20:28

## 2021-01-01 RX ADMIN — SODIUM BICARBONATE 650 MG: 650 TABLET ORAL at 08:45

## 2021-01-01 RX ADMIN — AMLODIPINE BESYLATE 10 MG: 10 TABLET ORAL at 09:35

## 2021-01-01 RX ADMIN — PRIMIDONE 100 MG: 50 TABLET ORAL at 21:47

## 2021-01-01 RX ADMIN — HYDRALAZINE HYDROCHLORIDE 75 MG: 50 TABLET, FILM COATED ORAL at 14:06

## 2021-01-01 RX ADMIN — SODIUM CHLORIDE, POTASSIUM CHLORIDE, SODIUM LACTATE AND CALCIUM CHLORIDE: 600; 310; 30; 20 INJECTION, SOLUTION INTRAVENOUS at 10:27

## 2021-01-01 RX ADMIN — FUROSEMIDE 40 MG: 10 INJECTION, SOLUTION INTRAMUSCULAR; INTRAVENOUS at 10:02

## 2021-01-01 RX ADMIN — Medication 10 ML: at 12:35

## 2021-01-01 RX ADMIN — SODIUM BICARBONATE 650 MG: 650 TABLET ORAL at 08:09

## 2021-01-01 RX ADMIN — SODIUM ZIRCONIUM CYCLOSILICATE 5 G: 5 POWDER, FOR SUSPENSION ORAL at 08:44

## 2021-01-01 RX ADMIN — IPRATROPIUM BROMIDE AND ALBUTEROL SULFATE 1 AMPULE: .5; 3 SOLUTION RESPIRATORY (INHALATION) at 08:41

## 2021-01-01 RX ADMIN — TAMSULOSIN HYDROCHLORIDE 0.4 MG: 0.4 CAPSULE ORAL at 08:47

## 2021-01-01 RX ADMIN — PANTOPRAZOLE SODIUM 40 MG: 40 TABLET, DELAYED RELEASE ORAL at 08:45

## 2021-01-01 RX ADMIN — SODIUM CHLORIDE: 9 INJECTION, SOLUTION INTRAVENOUS at 20:42

## 2021-01-01 RX ADMIN — Medication 2000 UNITS: at 08:11

## 2021-01-01 RX ADMIN — SODIUM ZIRCONIUM CYCLOSILICATE 5 G: 5 POWDER, FOR SUSPENSION ORAL at 20:38

## 2021-01-01 RX ADMIN — SODIUM CHLORIDE: 9 INJECTION, SOLUTION INTRAVENOUS at 16:55

## 2021-01-01 RX ADMIN — Medication 10 ML: at 21:11

## 2021-01-01 RX ADMIN — SILDENAFIL 20 MG: 20 TABLET ORAL at 08:10

## 2021-01-01 RX ADMIN — IPRATROPIUM BROMIDE AND ALBUTEROL SULFATE 1 AMPULE: .5; 3 SOLUTION RESPIRATORY (INHALATION) at 20:10

## 2021-01-01 RX ADMIN — SILDENAFIL 20 MG: 20 TABLET ORAL at 14:10

## 2021-01-01 RX ADMIN — PRIMIDONE 100 MG: 50 TABLET ORAL at 08:10

## 2021-01-01 RX ADMIN — HYDRALAZINE HYDROCHLORIDE 75 MG: 50 TABLET, FILM COATED ORAL at 13:44

## 2021-01-01 RX ADMIN — METOPROLOL SUCCINATE 50 MG: 50 TABLET, EXTENDED RELEASE ORAL at 08:47

## 2021-01-01 RX ADMIN — APIXABAN 2.5 MG: 2.5 TABLET, FILM COATED ORAL at 05:58

## 2021-01-01 RX ADMIN — SODIUM POLYSTYRENE SULFONATE 15 G: 15 SUSPENSION ORAL; RECTAL at 05:21

## 2021-01-01 RX ADMIN — SODIUM BICARBONATE 650 MG: 650 TABLET ORAL at 21:05

## 2021-01-01 RX ADMIN — SODIUM BICARBONATE 650 MG: 650 TABLET ORAL at 21:11

## 2021-01-01 RX ADMIN — METOPROLOL SUCCINATE 50 MG: 50 TABLET, EXTENDED RELEASE ORAL at 08:44

## 2021-01-01 RX ADMIN — PROPOFOL 50 MCG/KG/MIN: 10 INJECTION, EMULSION INTRAVENOUS at 11:07

## 2021-01-01 RX ADMIN — CHLOROTHIAZIDE SODIUM 500 MG: 500 INJECTION, POWDER, LYOPHILIZED, FOR SOLUTION INTRAVENOUS at 09:08

## 2021-01-01 RX ADMIN — PRIMIDONE 100 MG: 50 TABLET ORAL at 21:07

## 2021-01-01 RX ADMIN — SODIUM ZIRCONIUM CYCLOSILICATE 5 G: 5 POWDER, FOR SUSPENSION ORAL at 21:07

## 2021-01-01 RX ADMIN — HYDRALAZINE HYDROCHLORIDE 75 MG: 50 TABLET, FILM COATED ORAL at 14:55

## 2021-01-01 RX ADMIN — PANTOPRAZOLE SODIUM 40 MG: 40 TABLET, DELAYED RELEASE ORAL at 10:31

## 2021-01-01 RX ADMIN — IPRATROPIUM BROMIDE AND ALBUTEROL SULFATE 1 AMPULE: .5; 3 SOLUTION RESPIRATORY (INHALATION) at 17:13

## 2021-01-01 RX ADMIN — INSULIN HUMAN 6 UNITS: 100 INJECTION, SOLUTION PARENTERAL at 20:44

## 2021-01-01 RX ADMIN — AMLODIPINE BESYLATE 10 MG: 10 TABLET ORAL at 08:47

## 2021-01-01 RX ADMIN — SODIUM ZIRCONIUM CYCLOSILICATE 5 G: 5 POWDER, FOR SUSPENSION ORAL at 10:31

## 2021-01-01 RX ADMIN — SODIUM BICARBONATE 650 MG: 650 TABLET ORAL at 21:22

## 2021-01-01 RX ADMIN — Medication 2000 UNITS: at 10:32

## 2021-01-01 RX ADMIN — AMLODIPINE BESYLATE 5 MG: 5 TABLET ORAL at 10:32

## 2021-01-01 RX ADMIN — CALCIUM GLUCONATE 1000 MG: 98 INJECTION, SOLUTION INTRAVENOUS at 15:08

## 2021-01-01 RX ADMIN — ALLOPURINOL 300 MG: 300 TABLET ORAL at 11:31

## 2021-01-01 RX ADMIN — BUMETANIDE 2 MG: 0.25 INJECTION INTRAMUSCULAR; INTRAVENOUS at 09:05

## 2021-01-01 RX ADMIN — BUMETANIDE 2 MG: 0.25 INJECTION INTRAMUSCULAR; INTRAVENOUS at 20:35

## 2021-01-01 RX ADMIN — HYDRALAZINE HYDROCHLORIDE 75 MG: 25 TABLET, FILM COATED ORAL at 16:23

## 2021-01-01 RX ADMIN — CHLOROTHIAZIDE SODIUM 500 MG: 500 INJECTION, POWDER, LYOPHILIZED, FOR SOLUTION INTRAVENOUS at 22:09

## 2021-01-01 RX ADMIN — PRIMIDONE 100 MG: 50 TABLET ORAL at 09:40

## 2021-01-01 RX ADMIN — HYDROXYZINE HYDROCHLORIDE 10 MG: 10 TABLET, FILM COATED ORAL at 21:48

## 2021-01-01 RX ADMIN — Medication 2000 UNITS: at 12:35

## 2021-01-01 RX ADMIN — SODIUM BICARBONATE 650 MG: 650 TABLET ORAL at 21:07

## 2021-01-01 RX ADMIN — AMLODIPINE BESYLATE 5 MG: 5 TABLET ORAL at 12:34

## 2021-01-01 RX ADMIN — HYDRALAZINE HYDROCHLORIDE 75 MG: 50 TABLET, FILM COATED ORAL at 11:29

## 2021-01-01 RX ADMIN — HYDRALAZINE HYDROCHLORIDE 75 MG: 50 TABLET, FILM COATED ORAL at 20:39

## 2021-01-01 RX ADMIN — HYDRALAZINE HYDROCHLORIDE 75 MG: 50 TABLET, FILM COATED ORAL at 18:18

## 2021-01-01 RX ADMIN — Medication 2000 MG: at 10:55

## 2021-01-01 RX ADMIN — SILDENAFIL 20 MG: 20 TABLET ORAL at 15:38

## 2021-01-01 RX ADMIN — PRIMIDONE 100 MG: 50 TABLET ORAL at 21:05

## 2021-01-01 RX ADMIN — LEVOTHYROXINE SODIUM 25 MCG: 25 TABLET ORAL at 12:34

## 2021-01-01 RX ADMIN — ALLOPURINOL 300 MG: 300 TABLET ORAL at 08:11

## 2021-01-01 RX ADMIN — SODIUM BICARBONATE 650 MG: 650 TABLET ORAL at 21:47

## 2021-01-01 RX ADMIN — PANTOPRAZOLE SODIUM 40 MG: 40 TABLET, DELAYED RELEASE ORAL at 09:36

## 2021-01-01 RX ADMIN — AMLODIPINE BESYLATE 5 MG: 5 TABLET ORAL at 08:45

## 2021-01-01 RX ADMIN — HYDRALAZINE HYDROCHLORIDE 50 MG: 50 TABLET, FILM COATED ORAL at 21:07

## 2021-01-01 RX ADMIN — SODIUM ZIRCONIUM CYCLOSILICATE 5 G: 5 POWDER, FOR SUSPENSION ORAL at 14:55

## 2021-01-01 RX ADMIN — SODIUM BICARBONATE 650 MG: 650 TABLET ORAL at 11:30

## 2021-01-01 RX ADMIN — SODIUM ZIRCONIUM CYCLOSILICATE 5 G: 5 POWDER, FOR SUSPENSION ORAL at 14:10

## 2021-01-01 RX ADMIN — HYDROXYZINE HYDROCHLORIDE 10 MG: 10 TABLET, FILM COATED ORAL at 20:39

## 2021-01-01 RX ADMIN — ALLOPURINOL 300 MG: 300 TABLET ORAL at 08:45

## 2021-01-01 RX ADMIN — PANTOPRAZOLE SODIUM 40 MG: 40 TABLET, DELAYED RELEASE ORAL at 08:47

## 2021-01-01 RX ADMIN — SODIUM ZIRCONIUM CYCLOSILICATE 5 G: 5 POWDER, FOR SUSPENSION ORAL at 21:10

## 2021-01-01 RX ADMIN — MIDAZOLAM 0.5 MG: 1 INJECTION INTRAMUSCULAR; INTRAVENOUS at 11:07

## 2021-01-01 RX ADMIN — SODIUM BICARBONATE 650 MG: 650 TABLET ORAL at 09:06

## 2021-01-01 RX ADMIN — SODIUM ZIRCONIUM CYCLOSILICATE 5 G: 5 POWDER, FOR SUSPENSION ORAL at 13:43

## 2021-01-01 RX ADMIN — SODIUM ZIRCONIUM CYCLOSILICATE 5 G: 5 POWDER, FOR SUSPENSION ORAL at 12:33

## 2021-01-01 RX ADMIN — HYDRALAZINE HYDROCHLORIDE 75 MG: 25 TABLET, FILM COATED ORAL at 09:05

## 2021-01-01 RX ADMIN — Medication 10 ML: at 20:39

## 2021-01-01 RX ADMIN — PRIMIDONE 100 MG: 50 TABLET ORAL at 20:36

## 2021-01-01 RX ADMIN — PRIMIDONE 100 MG: 50 TABLET ORAL at 21:22

## 2021-01-01 RX ADMIN — HYDRALAZINE HYDROCHLORIDE 75 MG: 50 TABLET, FILM COATED ORAL at 10:31

## 2021-01-01 RX ADMIN — HYDRALAZINE HYDROCHLORIDE 75 MG: 50 TABLET, FILM COATED ORAL at 09:35

## 2021-01-01 RX ADMIN — LEVOTHYROXINE SODIUM 25 MCG: 25 TABLET ORAL at 07:41

## 2021-01-01 RX ADMIN — SODIUM ZIRCONIUM CYCLOSILICATE 5 G: 5 POWDER, FOR SUSPENSION ORAL at 21:04

## 2021-01-01 RX ADMIN — SODIUM BICARBONATE 650 MG: 650 TABLET ORAL at 20:46

## 2021-01-01 RX ADMIN — LEVOTHYROXINE SODIUM 25 MCG: 25 TABLET ORAL at 06:49

## 2021-01-01 RX ADMIN — SODIUM ZIRCONIUM CYCLOSILICATE 5 G: 5 POWDER, FOR SUSPENSION ORAL at 14:06

## 2021-01-01 RX ADMIN — SODIUM CHLORIDE 125 MG: 900 INJECTION INTRAVENOUS at 17:05

## 2021-01-01 RX ADMIN — IPRATROPIUM BROMIDE AND ALBUTEROL SULFATE 1 AMPULE: .5; 3 SOLUTION RESPIRATORY (INHALATION) at 16:04

## 2021-01-01 RX ADMIN — PRIMIDONE 100 MG: 50 TABLET ORAL at 09:06

## 2021-01-01 RX ADMIN — PANTOPRAZOLE SODIUM 40 MG: 40 TABLET, DELAYED RELEASE ORAL at 08:10

## 2021-01-01 ASSESSMENT — PULMONARY FUNCTION TESTS
PIF_VALUE: 1
PIF_VALUE: 1
PIF_VALUE: 0
PIF_VALUE: 0
PIF_VALUE: 1
PIF_VALUE: 0
PIF_VALUE: 1
PIF_VALUE: 0
PIF_VALUE: 1

## 2021-01-01 ASSESSMENT — PAIN SCALES - GENERAL
PAINLEVEL_OUTOF10: 0
PAINLEVEL_OUTOF10: 8
PAINLEVEL_OUTOF10: 0

## 2021-01-01 ASSESSMENT — PAIN - FUNCTIONAL ASSESSMENT: PAIN_FUNCTIONAL_ASSESSMENT: 0-10

## 2021-01-01 ASSESSMENT — PAIN DESCRIPTION - LOCATION
LOCATION: ABDOMEN

## 2021-01-01 ASSESSMENT — PAIN DESCRIPTION - DESCRIPTORS: DESCRIPTORS: DISCOMFORT

## 2021-01-01 ASSESSMENT — PAIN DESCRIPTION - PAIN TYPE
TYPE: SURGICAL PAIN
TYPE: ACUTE PAIN
TYPE: SURGICAL PAIN

## 2021-01-01 ASSESSMENT — PAIN DESCRIPTION - FREQUENCY: FREQUENCY: CONTINUOUS

## 2021-01-01 ASSESSMENT — ENCOUNTER SYMPTOMS: SHORTNESS OF BREATH: 1

## 2021-01-01 ASSESSMENT — PAIN DESCRIPTION - ORIENTATION: ORIENTATION: RIGHT

## 2021-03-02 NOTE — TELEPHONE ENCOUNTER
Called to confirm appointment for 3/3/21 at 1pm, left message with date, time, and phone number for patient.

## 2021-03-03 NOTE — PROGRESS NOTES
Vascular Surgery Outpatient Progress Note      Chief Complaint   Patient presents with    Circulatory Problem     bilateral carotid artery stenosis       HISTORY OF PRESENT ILLNESS:                The patient is a 78 y.o. male who returns for follow-up evaluation of carotid artery disease. He denies any symptoms of stroke, ministroke, or amaurosis fugax. He notes he had a L CEA over 10 years ago at [facility] for asymptomatic carotid disease. Denies any major changes in health, surgery, or hospitalizations since he was seen last year. Past Medical History:        Diagnosis Date    Atrial fibrillation (HCC)     CKD (chronic kidney disease) stage 3, GFR 30-59 ml/min 12/17/2014    H/O syncope     Hemorrhoids     Hyperlipidemia     Hypertension      Past Surgical History:        Procedure Laterality Date    CAROTID ENDARTERECTOMY      CARPAL TUNNEL RELEASE Bilateral     CHOLECYSTECTOMY      FINGER TRIGGER RELEASE Bilateral     KNEE SURGERY      SHOULDER SURGERY Bilateral     UPPER GASTROINTESTINAL ENDOSCOPY N/A 2/7/2019    EGD BIOPSY performed by Ricky Richardson MD at [address] [place] [address]     Current Medications:   Prior to Admission medications    Medication Sig Start Date End Date Taking?  Authorizing Provider   hydrALAZINE (APRESOLINE) 50 MG tablet Take 1.5 tablets by mouth 3 times daily 3/10/20  Yes Aracely Smith MD   magnesium hydroxide (MILK OF MAGNESIA) 400 MG/5ML suspension Take 30 mLs by mouth daily as needed for Constipation Take with full glass of water 3/7/20  Yes Dasie Gowers, MD   SODIUM BICARBONATE PO Take 650 mg by mouth 2 times daily   Yes Historical Provider, MD   furosemide (LASIX) 40 MG tablet Take 40 mg by mouth daily   Yes Historical Provider, MD   metoprolol succinate (TOPROL XL) 50 MG extended release tablet Take 1 tablet by mouth daily 11/5/19  Yes Wicho Medina DO   tamsulosin [facility]) 0.4 MG capsule Take 1 capsule by mouth daily 11/5/19  Yes Wicho Medina DO amLODIPine (NORVASC) 10 MG tablet Take 1 tablet by mouth daily 19  Yes Dotty Marley DO   vitamin D 25 MCG (1000 UT) CAPS Take 2,000 Units by mouth daily   Yes Historical Provider, MD   allopurinol (ZYLOPRIM) 300 MG tablet Take 300 mg by mouth daily   Yes Historical Provider, MD   Apixaban (ELIQUIS PO) Take 5 mg by mouth 2 times daily Patient is not on xarelto. Takes eliquis dosage unknown once daily. Receives free from [facility] through a prescription program    Yes Historical Provider, MD   ferrous sulfate 325 (65 Fe) MG tablet Take 325 mg by mouth 2 times daily   Yes Historical Provider, MD   pantoprazole (PROTONIX) 40 MG tablet Take 1 tablet by mouth every morning (before breakfast) 19  Yes Venecia Frazier MD   calcitRIOL (ROCALTROL) 0.25 MCG capsule Take 0.25 mcg by mouth daily   Yes Historical Provider, MD   atorvastatin (LIPITOR) 40 MG tablet Take 40 mg by mouth nightly  10/1/17  Yes Historical Provider, MD   primidone (MYSOLINE) 50 MG tablet Take 100 mg by mouth 2 times daily  10/25/17  Yes Historical Provider, MD     Allergies:  Patient has no known allergies. Social History     Socioeconomic History    Marital status:       Spouse name: Not on file    Number of children: Not on file    Years of education: Not on file    Highest education level: Not on file   Occupational History    Not on file   Social Needs    Financial resource strain: Not on file    Food insecurity     Worry: Not on file     Inability: Not on file    Transportation needs     Medical: Not on file     Non-medical: Not on file   Tobacco Use    Smoking status: Former Smoker     Packs/day: 0.75     Types: Cigarettes     Start date: 10/31/1949     Quit date: 1982     Years since quittin.8    Smokeless tobacco: Never Used   Substance and Sexual Activity    Alcohol use: Not Currently     Alcohol/week: 0.0 standard drinks    Drug use: Never    Sexual activity: Never   Lifestyle Duplex examination of the RIGHT carotid artery system identifies atherosclerotic plaque. The peak systolic velocity in internal carotid artery was 183 centimeters / second. The maximum end diastolic velocity was 53 centimeters / second. The ICA/CCA ratio is 2.1. The right vertebral artery has antegrade flow.     Duplex examination of the LEFT carotid artery system identifies atherosclerotic plaque. The peak systolic velocity in internal carotid artery was 355 centimeters / second. The maximum end diastolic velocity was 47 centimeters / second. The ICA/CCA ratio is 4.1. The left vertebral artery flow is not visualized.     RIGHT  psv  LEFT  psv     LAST STUDY  2/5/2020  Rt 50[address]79  L[address] 79    Problem List Items Addressed This Visit     None      Visit Diagnoses     Carotid artery stenosis, asymptomatic, bilateral    -  Primary    Relevant Orders    VL DUP CAROTID BILATERAL        I reviewed the results of the ultrasound with the patient, which is stable when compared to his previous imaging. We will see him back in 1 year for repeat US. I reviewed the natural history and treatment options of carotid artery disease. I reviewed the signs and symptoms of stroke, and instructions if symptoms occur. Pt seen and plan reviewed with Dr. Richardson Montejo. Regulo Bright CNP    Return in about 1 year (around 3/3/2022).

## 2021-04-29 NOTE — ED PROVIDER NOTES
HPI:  4/29/21, Time: 4:57 AM EDT        Jessica Neri is a 78 y.o. male presenting to the ED for bilateral leg pain with some redness and swelling, beginning 3 weeks ago. The complaint has been persistent, moderate in severity, and worsened by standing. Patient has not had any associated chest heaviness/pressure or tightness nor any fever/chills. He states that his primary care doctor who is giving him several creams to take for his legs and he showed boxes of Bactroban ointment and also triamcinolone cream which he has been applying without significant improvement. Patient does have a history of chronic atrial fibrillation and is on anticoagulation with Eliquis. He has not had any missed medication doses, he denies any chest pain, no shortness of breath with ambulation, no other complaints. The patient actually drove himself here for evaluation. He rates his leg pain a 10/10 severity but this is primarily on the anterior surface of his legs and he denies any calf pain currently. No relieving factors are reported. No other complaints. Review of Systems:   A complete review of systems was performed and pertinent positives and negatives are stated within HPI, all other systems reviewed and are negative.    --------------------------------------------- PAST HISTORY ---------------------------------------------  Past Medical History:  has a past medical history of Atrial fibrillation ([facility] [address] CKD (chronic kidney disease) stage 3, GFR 30-59 ml/min, H/O syncope, Hemorrhoids, Hyperlipidemia, and Hypertension. Past Surgical History:  has a past surgical history that includes Carotid endarterectomy; shoulder surgery (Bilateral); Finger trigger release (Bilateral); knee surgery; Cholecystectomy; Carpal tunnel release (Bilateral); and Upper gastrointestinal endoscopy (N/A, 2/7/2019). Social History:  reports that he quit smoking about 38 years ago. His smoking use included cigarettes.  He started smoking about 71 years ago. He smoked 0.00 packs per day. He has never used smokeless tobacco. He reports previous alcohol use. He reports that he does not use drugs. Family History: family history includes Cancer in his mother. The patients home medications have been reviewed. Allergies: Patient has no known allergies.     -------------------------------------------------- RESULTS -------------------------------------------------  All laboratory and radiology results have been personally reviewed by myself   LABS:  Results for orders placed or performed during the hospital encounter of 04/29/21   CBC Auto Differential   Result Value Ref Range    WBC 6.9 4.5 - 11.5 E9/L    RBC 2.98 (L) 3.80 - 5.80 E12/L    Hemoglobin 10.0 (L) 12.5 - 16.5 g/dL    Hematocrit 30.7 (L) 37.0 - 54.0 %    .0 (H) 80.0 - 99.9 fL    MCH 33.6 26.0 - 35.0 pg    MCHC 32.6 32.0 - 34.5 %    RDW 17.8 (H) 11.5 - 15.0 fL    Platelets 595 296 - 413 E9/L    MPV 11.4 7.0 - 12.0 fL    Neutrophils % 59.2 43.0 - 80.0 %    Immature Granulocytes % 0.3 0.0 - 5.0 %    Lymphocytes % 23.6 20.0 - 42.0 %    Monocytes % 10.8 2.0 - 12.0 %    Eosinophils % 5.4 0.0 - 6.0 %    Basophils % 0.7 0.0 - 2.0 %    Neutrophils Absolute 4.06 1.80 - 7.30 E9/L    Immature Granulocytes # 0.02 E9/L    Lymphocytes Absolute 1.62 1.50 - 4.00 E9/L    Monocytes Absolute 0.74 0.10 - 0.95 E9/L    Eosinophils Absolute 0.37 0.05 - 0.50 E9/L    Basophils Absolute 0.05 0.00 - 0.20 S5/E   Basic Metabolic Panel   Result Value Ref Range    Sodium 140 132 - 146 mmol/L    Potassium 5.6 (H) 3.5 - 5.0 mmol/L    Chloride 108 (H) 98 - 107 mmol/L    CO2 23 22 - 29 mmol/L    Anion Gap 9 7 - 16 mmol/L    Glucose 101 (H) 74 - 99 mg/dL    BUN 66 (H) 6 - 23 mg/dL    CREATININE 2.1 (H) 0.7 - 1.2 mg/dL    GFR Non-African American 30 >=60 mL/min/1.73    GFR African American 37     Calcium 9.5 8.6 - 10.2 mg/dL   Lactic Acid, Plasma   Result Value Ref Range    Lactic Acid 0.9 0.5 - 2.2 mmol/L   D-Dimer, Quantitative   Result Value Ref Range    D-Dimer, Quant 533 ng/mL DDU   Brain Natriuretic Peptide   Result Value Ref Range    Pro-BNP 17,337 (H) 0 - 450 pg/mL       RADIOLOGY:  Interpreted by Radiologist.  No orders to display       ------------------------- NURSING NOTES AND VITALS REVIEWED ---------------------------    The nursing notes within the ED encounter and vital signs as below have been reviewed. BP (!) 142/70   Pulse 60   Temp 98.2 °F (36.8 °C) (Temporal)   Resp 16   SpO2 96%   Oxygen Saturation Interpretation: Normal    ---------------------------------------------------PHYSICAL EXAM--------------------------------------    Constitutional/General: Alert and oriented x3, well appearing, non toxic in mild distress at rest  Head: Normocephalic and atraumatic  Eyes: PERRL, EOMI  Mouth: Oropharynx clear, handling secretions, no trismus  Neck: Supple, full ROM, no JVD. Trachea midline  Pulmonary: Lungs clear to auscultation bilaterally, no wheezes, rales, or rhonchi. Not in respiratory distress  Cardiovascular:  Regular rate and rhythm, no murmurs, gallops, or rubs. 2+ distal pulses  Abdomen: Soft, non tender, non distended, no organomegaly no masses no guarding or rigidity normal bowel sounds  Extremities: Moves all extremities x 4. Warm and well perfused; 3+ edema bilateral lower extremities with venous stasis dermatitis but no definite warmth on palpation no calf tenderness  Skin: warm and dry without rash  Neurologic: GCS 15, cranial nerves II through XII intact no focal deficits. No meningeal signs.   Psych: Normal Affect    ------------------------------ ED COURSE/MEDICAL DECISION MAKING----------------------  Medications   0.9 % sodium chloride bolus (has no administration in time range)   furosemide (LASIX) injection 40 mg (has no administration in time range)     ED COURSE:    Medical Decision Making:   Differential Diagnoses:  Patient does appear to have acute on chronic renal insufficiency (past history of stage III chronic kidney disease). The patient has venous stasis dermatitis but not likely acute DVT as his lower extremity edema is bilaterally symmetric. Patient additionally is already anticoagulated with Eliquis. I offered admission of the patient to the hospital so that he has edema could be reduced with diuretics and we did initiate diuretic therapy with Lasix here. The patient drove himself here for evaluation and is alert and oriented x3 and certainly mentally competent for medical decision-making but he has elected to refuse admission at this time because of other appointments which she has. Patient understands potential risks of deterioration of his health including death and/or disability without questions. He understands also that he is welcome to return at any time. We will have the patient sign himself out [address] and did suggest he follow-up with his family doctor later on today. EKG #1:  Interpreted by emergency department physician unless otherwise noted. Time:  04:12    Rate: 75 bpm  Rhythm: Atrial Fibrillation w/controlled ventricular response. Interpretation: with nonspecific ST segment and T waves changes. Comparison: There are no significant changes when compared to prior tracings. Counseling: The emergency provider has spoken with the patient and discussed todays results, in addition to providing specific details for the plan of care and counseling regarding the diagnosis and prognosis. Questions are answered at this time and they are agreeable with the plan.      --------------------------------- IMPRESSION AND DISPOSITION ---------------------------------    IMPRESSION  1. Dependent edema    2. Acute congestive heart failure, unspecified heart failure type ([address]    3. Acute renal failure superimposed on stage 3a chronic kidney disease, unspecified acute renal failure type ([address]    4. Left against medical advice    5.  Hyperkalemia

## 2021-04-29 NOTE — ED NOTES
Instructions provided and questions answered. Iv disconnected, site wnl.        Timbo Mcclellan RN  04/29/21 9331

## 2021-05-29 NOTE — ED PROVIDER NOTES
reviewed. Allergies: Patient has no known allergies.     -------------------------------------------------- RESULTS -------------------------------------------------  All laboratory and radiology results have been personally reviewed by myself   LABS:  Results for orders placed or performed during the hospital encounter of 05/29/21   CBC Auto Differential   Result Value Ref Range    WBC 6.2 4.5 - 11.5 E9/L    RBC 2.99 (L) 3.80 - 5.80 E12/L    Hemoglobin 10.0 (L) 12.5 - 16.5 g/dL    Hematocrit 31.1 (L) 37.0 - 54.0 %    .0 (H) 80.0 - 99.9 fL    MCH 33.4 26.0 - 35.0 pg    MCHC 32.2 32.0 - 34.5 %    RDW 15.9 (H) 11.5 - 15.0 fL    Platelets 324 312 - 900 E9/L    MPV 11.0 7.0 - 12.0 fL    Neutrophils % 66.7 43.0 - 80.0 %    Immature Granulocytes % 0.2 0.0 - 5.0 %    Lymphocytes % 19.1 (L) 20.0 - 42.0 %    Monocytes % 9.5 2.0 - 12.0 %    Eosinophils % 3.9 0.0 - 6.0 %    Basophils % 0.6 0.0 - 2.0 %    Neutrophils Absolute 4.15 1.80 - 7.30 E9/L    Immature Granulocytes # 0.01 E9/L    Lymphocytes Absolute 1.19 (L) 1.50 - 4.00 E9/L    Monocytes Absolute 0.59 0.10 - 0.95 E9/L    Eosinophils Absolute 0.24 0.05 - 0.50 E9/L    Basophils Absolute 0.04 0.00 - 0.20 E9/L   Comprehensive Metabolic Panel w/ Reflex to MG   Result Value Ref Range    Sodium 140 132 - 146 mmol/L    Potassium reflex Magnesium 5.8 (H) 3.5 - 5.0 mmol/L    Chloride 108 (H) 98 - 107 mmol/L    CO2 23 22 - 29 mmol/L    Anion Gap 9 7 - 16 mmol/L    Glucose 108 (H) 74 - 99 mg/dL    BUN 73 (H) 6 - 23 mg/dL    CREATININE 2.4 (H) 0.7 - 1.2 mg/dL    GFR Non-African American 26 >=60 mL/min/1.73    GFR African American 31     Calcium 9.4 8.6 - 10.2 mg/dL    Total Protein 7.0 6.4 - 8.3 g/dL    Albumin 3.9 3.5 - 5.2 g/dL    Total Bilirubin 0.7 0.0 - 1.2 mg/dL    Alkaline Phosphatase 213 (H) 40 - 129 U/L    ALT 14 0 - 40 U/L    AST 25 0 - 39 U/L   Amylase   Result Value Ref Range    Amylase 38 20 - 100 U/L   Troponin   Result Value Ref Range    Troponin, High Sensitivity 67 (H) 0 - 11 ng/L   Lactic Acid, Plasma   Result Value Ref Range    Lactic Acid 1.0 0.5 - 2.2 mmol/L   EKG 12 Lead   Result Value Ref Range    Ventricular Rate 65 BPM    Atrial Rate 55 BPM    QRS Duration 88 ms    Q-T Interval 416 ms    QTc Calculation (Bazett) 432 ms    R Axis 15 degrees    T Axis 12 degrees       RADIOLOGY:  Interpreted by Radiologist.  XR CHEST PORTABLE   Final Result   Borderline size heart. Slightly prominent perihilar vessels. The cannot   exclude minimal left-sided pleural effusion. CT ABDOMEN PELVIS WO CONTRAST Additional Contrast? None   Final Result   1. No evidence of bowel obstruction. 2. Moderate to large amount of ascites. Findings consistent with anasarca. 3. Moderate left and small right pleural effusions with mild left basilar   atelectasis. 4. Slightly nodular contour of the liver that can be associated with   cirrhosis although this is equivocal.   5. Sigmoid diverticulosis. No evidence of diverticulitis.             ------------------------- NURSING NOTES AND VITALS REVIEWED ---------------------------   The nursing notes within the ED encounter and vital signs as below have been reviewed. BP (!) 158/72   Pulse 62   Temp 98.1 °F (36.7 °C) (Temporal)   Resp 15   Ht 5' 9\" (1.753 m)   Wt 220 lb (99.8 kg)   SpO2 98%   BMI 32.49 kg/m²   Oxygen Saturation Interpretation: Normal      ---------------------------------------------------PHYSICAL EXAM--------------------------------------    Constitutional/General: Alert and oriented x3, well appearing, non toxic in NAD  Head: NC/AT  Eyes: PERRL, EOMI  Mouth: Oropharynx clear, handling secretions, no trismus  Neck: Supple, full ROM, no meningeal signs  Pulmonary: Lungs clear to auscultation bilaterally, no wheezes, rales, or rhonchi. Not in respiratory distress  Cardiovascular:  Regular rate and rhythm, no murmurs, gallops, or rubs. 2+ distal pulses  Abdomen:  The abdomen is distended, firm, with

## 2021-05-29 NOTE — ED NOTES
Discharge instructions given, patient verbalized their understanding, no other noted or stated problems at this time. Patient will follow up with primary doctor for care.      Hao Pereira RN  05/29/21 1592

## 2021-06-10 NOTE — PROGRESS NOTES
Patient fully vaccinated against Covid-19. No pre-op Covid-19 test needed. Vaccine documented in Epic.

## 2021-06-11 NOTE — PROGRESS NOTES
[facility] PRE-ADMISSION TESTING INSTRUCTIONS    The Preadmission Testing patient is instructed accordingly using the following criteria (check applicable):    ARRIVAL INSTRUCTIONS:  [x] Parking the day of Surgery is located in the Main Entrance lot. Upon entering the door, make an immediate right to the surgery reception desk    [x] Bring photo ID and insurance card    [] Bring in a copy of Living will or Durable Power of  papers. [x] Please be sure to arrange for responsible adult to provide transportation to and from the hospital    [x] Please arrange for responsible adult to be with you for the 24 hour period post procedure due to having anesthesia      GENERAL INSTRUCTIONS:    [x] Nothing by mouth after midnight, including gum, candy, mints or water    [x] You may brush your teeth, but do not swallow any water    [x] Take medications as instructed with 1-2 oz of water    [x] Stop herbal supplements and vitamins 5 days prior to procedure    [x] Follow preop dosing of blood thinners per physician instructions    [] Take 1/2 dose of evening insulin, but no insulin after midnight    [] No oral diabetic medications after midnight    [] If diabetic and have low blood sugar or feel symptomatic, take 1-2oz apple juice only    [] Bring inhalers day of surgery    [] Bring C-PAP/ Bi-Pap day of surgery    [] Bring urine specimen day of surgery    [x] Shower or bath with soap, lather and rinse well, AM of Surgery, no lotion, powders or creams to surgical site    [] Follow bowel prep as instructed per surgeon    [x] No tobacco products within 24 hours of surgery     [x] No alcohol or illegal drug use within 24 hours of surgery.     [x] Jewelry, body piercing's, eyeglasses, contact lenses and dentures are not permitted into surgery (bring cases)      [] Please do not wear any nail polish, make up or hair products on the day of surgery    [x] You can expect a call the business day prior to procedure to notify you if your arrival time changes    [x] If you receive a survey after surgery we would greatly appreciate your comments    [] Parent/guardian of a minor must accompany their child and remain on the premises  the entire time they are under our care     [] Pediatric patients may bring favorite toy, blanket or comfort item with them    [] A caregiver or family member must remain with the patient during their stay if they are mentally handicapped, have dementia, disoriented or unable to use a call light or would be a safety concern if left unattended    [x] Please notify surgeon if you develop any illness between now and time of surgery (cold, cough, sore throat, fever, nausea, vomiting) or any signs of infections  including skin, wounds, and dental.    [x]  The Outpatient Pharmacy is available to fill your prescription here on your day of surgery, ask your preop nurse for details    [] Other instructions    EDUCATIONAL MATERIALS PROVIDED:    [] PAT Preoperative Education Packet/Booklet     [] Medication List    [] Transfusion bracelet applied with instructions    [] Shower with soap, lather and rinse well, and use CHG wipes provided the evening before surgery as instructed    [] Incentive spirometer with instructions

## 2021-06-11 NOTE — PROGRESS NOTES

## 2021-06-15 PROBLEM — N17.9 AKI (ACUTE KIDNEY INJURY) (HCC): Status: ACTIVE | Noted: 2021-01-01

## 2021-06-15 PROBLEM — R33.8 ACUTE URINARY RETENTION: Status: RESOLVED | Noted: 2020-03-09 | Resolved: 2021-01-01

## 2021-06-15 PROBLEM — R18.8 ASCITES: Status: ACTIVE | Noted: 2021-01-01

## 2021-06-15 PROBLEM — K57.32 SIGMOID DIVERTICULITIS: Status: RESOLVED | Noted: 2019-12-19 | Resolved: 2021-01-01

## 2021-06-15 PROBLEM — K62.5 RECTAL BLEEDING: Status: RESOLVED | Noted: 2020-03-09 | Resolved: 2021-01-01

## 2021-06-15 NOTE — PROGRESS NOTES
Message sent to Dr. Ro Colindres regarding patient new consult.  Electronically signed by Cassie Koehler RN on 6/15/2021 at 1:42 PM

## 2021-06-15 NOTE — PROGRESS NOTES
1210 report called to Kandace williamson and called out to waiting room to update pts leila on his room number [id number]

## 2021-06-15 NOTE — OP NOTE
aspirated to ensure enough fluid was collected for cytology. The needle was removed and a 4-0 nylon suture was placed. Dr. Yuan Goodson was present for entire procedure      DISPOSITION: Anesthesia was discontinued and the patient  was transferred to the PACU in good condition.  He is going to be admitted due to his KEANU and hyperkalemia     Electronically signed by Naga Aranda DO on 6/15/2021 at 11:23 AM

## 2021-06-15 NOTE — ANESTHESIA PRE PROCEDURE
Department of Anesthesiology  Preprocedure Note       Name:  Kisha Gottlieb   Age:  78 y.o.  :  1940                                          MRN:  [id number]         Date:  6/15/2021      Surgeon: Sugey Diaz):  Rene Galloway MD    Procedure: ULTRASOUND GUIDED PARACENTESIS (N/A Abdomen)    Medications prior to admission:   Prior to Admission medications    Medication Sig Start Date End Date Taking?  Authorizing Provider   hydrOXYzine (ATARAX) 10 MG tablet Take 10 mg by mouth nightly    Historical Provider, MD   levothyroxine (SYNTHROID) 25 MCG tablet Take 25 mcg by mouth Daily    Historical Provider, MD   pantoprazole (PROTONIX) 40 MG tablet Take 40 mg by mouth daily    Historical Provider, MD   lisinopril (PRINIVIL;ZESTRIL) 5 MG tablet Take 5 mg by mouth daily    Historical Provider, MD   furosemide (LASIX) 40 MG tablet Take 1 tablet by mouth 2 times daily for 5 days 21  Emilee Farah MD   hydrALAZINE (APRESOLINE) 50 MG tablet Take 1.5 tablets by mouth 3 times daily 3/10/20   Leeroy Mckenna MD   SODIUM BICARBONATE PO Take 650 mg by mouth 2 times daily    Historical Provider, MD   metoprolol succinate (TOPROL XL) 50 MG extended release tablet Take 1 tablet by mouth daily 19   Marilu Rodriguez,    tamsulosin [facility]) 0.4 MG capsule Take 1 capsule by mouth daily 19   Marilu Rodriguez, DO   amLODIPine (NORVASC) 10 MG tablet Take 1 tablet by mouth daily 19   Marilu Rodriguez,    vitamin D 25 MCG (1000 UT) CAPS Take 2,000 Units by mouth daily    Historical Provider, MD   allopurinol (ZYLOPRIM) 300 MG tablet Take 300 mg by mouth daily    Historical Provider, MD   Apixaban (ELIQUIS PO) Take 5 mg by mouth 2 times daily     Historical Provider, MD   ferrous sulfate 325 (65 Fe) MG tablet Take 325 mg by mouth 2 times daily    Historical Provider, MD   calcitRIOL (ROCALTROL) 0.25 MCG capsule Take 0.25 mcg by mouth daily    Historical Provider, MD   atorvastatin (LIPITOR) 40 MG tablet Take 40 mg by mouth daily  10/1/17   Historical Provider, MD   primidone (MYSOLINE) 50 MG tablet Take 100 mg by mouth 2 times daily  10/25/17   Historical Provider, MD       Current medications:    No current facility-administered medications for this visit. No current outpatient medications on file. Facility-Administered Medications Ordered in Other Visits   Medication Dose Route Frequency Provider Last Rate Last Admin    ceFAZolin (ANCEF) 2000 mg in sterile water 20 mL IV syringe  2,000 mg Intravenous On Call to [address] [place] [address] MD Shaun        lactated ringers infusion   Intravenous Continuous Nish Escobar MD        sodium chloride flush 0.9 % injection 5-40 mL  5-40 mL Intravenous 2 times per day Nish Escobar MD        sodium chloride flush 0.9 % injection 5-40 mL  5-40 mL Intravenous PRN Nish Escobar MD           Allergies:  No Known Allergies    Problem List:    Patient Active Problem List   Diagnosis Code    HTN (hypertension), benign I10    CKD (chronic kidney disease) stage 3, GFR 30-59 ml/min ([facility]) N18.30    Atrial fibrillation, chronic (HCC) I48.20    Sigmoid diverticulitis K57.32    Obesity (BMI 30-39. 9) E66.9    Hyperlipidemia LDL goal <100 E78.5    Bilateral carotid artery stenosis I65.23    Acute urinary retention R33.8    Hyperkalemia E87.5    Rectal bleeding K62.5       Past Medical History:        Diagnosis Date    Ascites     Atrial fibrillation (HCC)     CKD (chronic kidney disease) stage 3, GFR 30-59 ml/min ([facility] [address] 12/17/2014    Hyperlipidemia     Hypertension        Past Surgical History:        Procedure Laterality Date    CAROTID ENDARTERECTOMY      CARPAL TUNNEL RELEASE Bilateral     CHOLECYSTECTOMY      COLONOSCOPY      FINGER TRIGGER RELEASE Bilateral     KNEE SURGERY Right     SHOULDER SURGERY Bilateral     UPPER GASTROINTESTINAL ENDOSCOPY N/A 2/7/2019    EGD BIOPSY performed by Nish Escobar MD at [address] History:    Social History     Tobacco Use    Smoking status: Former Smoker     Packs/day: 0.00     Types: Cigarettes     Start date: 10/31/1949     Quit date: 1982     Years since quittin.1    Smokeless tobacco: Never Used   Substance Use Topics    Alcohol use: Not Currently     Alcohol/week: 0.0 standard drinks                                Counseling given: Not Answered      Vital Signs (Current): There were no vitals filed for this visit. BP Readings from Last 3 Encounters:   06/15/21 (!) 186/86   21 (!) 158/72   21 (!) 142/70       NPO Status:  2019                                                                               BMI:   Wt Readings from Last 3 Encounters:   06/15/21 230 lb (104.3 kg)   21 220 lb (99.8 kg)   21 225 lb (102.1 kg)     There is no height or weight on file to calculate BMI.    CBC:   Lab Results   Component Value Date    WBC 6.2 2021    RBC 2.99 2021    HGB 10.0 2021    HCT 31.1 2021    .0 2021    RDW 15.9 2021     2021       CMP:   Lab Results   Component Value Date     2021    K 5.8 2021     2021    CO2 23 2021    BUN 73 2021    CREATININE 2.4 2021    GFRAA 31 2021    LABGLOM 26 2021    GLUCOSE 108 2021    GLUCOSE 108 2012    PROT 7.0 2021    CALCIUM 9.4 2021    BILITOT 0.7 2021    ALKPHOS 213 2021    AST 25 2021    ALT 14 2021       POC Tests: No results for input(s): POCGLU, POCNA, POCK, POCCL, POCBUN, POCHEMO, POCHCT in the last 72 hours.     Coags:   Lab Results   Component Value Date    PROTIME 16.2 2020    PROTIME 13.2 2012    INR 1.5 2020    APTT 33.5 2020       HCG (If Applicable): No results found for: PREGTESTUR, PREGSERUM, HCG, HCGQUANT     ABGs: No results found for: PHART, PO2ART, EHJ4OFS, UBW3GHM, BEART, S6WQLSYN     Type & Screen (If Applicable):  No results found for: LABABO, LABRH     EKG 02/06/2019    Atrial fibrillation  Inferior infarct , age undetermined  Abnormal ECG    Echo 12/17/2014     Summary   Left ventricular size is grossly normal. Normal LV segmental wall motion.   Ejection fraction is visually estimated at 60%. Mild left ventricular   concentric hypertrophy noted.       Anesthesia Evaluation  Patient summary reviewed and Nursing notes reviewed no history of anesthetic complications:   Airway: Mallampati: III  TM distance: >3 FB   Neck ROM: full  Mouth opening: > = 3 FB Dental: normal exam         Pulmonary: breath sounds clear to auscultation  (+) shortness of breath: chronic,                            ROS comment: Hx: former smoker- quit 05/19/82   Cardiovascular:  Exercise tolerance: no interval change,   (+) hypertension: moderate, dysrhythmias: atrial fibrillation, hyperlipidemia      ECG reviewed  Rhythm: irregular  Rate: normal  Echocardiogram reviewed         Beta Blocker:  Dose within 24 Hrs         Neuro/Psych:   Negative Neuro/Psych ROS              GI/Hepatic/Renal:   (+) renal disease: CRI,          ROS comment: Ascites. Endo/Other:    (+) blood dyscrasia: anticoagulation therapy and anemia, arthritis: OA., .                 Abdominal:           Vascular:   + PVD, aortic or cerebral, . Anesthesia Plan      MAC     ASA 4       Induction: intravenous. MIPS: Prophylactic antiemetics administered. Anesthetic plan and risks discussed with patient and child/children. Plan discussed with CRNA.                   Brooke Cisneros MD   6/15/2021

## 2021-06-15 NOTE — PROGRESS NOTES
Message sent to Dr. Emili Briceno regarding new consult.  Electronically signed by Keyanna Martini RN on 6/15/2021 at 1:35 PM

## 2021-06-15 NOTE — INTERVAL H&P NOTE
Update History & Physical    The patient's History and Physical of June 9, 2021 was reviewed with the patient and I examined the patient. There was no change. The surgical site was confirmed by the patient and me. Plan: The risks, benefits, expected outcome, and alternative to the recommended procedure have been discussed with the patient. Patient understands and wants to proceed with the procedure.      Electronically signed by Sophie Kasper MD on 6/15/2021 at 9:51 AM

## 2021-06-15 NOTE — H&P
Internal Medicine History & Physical     Name: Alfa Hendricks  : 1940  Chief Complaint: KEANU  Primary Care Physician: Obi Tolentino DO  Admission date: 6/15/2021  Date of service: 6/15/2021     History of Present Illness  Brent Hugo is a 78y.o. year old male. He presented to the hospital with lower extremity edema and abdominal swelling. He was seen in the emergency department. He was sent home after being found to have ascites. He had an elective, outpatient paracentesis due to the ascites on 6/15. Labs revealed hyperkalemia as well as acute kidney injury. The general surgeon that performed the procedure called me and asked me to directly admit the patient. The patient does follow with nephrology on outpatient basis. His symptoms of abdominal distention and lower extremity edema have been going on for a couple of weeks. These symptoms are moderate in severity. Symptoms are made better by nothing. Symptoms are made worse by nothing. Associated symptoms include lower extremity edema and poor appetite. The patient granddaughter was present at bedside. History is provided by the granddaughter. She is felt to be an excellent historian.     Past Medical History:   Diagnosis Date    Ascites     Atrial fibrillation (HCC)     Bilateral carotid artery stenosis 2020    CKD (chronic kidney disease) stage 3, GFR 30-59 ml/min (HCC) 2014    Hyperlipidemia     Hypertension     Obesity (BMI 30-39.9) 2019       Past Surgical History:   Procedure Laterality Date    CAROTID ENDARTERECTOMY      CARPAL TUNNEL RELEASE Bilateral     CHOLECYSTECTOMY      COLONOSCOPY      FINGER TRIGGER RELEASE Bilateral     KNEE SURGERY Right     SHOULDER SURGERY Bilateral     UPPER GASTROINTESTINAL ENDOSCOPY N/A 2019    EGD BIOPSY performed by Radha Hamilton MD at [facility] ENDOSCOPY       Family History   Problem Relation Age of Onset    Cancer Mother        Social History  Patient lives at home with his granddaughter. Employment: Retired  Illicit drug use- denies  TOBACCO:   reports that he quit smoking about 39 years ago. His smoking use included cigarettes. He started smoking about 71 years ago. He smoked 0.00 packs per day. He has never used smokeless tobacco.  ETOH:   reports previous alcohol use. Home Medications  Prior to Admission medications    Medication Sig Start Date End Date Taking?  Authorizing Provider   hydrOXYzine (ATARAX) 10 MG tablet Take 10 mg by mouth nightly   Yes Historical Provider, MD   levothyroxine (SYNTHROID) 25 MCG tablet Take 25 mcg by mouth Daily   Yes Historical Provider, MD   pantoprazole (PROTONIX) 40 MG tablet Take 40 mg by mouth daily   Yes Historical Provider, MD   lisinopril (PRINIVIL;ZESTRIL) 5 MG tablet Take 5 mg by mouth daily   Yes Historical Provider, MD   furosemide (LASIX) 40 MG tablet Take 1 tablet by mouth 2 times daily for 5 days 4/29/21 6/11/21 Yes Alexys Busch MD   hydrALAZINE (APRESOLINE) 50 MG tablet Take 1.5 tablets by mouth 3 times daily 3/10/20  Yes Denys Hutchins MD   SODIUM BICARBONATE PO Take 650 mg by mouth 2 times daily   Yes Historical Provider, MD   metoprolol succinate (TOPROL XL) 50 MG extended release tablet Take 1 tablet by mouth daily 11/5/19  Yes Rudolph Toney DO   tamsulosin (FLOMAX) 0.4 MG capsule Take 1 capsule by mouth daily 11/5/19  Yes Rudolph Toney DO   amLODIPine (NORVASC) 10 MG tablet Take 1 tablet by mouth daily 11/5/19  Yes Rudolph Toney DO   vitamin D 25 MCG (1000 UT) CAPS Take 2,000 Units by mouth daily   Yes Historical Provider, MD   allopurinol (ZYLOPRIM) 300 MG tablet Take 300 mg by mouth daily   Yes Historical Provider, MD   Apixaban (ELIQUIS PO) Take 5 mg by mouth 2 times daily    Yes Historical Provider, MD   ferrous sulfate 325 (65 Fe) MG tablet Take 325 mg by mouth 2 times daily   Yes Historical Provider, MD   calcitRIOL (ROCALTROL) 0.25 MCG capsule Take 0.25 mcg by mouth daily   Yes Historical Provider, MD atorvastatin (LIPITOR) 40 MG tablet Take 40 mg by mouth daily  10/1/17  Yes Historical Provider, MD   primidone (MYSOLINE) 50 MG tablet Take 100 mg by mouth 2 times daily  10/25/17  Yes Historical Provider, MD       Allergies  No Known Allergies    Review of Systems  Please see HPI above. All bolded are positive. All un-bolded are negative.   Constitutional Symptoms: fever, chills, fatigue, generalized weakness, diaphoresis, increase in thirst, loss of appetite  Eyes: vision change   Ears, Nose, Mouth, Throat: hearing loss, nasal congestion, sores in the mouth  Cardiovascular: chest pain, chest heaviness, palpitations  Respiratory: shortness of breath, wheezing, coughing  Gastrointestinal: abdominal pain, nausea, vomiting, diarrhea, constipation, melena, hematochezia, hematemesis  Genitourinary: dysuria, hematuria, increased frequency  Musculoskeletal: lower extremity edema, myalgias, arthralgias, back pain  Integumentary: rashes, itching   Neurological: headache, lightheadedness, dizziness, confusion, syncope, numbness, tingling, focal weakness  Psychiatric: depression, suicidal ideation, anxiety  Endocrine: unintentional weight change  Hematologic/Lymphatic: lymphadenopathy, easy bruising, easy bleeding   Allergic/Immunologic: recurrent infections      Objective  VITALS:  BP (!) 183/79   Pulse 77   Temp 98.1 °F (36.7 °C) (Oral)   Resp 16   Ht 5' 9\" (1.753 m)   Wt 230 lb (104.3 kg)   SpO2 96%   BMI 33.97 kg/m²     Physical Exam:  General: awake, alert, oriented to person, place, time, and purpose, appears stated age, cooperative, no acute distress, pleasant, appropriate mood, depressed  Eyes: conjunctivae/corneas clear, sclera non icteric, EOMI  Ears: no obvious scars, no lesions, no masses, hearing intact  Mouth: mucous membranes moist, no obvious oral sores  Head: normocephalic, atraumatic  Neck: no JVD, no adenopathy, no thyromegaly, neck is supple, trachea is midline  Back: ROM normal, no CVA tenderness. Chest: no pain on palpation  Lungs: clear to auscultation bilaterally, without rhonchi, crackle, wheezing, or rale, no retractions or use of accessory muscles  Heart: regular rate and regular rhythm, systolic murmur, normal S1, S2  Abdomen: Distended abdomen with fluid wave, soft, non-tender; bowel sounds normal; no masses, no organomegaly  : Deferred   Extremities: 2+ pitting bilateral lower extremity edema, extremities atraumatic, no cyanosis, no clubbing, 2+ pedal pulses palpated  Skin: normal color, normal texture, normal turgor, no rashes, no lesions  Neurologic:5/5 muscle strength throughout, normal muscle tone throughout, face symmetric, hearing intact, tongue midline, speech appropriate without slurring, sensation to fine touch intact in upper and lower extremities    Labs-   Lab Results   Component Value Date    WBC 6.6 06/15/2021    HGB 9.2 (L) 06/15/2021    HCT 28.9 (L) 06/15/2021     06/15/2021     06/15/2021    K 6.5 (H) 06/15/2021     06/15/2021    CREATININE 4.6 (H) 06/15/2021     (HH) 06/15/2021    CO2 21 (L) 06/15/2021    GLUCOSE 104 (H) 06/15/2021    ALT 14 05/29/2021    AST 25 05/29/2021    INR 1.5 03/08/2020     Lab Results   Component Value Date    CKTOTAL 351 (H) 12/18/2014    CKMB 2.5 12/18/2014    TROPONINI 0.01 12/19/2019       Last echocardiogram:2019   Left ventricle grossly normal in size. Mild left ventricular concentric hypertrophy noted. Proximal septal thickening. Normal LV segmental wall motion. Estimated left ventricular ejection fraction is 69±5%. Diastolic function could not be accurately assessed. The LAESV Index is >34 ml/m2. Moderately dilated right ventricle. Right ventricular segmental wall motion is normal.   Mild mitral regurgitation is present. Technically fair quality study. Technically difficult study due to patient''s body habitus. No comparison study available. Suggest clinical correlation.     Recent Radiological Studies:  CT ABDOMEN PELVIS WO CONTRAST Additional Contrast? None    Result Date: 5/29/2021  EXAMINATION: CT OF THE ABDOMEN AND PELVIS WITHOUT CONTRAST 5/29/2021 8:18 am TECHNIQUE: CT of the abdomen and pelvis was performed without the administration of intravenous contrast. Multiplanar reformatted images are provided for review. Dose modulation, iterative reconstruction, and/or weight based adjustment of the mA/kV was utilized to reduce the radiation dose to as low as reasonably achievable. COMPARISON: CT abdomen and pelvis dated 12/19/2019 HISTORY: ORDERING SYSTEM PROVIDED HISTORY: Abdominal distention TECHNOLOGIST PROVIDED HISTORY: Reason for exam:->Abdominal distention Additional Contrast?->None Decision Support Exception - unselect if not a suspected or confirmed emergency medical condition->Emergency Medical Condition (MA) FINDINGS: Lower Chest: There are moderate left and small right pleural effusions with some compression atelectasis in the left lower lobe. No definite acute airspace disease is seen at either lung base. The heart is top-normal in size. There are mild coronary artery calcifications which is a marker for coronary atherosclerotic disease. Organs: No obvious hepatic, splenic, pancreatic, adrenal or renal mass is seen. A lesion could be missed in the absence of IV contrast.  There is a stable approximately 2 cm hypodensity in the posterior aspect of the lower pole of the right kidney, consistent with a cyst.  There is no evidence of hydronephrosis or renal calculi. The gallbladder is surgically absent. The liver shows slightly nodular contour that can be associated with cirrhosis. GI/Bowel: There is no evidence of bowel obstruction. No evidence of abnormal bowel wall thickening or distension. There is sigmoid diverticulosis without evidence of diverticulitis. Pelvis: Moderate to large amount of free fluid is seen in the pelvis. No pelvic mass or lymphadenopathy is seen. Peritoneum/Retroperitoneum: There is moderate to large amount of ascites. No evidence of retroperitoneal lymphadenopathy. There is no evidence of intraperitoneal lymphadenopathy. Prominent calcified plaque is seen in the abdominal aorta and iliac vessels with no evidence of aneurysm. The bilateral iliac stents are noted. Bones/Soft Tissues:  Age related degenerative changes of the visualized osseous structures without focal destructive lesion. There is diffuse subcutaneous edema, consistent with anasarca. 1. No evidence of bowel obstruction. 2. Moderate to large amount of ascites. Findings consistent with anasarca. 3. Moderate left and small right pleural effusions with mild left basilar atelectasis. 4. Slightly nodular contour of the liver that can be associated with cirrhosis although this is equivocal. 5. Sigmoid diverticulosis. No evidence of diverticulitis. XR CHEST PORTABLE    Result Date: 5/29/2021  EXAMINATION: ONE XRAY VIEW OF THE CHEST 5/29/2021 8:18 am COMPARISON: February 12, 2019 HISTORY: ORDERING SYSTEM PROVIDED HISTORY: Shortness of breath TECHNOLOGIST PROVIDED HISTORY: Reason for exam:->Shortness of breath FINDINGS: The heart has upper borderline size. There is some relative prominence of the central perihilar vessels. No acute infiltrates or consolidations are seen. There is minimal blunting of the left-sided costophrenic sulcus which could represent minimal left-sided pleural effusion. Calcified granuloma likely seen in the right base. Borderline size heart. Slightly prominent perihilar vessels. The cannot exclude minimal left-sided pleural effusion.        Assessment  Active Hospital Problems    Diagnosis     KEANU (acute kidney injury) ([facility] [address] [N17.9]      Priority: High    Ascites [R18.8]      Priority: Medium    Hyperkalemia [E87.5]      Priority: Medium    CKD (chronic kidney disease) stage 3, GFR 30-59 ml/min (HCC) [N18.30]      Priority: Medium    Bilateral carotid artery stenosis [I65.23]     Obesity (BMI 30-39. 9) [E66.9]     Hyperlipidemia LDL goal <100 [E78.5]     Atrial fibrillation, chronic (HCC) [I48.20]     HTN (hypertension), benign [I10]        Patient Active Problem List    Diagnosis Date Noted    KEANU (acute kidney injury) ([facility] 06/15/2021     Priority: High    Ascites 06/15/2021     Priority: Medium    Hyperkalemia 03/09/2020     Priority: Medium    CKD (chronic kidney disease) stage 3, GFR 30-59 ml/min ([facility]) 12/17/2014     Priority: Medium    Bilateral carotid artery stenosis 02/05/2020    Obesity (BMI 30-39.9) 12/21/2019    Hyperlipidemia LDL goal <100 12/21/2019    Atrial fibrillation, chronic ([facility] 11/01/2019    HTN (hypertension), benign 12/17/2014       Plan  · KEANU on CKD 3 with hyperkalemia: Baseline serum creatinine somewhere between 1.4 and 2.4. Follow BMP. Urine labs. Defer diuresis to nephrology. Hold lisinopril, PO Lasix. Continue allopurinol/sodium bicarb. · Ascites with probable cirrhosis: SP diagnostic paracentesis on 6/15. GI consult for interpretation of ascitic fluid studies and cirrhosis w/u. Hepatitis panel. Check echo. · History of rectal cancer: General surgery following. Macrocytic anemia: Check Fe/Ferritin/TIBC/vitamin B12/folate. Follow H&H. Check stool for occult blood. · Continue home medications other than Lasix and lisinopril. · PT/OT  · Follow labs  · DVT prophylaxis. · Please see orders for further management and care. ·  for discharge planning  · Discharge plan: TBD pending clinical improvement     Abhi Hodges DO  6/15/2021  2:01 PM    I can be reached through [phone]. NOTE:  This report was transcribed using voice recognition software. Every effort was made to ensure accuracy; however, inadvertent computerized transcription errors may be present.

## 2021-06-15 NOTE — PLAN OF CARE
Problem: Skin Integrity:  Goal: Will show no infection signs and symptoms  Description: Will show no infection signs and symptoms  6/15/2021 1416 by Sultana Holley RN  Outcome: Met This Shift  Goal: Absence of new skin breakdown  Description: Absence of new skin breakdown  6/15/2021 1723 by Verdie Councilman, RN  Outcome: Met This Shift  6/15/2021 1416 by Sultana Holley RN  Outcome: Met This Shift     Problem: Falls - Risk of:  Goal: Will remain free from falls  Description: Will remain free from falls  6/15/2021 1723 by Verdie Councilman, RN  Outcome: Met This Shift  6/15/2021 1416 by Sultana Holley RN  Outcome: Met This Shift  Goal: Absence of physical injury  Description: Absence of physical injury  6/15/2021 1723 by Verdie Councilman, RN  Outcome: Met This Shift  6/15/2021 1416 by Sultana Holley RN  Outcome: Met This Shift

## 2021-06-15 NOTE — PLAN OF CARE
Problem: Skin Integrity:  Goal: Will show no infection signs and symptoms  Description: Will show no infection signs and symptoms  Outcome: Met This Shift  Goal: Absence of new skin breakdown  Description: Absence of new skin breakdown  Outcome: Met This Shift     Problem: Falls - Risk of:  Goal: Will remain free from falls  Description: Will remain free from falls  Outcome: Met This Shift  Goal: Absence of physical injury  Description: Absence of physical injury  Outcome: Met This Shift     Problem:  Activity:  Goal: Risk for activity intolerance will decrease  Description: Risk for activity intolerance will decrease  Outcome: Met This Shift     Problem: Coping:  Goal: Ability to identify and develop effective coping behavior will improve  Description: Ability to identify and develop effective coping behavior will improve  Outcome: Met This Shift     Problem: Fluid Volume:  Goal: Will show no signs or symptoms of fluid imbalance  Description: Will show no signs or symptoms of fluid imbalance  Outcome: Met This Shift     Problem: Health Behavior:  Goal: Ability to manage health-related needs will improve  Description: Ability to manage health-related needs will improve  Outcome: Met This Shift     Problem: Nutritional:  Goal: Maintenance of adequate nutrition will be supported  Description: Maintenance of adequate nutrition will be supported  Outcome: Met This Shift     Problem: Physical Regulation:  Goal: Complications related to the disease process, condition or treatment will be avoided or minimized  Description: Complications related to the disease process, condition or treatment will be avoided or minimized  Outcome: Met This Shift     Problem: Urinary Elimination:  Goal: Ability to achieve and maintain adequate urine output will be supported  Description: Ability to achieve and maintain adequate urine output will be supported  Outcome: Met This Shift

## 2021-06-16 NOTE — PROGRESS NOTES
Internal Medicine Progress Note    Patient's name: Lissa Javier  : 1940  Chief complaints (on day of admission): No chief complaint on file. Admission date: 6/15/2021  Date of service: 2021   Room: [address] INTERNAL MEDICINE  Primary care physician: Luke Tejada DO  Reason for visit: Follow-up for KEANU/hyperkalemia     Subjective  Jack Meyer was seen and examined. He was resting comfortably in his room. He is tolerating IV fluids. He feels well. Is not having pain. He still has abdominal distention. Review of Systems  There are no new complaints of chest pain, shortness of breath, abdominal pain, nausea, vomiting, diarrhea, constipation.     Hospital Medications  Current Facility-Administered Medications   Medication Dose Route Frequency Provider Last Rate Last Admin    sodium zirconium cyclosilicate (LOKELMA) oral suspension 5 g  5 g Oral TID Flower Reza MD        sodium chloride flush 0.9 % injection 10 mL  10 mL Intravenous 2 times per day Abhi Hodges, DO   10 mL at 06/15/21 2056    sodium chloride flush 0.9 % injection 10 mL  10 mL Intravenous PRN Abhi Hodges, DO        0.9 % sodium chloride infusion  25 mL Intravenous PRN Abhi Hodges, DO        ondansetron (ZOFRAN-ODT) disintegrating tablet 4 mg  4 mg Oral Q8H PRN Abhi Hodges, DO        Or    ondansetron (ZOFRAN) injection 4 mg  4 mg Intravenous Q6H PRN Abhi Hodges, DO        senna (SENOKOT) tablet 8.6 mg  1 tablet Oral Daily PRN Abhi Hodges, DO        allopurinol (ZYLOPRIM) tablet 300 mg  300 mg Oral Daily Abhi Hodges, DO        amLODIPine (NORVASC) tablet 10 mg  10 mg Oral Daily Abhi Hodges, DO        hydrALAZINE (APRESOLINE) tablet 75 mg  75 mg Oral TID Abhi Hodges, DO   75 mg at 06/15/21 2025    levothyroxine (SYNTHROID) tablet 25 mcg  25 mcg Oral Daily Abhi HAMPTON Volino, DO   25 mcg at 21 0558    hydrOXYzine (ATARAX) tablet 10 mg  10 mg Oral Nightly Abhi Hodges, DO   10 mg at 06/15/21 2026    metoprolol succinate (TOPROL XL) extended release tablet 50 mg  50 mg Oral Daily Abhi Hodges DO        pantoprazole (PROTONIX) tablet 40 mg  40 mg Oral Daily Abhi Hodges DO        primidone (MYSOLINE) tablet 100 mg  100 mg Oral BID Abhi Hodges, DO   100 mg at 06/15/21 2025    sodium bicarbonate tablet 650 mg  650 mg Oral BID Abhi Hodges, DO   650 mg at 06/15/21 2026    tamsulosin (FLOMAX) capsule 0.4 mg  0.4 mg Oral Daily Abhi Hodges DO        vitamin D (CHOLECALCIFEROL) tablet 2,000 Units  2,000 Units Oral Daily Abhi Hodges DO        perflutren lipid microspheres (DEFINITY) injection 1.65 mg  1.5 mL Intravenous ONCE PRN Abhi Hodges DO        apixaban (ELIQUIS) tablet 2.5 mg  2.5 mg Oral BID Abhi Hodges, DO   2.5 mg at 06/16/21 0558    0.9 % sodium chloride infusion   Intravenous Continuous Sondra Og  mL/hr at 06/15/21 2042 New Bag at 06/15/21 2042    dextrose 50 % IV solution  25 g Intravenous PRN Sondra Og MD           PRN Medications  sodium chloride flush, sodium chloride, ondansetron **OR** ondansetron, senna, perflutren lipid microspheres, dextrose    Objective  Most Recent Recorded Vitals  BP (!) 160/68   Pulse 80   Temp 98 °F (36.7 °C) (Oral)   Resp 18   Ht 5' 9\" (1.753 m)   Wt 242 lb 11.2 oz (110.1 kg)   SpO2 96%   BMI 35.84 kg/m²   I/O last 3 completed shifts: In: 12 [P.O.:240; I.V.:125]  Out: 475 [Urine:475]  No intake/output data recorded.     Physical Exam:  General: AAO to person/place/time/purpose, NAD, no labored breathing  Eyes: conjunctivae/corneas clear, sclera non icteric  Skin: color/texture/turgor normal, no rashes or lesions  Lungs: CTAB, no retractions/use of accessory muscles, no vocal fremitus, no rhonchi, no crackle, no rales  Heart: regular rate, regular rhythm, no murmur  Abdomen: distended with fluid wave soft, NT, bowel sounds normal  Extremities: atraumatic, LE edema  Neurologic: cranial nerves 2-12 grossly intact, no slurred speech    Most Recent Labs  Lab Results   Component Value Date    WBC 9.3 06/16/2021    HGB 9.8 (L) 06/16/2021    HCT 31.4 (L) 06/16/2021     06/16/2021     06/16/2021    K 5.5 (H) 06/16/2021     (H) 06/16/2021    CREATININE 4.2 (H) 06/16/2021     (H) 06/16/2021    CO2 21 (L) 06/16/2021    GLUCOSE 92 06/16/2021    ALT 22 06/16/2021    AST 42 (H) 06/16/2021    INR 1.2 06/15/2021    TSH 0.728 12/17/2014       US RETROPERITONEAL COMPLETE   Final Result   No sonographic evidence of acute renal pathology, mass, or calculus. Abdomen and pelvic ascites           CT ABDOMEN PELVIS WO CONTRAST Additional Contrast? None    Result Date: 5/29/2021  EXAMINATION: CT OF THE ABDOMEN AND PELVIS WITHOUT CONTRAST 5/29/2021 8:18 am TECHNIQUE: CT of the abdomen and pelvis was performed without the administration of intravenous contrast. Multiplanar reformatted images are provided for review. Dose modulation, iterative reconstruction, and/or weight based adjustment of the mA/kV was utilized to reduce the radiation dose to as low as reasonably achievable. COMPARISON: CT abdomen and pelvis dated 12/19/2019 HISTORY: ORDERING SYSTEM PROVIDED HISTORY: Abdominal distention TECHNOLOGIST PROVIDED HISTORY: Reason for exam:->Abdominal distention Additional Contrast?->None Decision Support Exception - unselect if not a suspected or confirmed emergency medical condition->Emergency Medical Condition (MA) FINDINGS: Lower Chest: There are moderate left and small right pleural effusions with some compression atelectasis in the left lower lobe. No definite acute airspace disease is seen at either lung base. The heart is top-normal in size. There are mild coronary artery calcifications which is a marker for coronary atherosclerotic disease. Organs: No obvious hepatic, splenic, pancreatic, adrenal or renal mass is seen.   A lesion could be missed in the absence of IV contrast.  There is a stable approximately 2 cm hypodensity in the posterior aspect of the lower pole of the right kidney, consistent with a cyst.  There is no evidence of hydronephrosis or renal calculi. The gallbladder is surgically absent. The liver shows slightly nodular contour that can be associated with cirrhosis. GI/Bowel: There is no evidence of bowel obstruction. No evidence of abnormal bowel wall thickening or distension. There is sigmoid diverticulosis without evidence of diverticulitis. Pelvis: Moderate to large amount of free fluid is seen in the pelvis. No pelvic mass or lymphadenopathy is seen. Peritoneum/Retroperitoneum: There is moderate to large amount of ascites. No evidence of retroperitoneal lymphadenopathy. There is no evidence of intraperitoneal lymphadenopathy. Prominent calcified plaque is seen in the abdominal aorta and iliac vessels with no evidence of aneurysm. The bilateral iliac stents are noted. Bones/Soft Tissues:  Age related degenerative changes of the visualized osseous structures without focal destructive lesion. There is diffuse subcutaneous edema, consistent with anasarca. 1. No evidence of bowel obstruction. 2. Moderate to large amount of ascites. Findings consistent with anasarca. 3. Moderate left and small right pleural effusions with mild left basilar atelectasis. 4. Slightly nodular contour of the liver that can be associated with cirrhosis although this is equivocal. 5. Sigmoid diverticulosis. No evidence of diverticulitis. XR CHEST PORTABLE    Result Date: 5/29/2021  EXAMINATION: ONE XRAY VIEW OF THE CHEST 5/29/2021 8:18 am COMPARISON: February 12, 2019 HISTORY: ORDERING SYSTEM PROVIDED HISTORY: Shortness of breath TECHNOLOGIST PROVIDED HISTORY: Reason for exam:->Shortness of breath FINDINGS: The heart has upper borderline size. There is some relative prominence of the central perihilar vessels. No acute infiltrates or consolidations are seen.   There is minimal blunting of the left-sided costophrenic sulcus which could represent minimal left-sided pleural effusion. Calcified granuloma likely seen in the right base. Borderline size heart. Slightly prominent perihilar vessels. The cannot exclude minimal left-sided pleural effusion. Assessment   Active Hospital Problems    Diagnosis     KEANU (acute kidney injury) ([facility] [address] [N17.9]      Priority: High    Ascites [R18.8]      Priority: Medium    Hyperkalemia [E87.5]      Priority: Medium    CKD (chronic kidney disease) stage 3, GFR 30-59 ml/min (HCC) [N18.30]      Priority: Medium    Bilateral carotid artery stenosis [I65.23]     Obesity (BMI 30-39. 9) [E66.9]     Hyperlipidemia LDL goal <100 [E78.5]     Atrial fibrillation, chronic (HCC) [I48.20]     HTN (hypertension), benign [I10]          Plan  · KEANU on CKD 3 with hyperkalemia: Baseline serum creatinine somewhere between 1.4 and 2.4. Follow BMP. Urine labs. Defer diuresis/IVF hydration to nephrology. Hold lisinopril, PO Lasix. Continue allopurinol/sodium bicarb. · Ascites with probable cirrhosis: SP diagnostic paracentesis on 6/15. GI consult for interpretation of ascitic fluid studies and cirrhosis w/u. Hepatitis panel. Check echo. · History of rectal cancer: General surgery following. · Macrocytic anemia: Noted Fe/Ferritin/TIBC/vitamin B12/folate. Follow H&H. Check stool for occult blood. · PT/OT  · Follow labs  · DVT prophylaxis. · Please see orders for further management and care. ·  for discharge planning  · Discharge plan: TBD pending clinical improvement     Electronically signed by Nataly Eisenberg DO on 6/16/2021 at 10:04 AM    I can be reached through [employer].

## 2021-06-16 NOTE — CONSULTS
[facility] MD Konrad Castros, MD Tomás Grant MD Nehemiah Spark, PRINCESS Echeverria, NATALIE  Consultation  6/15/2021    Thank you for consult  Full note dictated, to follow  Briefly, 78 y.o. gentleman known to me from outpatient practice for I follow him for chronic kidney disease, previously baseline creatinine 1.4 to 1.6 mg/dL, earlier this year is known to have creatinine between 2.1 (4/29) and 2.4 mg/dL (5/29). Presented to have diagnostic paracentesis per Dr. Ponce Diaz today after the recent development of ascites and peripheral swelling. Laboratory studies this morning demonstrated BUN/creatinine 109 and 4.6, respectively with serum potassium of 6.5 mmol/L (severely hemolyzed). No recent new medications. No lightheadedness, no chest pain or palpitations, no urinary hesitancy    A/R:  1. Acute kidney injury, likely hemodynamically mediated due to intravascular volume contraction associated with severe ascites and third spacing. Urinary indices are consistent with prerenal azotemia. Random urine protein creatinine ratio is 0.3 (consistent with roughly 300 mg/day of proteinuria, which is mild). 2.  Chronic kidney disease, baseline serum creatinine has worsened in the past year, and likely is 2.1 to 2.4 mg/dL. Etiology is diabetic nephropathy, renal microvascular atherosclerotic disease    3. Hyperkalemia, secondary to acute kidney injury, decreased effective circulating volume    4. Anasarca with considerable ascites. With 0.3 g/day estimated proteinuria, not nephrotic syndrome. Liver on CT scan does not look cirrhotic. Consider severe diastolic dysfunction versus LV dysfunction     5. Ascites. Status post diagnostic paracentesis this morning. Results pending    6. Anemia at least in part if not in full secondary to chronic kidney disease, though need to consider occult GI bleed.   No melena or hematochezia.       Insulin/D50/HCO3/calcium gluconate   Kayexalate 15 g x 1  NS gtt at 100 cc/h  Check renal ultrasound (no hydro nephrosis on CT 5/29/2021)   Consider 2D echocardiogram if not done recently  Check iron profile and ferritin, supplement as warranted  Follow labs, UO  Avoid nephrotoxins as able  Await results of diagnostic paracentesis        Larry Thomas MD, MD

## 2021-06-16 NOTE — PROGRESS NOTES
Physical Therapy    Facility/Department: [address] INTERNAL MEDICINE 2  Initial Assessment    NAME: Skylar Packer  : 1940  MRN: [id number]    Date of Service: 2021      Patient Diagnosis(es): There were no encounter diagnoses. has a past medical history of Ascites, Atrial fibrillation (HCC), Bilateral carotid artery stenosis, CKD (chronic kidney disease) stage 3, GFR 30-59 ml/min (HCC), Hyperlipidemia, Hypertension, and Obesity (BMI 30-39.9). has a past surgical history that includes Carotid endarterectomy; shoulder surgery (Bilateral); Finger trigger release (Bilateral); knee surgery (Right); Cholecystectomy; Carpal tunnel release (Bilateral); Upper gastrointestinal endoscopy (N/A, 2019); Colonoscopy; and Paracentesis (N/A, 6/15/2021). Evaluating Therapist: Ramsey Frias PT      Room #:  9349/4140-I  Diagnosis:  [facility] [N17.9]  PMHx/PSHx:  A fib, CKD  Precautions:  Falls, alarm      Social:  Pt lives with granddaughter in a first floor setup. Independent without device. Initial Evaluation  Date: 21 Treatment      Short Term/ Long Term   Goals   Was pt agreeable to Eval/treatment? yes     Does pt have pain? No c/o pain     Bed Mobility  Rolling: min assist  Supine to sit: min assist  Sit to supine: NT  Scooting: min assist  independent   Transfers Sit to stand: min assist  Stand to sit: min assist  Stand pivot: min assist  supervision   Ambulation    10 feet and 30 feet with no device, but pt holding IV pole with CGA  150 feet with AAD with supervision   Stair Negotiation  Ascended and descended  NT   4 steps with 1 rail with SBA   LE strength     3+/5    4-/5   balance      fair     AM-PAC Raw score               17/24         Pt is alert and Oriented   LE ROM: WFL  Sensation: intact  Edema: none  Endurance: fair  Chair alarm: yes     ASSESSMENT:    Pt displays functional ability as noted in the objective portion of this evaluation.       Patient medical information, gathering information on past medical history/social history and prior level of function, completion of standardized testing/informal observation of tasks, assessment of data and education on plan of care and goals.       CPT codes:  [x] Low Complexity PT evaluation 83998  [] Moderate Complexity PT evaluation 80624  [] High Complexity PT evaluation 74471  [] PT Re-evaluation 63952  [] Gait training 10738 minutes  [] Manual therapy 80855 minutes  [] Therapeutic activities 80090 minutes  [] Therapeutic exercises 43732 minutes  [] Neuromuscular reeducation 62081 minutes     Ceci Blanchard PT [id number]

## 2021-06-16 NOTE — PROGRESS NOTES
[facility] MD Konrad Castro Husbands, MD Tomás Grant MD Nehemiah Spark, PRINCESS Meadows, NATALIE  Progress Note    6/16/2021    SUBJECTIVE:   6/16: Feeling better. With edema better now that he had his lymphedema wraps in place. No dyspnea at rest on room air. Ongoing fatigue and generalized weakness. Continued abdominal distention, no pain. No cp/palp Appetite ok    PROBLEM LIST:    Principal Problem:    KEANU (acute kidney injury) ([facility] [address]  Active Problems:    Ascites    HTN (hypertension), benign    CKD (chronic kidney disease) stage 3, GFR 30-59 ml/min (HCC)    Atrial fibrillation, chronic (HCC)    Obesity (BMI 30-39. 9)    Hyperlipidemia LDL goal <100    Bilateral carotid artery stenosis    Hyperkalemia  Resolved Problems:    * No resolved hospital problems. *         DIET:    ADULT DIET; Regular;  No Added Salt (3-4 gm)     MEDS (scheduled):    sodium zirconium cyclosilicate  5 g Oral TID    sodium chloride flush  10 mL Intravenous 2 times per day    allopurinol  300 mg Oral Daily    amLODIPine  10 mg Oral Daily    hydrALAZINE  75 mg Oral TID    levothyroxine  25 mcg Oral Daily    hydrOXYzine  10 mg Oral Nightly    metoprolol succinate  50 mg Oral Daily    pantoprazole  40 mg Oral Daily    primidone  100 mg Oral BID    sodium bicarbonate  650 mg Oral BID    tamsulosin  0.4 mg Oral Daily    vitamin D  2,000 Units Oral Daily    apixaban  2.5 mg Oral BID       MEDS (infusions):   sodium chloride      sodium chloride 100 mL/hr at 06/16/21 1130       MEDS (prn):  sodium chloride flush, sodium chloride, ondansetron **OR** ondansetron, senna, perflutren lipid microspheres, dextrose    PHYSICAL EXAM:     Patient Vitals for the past 24 hrs:   BP Temp Temp src Pulse Resp SpO2 Weight   06/16/21 1457 (!) 158/71 98 °F (36.7 °C) Oral 86 -- 95 % --   06/16/21 1130 (!) 181/80 98.2 °F (36.8 °C) Oral 88 18 95 % --   06/15/21 2315 (!) 160/68 98 °F (36.7 °C) Oral 80 18 -- 242 lb 11.2 oz (110.1 kg)   @      Intake/Output Summary (Last 24 hours) at 6/16/2021 1735  Last data filed at 6/16/2021 1130  Gross per 24 hour   Intake 1280 ml   Output 400 ml   Net 880 ml         Wt Readings from Last 3 Encounters:   06/15/21 242 lb 11.2 oz (110.1 kg)   05/29/21 220 lb (99.8 kg)   03/03/21 225 lb (102.1 kg)       Constitutional:  in no acute distress  HEENT: NC/AT, EOMI, sclera and conjunctiva are clear and anicteric, mucus membranes moist  Neck: Trachea midline, no JVD  Cardiovascular: S1, S2 regular rhythm, no murmur,or rub  Respiratory:  No crackles, no wheeze  Gastrointestinal:  Soft, nontender, nondistended, NABS  Ext: no edema, feet warm  Skin: dry, no rash  Neuro: awake, alert, interactive      DATA:    Recent Labs     06/15/21  0945 06/16/21  0700   WBC 6.6 9.3   HGB 9.2* 9.8*   HCT 28.9* 31.4*   .6* 106.1*    154     Recent Labs     06/16/21  0115 06/16/21  0700 06/16/21  1415    143 142   K 5.7* 5.5* 5.3*   * 110* 111*   CO2 20* 21* 21*   MG  --  2.3  --    PHOS  --  4.1  --    * 102* 99*   CREATININE 4.3* 4.2* 4.1*   ALT  --  22  --    AST  --  42*  --    BILITOT  --  0.8  --    ALKPHOS  --  281*  --        Lab Results   Component Value Date    LABPROT 0.3 (H) 06/15/2021    LABPROT 0.3 06/15/2021    LABPROT 0.4 (H) 12/20/2019    LABPROT 0.4 12/20/2019       ASSESSMENT / RECOMMENDATIONS:    .  Acute kidney injury, likely hemodynamically mediated due to intravascular volume contraction associated with severe ascites and third spacing. Urinary indices are consistent with prerenal azotemia. Random urine protein creatinine ratio is 0.3 (consistent with roughly 300 mg/day of proteinuria, which is mild).     2. Chronic kidney disease, baseline serum creatinine has worsened in the past year, and likely is 2.1 to 2.4 mg/dL. Etiology is diabetic nephropathy, renal microvascular atherosclerotic disease     3.   Hyperkalemia, secondary to acute kidney injury, decreased effective circulating volume     4. Anasarca with considerable ascites. With 0.3 g/day estimated proteinuria, not nephrotic syndrome. Liver on CT scan does not look cirrhotic. Consider severe diastolic dysfunction versus LV dysfunction     5. Ascites. Status post diagnostic paracentesis this morning. Results pending     6. Anemia at least in part if not in full secondary to chronic kidney disease, though need to consider occult GI bleed.   No melena or hematochezia.       Azotemia improving on conservative-rate IV fluid resuscitation  Hyperkalemia, improved       Lokelma 3 times daily today, start daily tomorrow   NS gtt at 100 cc/h   Consider 2D echocardiogram if not done recently  Check iron profile and ferritin, supplement as warranted  Follow labs, UO  Avoid nephrotoxins as able  Await results of diagnostic paracentesis      Electronically signed by Estela Conway MD on 6/16/2021

## 2021-06-16 NOTE — PROGRESS NOTES
Occupational Therapy  OCCUPATIONAL THERAPY INITIAL EVALUATION    [facility] [address]  [place], [place]       Date:2021  Patient Name: Lizeth Hough  MRN: [id number]  : 1940  Room: [address] [place] [address]    Evaluating OT: Jonathan Hebert OTR/L [id number]    Referring Provider: Abdiaziz Rankin DO  Specific Provider Orders/Date: eval and treat 21    Diagnosis: KEANU. Pt presents to hospital d/t ascites.     Procedure: 6/15/21 paracentesis   Pertinent Medical History: HTN, ascites, afib, CKD     Precautions:  fall risk    Assessment of current deficits   [x] Functional mobility  [x]ADLs  [x] Strength               [x]Cognition   [x] Functional transfers   [x] IADLs         [x] Safety Awareness   [x]Endurance   [] Fine Coordination              [x] Balance      [] Vision/perception   []Sensation    []Gross Motor Coordination  [] ROM  [] Delirium                   [] Motor Control     OT PLAN OF CARE   OT POC based on physician orders, patient diagnosis and results of clinical assessment    Frequency/Duration 2-5 days/wk for 10-14 days PRN   Specific OT Treatment Interventions to include:   * Instruction/training on adapted ADL techniques and AE recommendations to increase functional independence within precautions       * Training on energy conservation strategies, correct breathing pattern and techniques to improve independence/tolerance for self-care routine  * Functional transfer/mobility training/DME recommendations for increased independence, safety, and fall prevention  * Patient/Family education to increase follow through with safety techniques and functional independence  * Recommendation of environmental modifications for increased safety with functional transfers/mobility and ADLs  * Therapeutic exercise to improve motor endurance, ROM, and functional strength for ADLs/functional transfers  * Therapeutic activities to facilitate/challenge dynamic balance, stand tolerance for increased safety and independence with ADLs    Recommended Adaptive Equipment: TBD     Home Living: Pt lives with granddaughter, who works during the day, in a 1st floor set up. Bathroom setup: walk in tub with seat, 3:1 over commode     Prior Level of Function: Independent with ADLs except granddaughter assist in socks and MITCH hose, Mod I with simple IADLs, granddaughter assists as needed; completed functional mobility with no AD. Has [employer].     Pain Level: no reported pain    Cognition: A&O: 4/4. Problem solving:  Fair   Judgement/safety:  Fair     Functional Assessment: AM-PAC Daily Activity Raw Score: 16/24   Initial Eval Status  Date: 6/16/21 Treatment session:  STGs=LTGS  Timeframe 10-14 days     Feeding Independent     Grooming Set up  Independent   UB Dressing Set up  Independent   LB Dressing Max A  Donning B socks  Min A    Bathing Mod A  SBA   Toileting Mod A  Mod I   Bed Mobility  Supine to sit: Min A  Increased time required     Functional Transfers STS: Min A  Mod I   Functional Mobility CGA with no AD  Small steps to armchair  Pt declines further d/t extreme fatigue/weakness  Mod I during ADLs   Balance Sitting: fair plus    Standing: fair minus HHA     Activity Tolerance poor  standing sofía x6-7 min with fair plus balance during self care tasks           comments: Pt reports increasingly fatigued post sitting in armchair this AM for 30 min. Pt does have good motivation and willingness to participate in assessment. Requests to try sitting up in chair again this PM, encouraged to attempt another 30 min with verbalized understanding from patient. Treatment: Patient educated on techniques for completion of ADL, safe functional transfers and functional mobility.   Patient required cues for follow through with proper hand/foot placement, pacing, safety, compensatory strategies, breathing and technique in bed mobility, functional transfers, functional mobility and LB dressing in preparation for maximum independence in all self care tasks. Hand Dominance: RIght   Strength ROM Additional Info:    RUE   4-/5 WFL good  and FMC/dexterity noted during ADL tasks     LUE 4-/5 WFL good  and FMC/dexterity noted during ADL tasks         Hearing: [place]  Vision: WFL   Sensation:  No c/o numbness or tingling   Tone: WFL   Edema: B LEs                            Rehab Potential: Good for established goals     Patient/Family Goal: To get home. Patient and/or family were instructed on functional diagnosis, prognosis/goals and OT plan of care. Pt verbalized understanding. Upon arrival, patient supine in bed. At end of session, patient seated in armchair with call light and phone within reach, all lines and tubes intact. Pt would benefit from continued skilled OT to increase safety and independence with completion of ADL/IADL tasks for functional independence and quality of life.  Bed/chair alarm: ON    Low Evaluation 26040  Time In: 1457   Time Out: 1511  Total: 14 min      Evaluation time includes thorough review of current medical information, gathering information on past medical history/social history and prior level of function, completion of standardized testing/informal observation of tasks, assessment of data, and development of POC/Goals    Rufino Gonzalez OTR/L  [id number]

## 2021-06-16 NOTE — CARE COORDINATION
6/16/2021 Social Work Discharge Planning: This worker met with Pt to discuss  role and transition of care/discharge planning. Pt resides with granddaughter at her home. Has an emergency response button, ww, shower chair and BSC. Awaiting therapy evals. Room air.  Electronically signed by PAUL Murray on 6/16/2021 at 11:09 AM

## 2021-06-16 NOTE — ANESTHESIA POSTPROCEDURE EVALUATION
Department of Anesthesiology  Postprocedure Note    Patient: Kisha Gottlieb  MRN: [id number]  YOB: 1940  Date of evaluation: 6/16/2021  Time:  8:41 AM     Procedure Summary     Date: 06/15/21 Room / Location: [facility] [place] [address] / [address] [place] [address]    Anesthesia Start: 5965 Anesthesia Stop: 1127    Procedure: ULTRASOUND GUIDED PARACENTESIS (N/A Abdomen) Diagnosis: (ASCITES)    Surgeons: Rene Galloway MD Responsible Provider: China Matamoros MD    Anesthesia Type: MAC ASA Status: 4          Anesthesia Type: MAC    Sajan Phase I: Sajan Score: 10    Sajan Phase II:      Last vitals: Reviewed and per EMR flowsheets.        Anesthesia Post Evaluation    Patient location during evaluation: PACU  Patient participation: complete - patient participated  Level of consciousness: awake and alert  Airway patency: patent  Nausea & Vomiting: no vomiting and no nausea  Complications: no  Cardiovascular status: hemodynamically stable  Respiratory status: acceptable  Hydration status: stable

## 2021-06-17 NOTE — CONSULTS
high-grade dysplasia and an EGD in 02/2019 with  findings of a duodenal ulcer, no reported esophageal varices. SOCIAL HISTORY:  He is . Former smoker. Seems like alcohol is  not an issue and I cannot tell whether it has been in the past.    ALLERGIES:  He listed no allergies. OBJECTIVE:  GENERAL:  He is an overweight gentleman, head of the bed elevated 30  degrees. He is alert but appears dyspneic. He is neither obviously  pale and he is not jaundiced. NECK:  I interpret his neck veins to be distended, query external  jugulars on the right side. Carotid scar on the left. I believe he can  see internal jugular pulsations to the angle of the jaw. LUNGS:  Breath sounds are diminished. HEART:  Rhythm is irregular. Murmur of mitral regurgitation at the  apex. I do not hear a gallop. ABDOMEN:  There is a right upper quadrant scar. There is ascites which  is intense. He has no palpable liver or spleen. EXTREMITIES:  He has compression dressings on the lower extremities. RECTAL:  Rectal exam was not appropriate for now. ASSESSMENT:  Reviewing the CAT scan or CTA of the chest from 03/2019, I  believe there was evidence of evolving right heart failure as there was  premature filling of the hepatic veins. It was not striking and it was  not mentioned by Radiology. He had no ascites at that time, but the  liver was clearly not cirrhotic in its appearance. His echocardiogram  however revealed diminished right ventricular systolic function at that  time in addition to atrial fibrillation, normal left ventricular  function, moderate pulmonary hypertension with an RSVP of 58 and mild  tricuspid regurgitation. The inferior vena cava at that time was  dilated with less than a 50% respiratory variation. He had mild mitral  regurgitation at that time. No aortic stenosis.     At this point again I think that it is more likely that he has a  substantial right heart failure than he does have liver disease. He has  risk for liver disease based on his history of obesity, but there is no  radiographic evidence of it now. I would dispute the radiologist's  interpretation of nodularity surface of the liver on the most current  CAT scan. The fluid analysis is consistent with cirrhosis or heart  failure as the etiology. He might benefit from a larger volume paracentesis; only a diagnostic  study was done. I will take the liberty of asking Dr. Owen Walsh he was  seen before to see him again.   He does need another echocardiogram.        Nam Son MD    D: 06/16/2021 18:42:45       T: 06/16/2021 18:50:57     [email or web address]  Job#: [id number]     Doc#: [id number]    CC:

## 2021-06-17 NOTE — PROGRESS NOTES
Dr Brooke Neff notified of pt's poor appetite   Granddaughter stated he doesn't eat much but will drink boost. Received order [id number] Luda Walters to order boost with meals.

## 2021-06-17 NOTE — PROGRESS NOTES
[facility] MD Billy Cummings MD Dereck Gelinas, MD Melbourne Ranger, MD Lelon Reilly, NATALIE Martinez  Progress Note    6/17/2021    SUBJECTIVE:   6/16: Feeling better. With edema better now that he had his lymphedema wraps in place. No dyspnea at rest on room air. Ongoing fatigue and generalized weakness. Continued abdominal distention, no pain. No cp/palp Appetite ok  6/17: \"Very tired\" did not sleep very well last night. No dyspnea at rest supine on room air. Substantial lower extremity edema persists lymphedema distally somewhat better, though abdominal girth worse than yesterday. PROBLEM LIST:    Principal Problem:    KEANU (acute kidney injury) ([facility] [address]  Active Problems:    Ascites    HTN (hypertension), benign    CKD (chronic kidney disease) stage 3, GFR 30-59 ml/min (HCC)    Atrial fibrillation, chronic (HCC)    Obesity (BMI 30-39. 9)    Hyperlipidemia LDL goal <100    Bilateral carotid artery stenosis    Hyperkalemia  Resolved Problems:    * No resolved hospital problems. *         DIET:    ADULT DIET; Regular;  No Added Salt (3-4 gm)     MEDS (scheduled):    sodium zirconium cyclosilicate  5 g Oral TID    ferric gluconate (FERRLECIT) IVPB  100 mg Intravenous Once    Followed by    ferric gluconate (FERRLECIT) IVPB  125 mg Intravenous Daily    sodium chloride flush  10 mL Intravenous 2 times per day    allopurinol  300 mg Oral Daily    amLODIPine  10 mg Oral Daily    hydrALAZINE  75 mg Oral TID    levothyroxine  25 mcg Oral Daily    hydrOXYzine  10 mg Oral Nightly    metoprolol succinate  50 mg Oral Daily    pantoprazole  40 mg Oral Daily    primidone  100 mg Oral BID    sodium bicarbonate  650 mg Oral BID    tamsulosin  0.4 mg Oral Daily    vitamin D  2,000 Units Oral Daily    apixaban  2.5 mg Oral BID       MEDS (infusions):   sodium chloride      sodium chloride 100 mL/hr at 06/16/21 1820       MEDS (prn):  sodium chloride flush, sodium chloride, ondansetron **OR** ondansetron, senna, perflutren lipid microspheres, dextrose    PHYSICAL EXAM:     Patient Vitals for the past 24 hrs:   BP Temp Temp src Pulse Resp SpO2 Weight   06/17/21 1400 134/66 98.2 °F (36.8 °C) Oral 77 16 95 % --   06/17/21 0830 (!) 175/81 98.7 °F (37.1 °C) Oral 88 18 96 % --   06/17/21 0330 (!) 149/76 98.6 °F (37 °C) Oral 84 18 -- --   06/16/21 2315 -- -- -- -- -- -- 243 lb 12.8 oz (110.6 kg)   06/16/21 2100 (!) 154/68 98.6 °F (37 °C) Oral 89 18 95 % --   06/16/21 1457 (!) 158/71 98 °F (36.7 °C) Oral 86 -- 95 % --   @      Intake/Output Summary (Last 24 hours) at 6/17/2021 1448  Last data filed at 6/17/2021 1220  Gross per 24 hour   Intake 360 ml   Output 550 ml   Net -190 ml         Wt Readings from Last 3 Encounters:   06/16/21 243 lb 12.8 oz (110.6 kg)   05/29/21 220 lb (99.8 kg)   03/03/21 225 lb (102.1 kg)       Constitutional:  in no acute distress  HEENT: NC/AT, EOMI, sclera and conjunctiva are clear and anicteric, mucus membranes moist  Neck: Trachea midline, no JVD  Cardiovascular: S1, S2 regular rhythm, no murmur,or rub  Respiratory:  No crackles, no wheeze  Gastrointestinal:  Soft, nontender, nondistended, NABS  Ext: no edema, feet warm  Skin: dry, no rash  Neuro: awake, alert, interactive      DATA:    Recent Labs     06/15/21  0945 06/16/21  0700 06/17/21  0650   WBC 6.6 9.3 6.3   HGB 9.2* 9.8* 8.9*   HCT 28.9* 31.4* 27.7*   .6* 106.1* 106.1*    154 128*     Recent Labs     06/16/21  0700 06/16/21  1415 06/17/21  0650    142 142   K 5.5* 5.3* 5.1*  5.1*   * 111* 110*   CO2 21* 21* 20*   MG 2.3  --  2.2   PHOS 4.1  --  3.9   * 99* 95*   CREATININE 4.2* 4.1* 3.8*   ALT 22  --  12   AST 42*  --  27   BILITOT 0.8  --  0.8   ALKPHOS 281*  --  229*       Lab Results   Component Value Date    LABPROT 0.3 (H) 06/15/2021    LABPROT 0.3 06/15/2021    LABPROT 0.4 (H) 12/20/2019    LABPROT 0.4 12/20/2019       ASSESSMENT / RECOMMENDATIONS:    .  Acute kidney injury, likely hemodynamically mediated due to intravascular volume contraction associated with severe ascites and third spacing. Urinary indices are consistent with prerenal azotemia. Random urine protein creatinine ratio is 0.3 (consistent with roughly 300 mg/day of proteinuria, which is mild).     2. Chronic kidney disease, baseline serum creatinine has worsened in the past year, and likely is 2.1 to 2.4 mg/dL. Etiology is diabetic nephropathy, renal microvascular atherosclerotic disease     3. Hyperkalemia, secondary to acute kidney injury, decreased effective circulating volume     4. Anasarca with considerable ascites. With 0.3 g/day estimated proteinuria, not nephrotic syndrome. Liver on CT scan does not look cirrhotic. Consider severe diastolic dysfunction versus LV dysfunction     5. Ascites. Status post diagnostic paracentesis this morning. Results pending     6. Anemia at least in part if not in full secondary to chronic kidney disease, though need to consider occult GI bleed.   No melena or hematochezia.       Azotemia improving on conservative-rate IV fluid resuscitation  Hyperkalemia, improved       Lokelma 3 times daily today, start daily tomorrow   Decrease NS gtttot 50 cc/h   Consider 2D echocardiogram if not done recently  Follow labs, UO  Avoid nephrotoxins as able  Await results of diagnostic paracentesis  No need to initiate hemodialysis acutely as azotemia is improving slowly, in the next 24 to 48 hours need to switch our focus to diuresis, on IV fluid as well as [place]      Electronically signed by Deana Bacon MD on 6/17/2021

## 2021-06-17 NOTE — CARE COORDINATION
6/17/2021  Social Work Discharge Planning: SW went to discuss AMPAC of 17/24 with Pt and LINDA vs C. Pt stated \"im not going anywhere, Im going to die here\". Sw offered encouragement and spent a good deal of time with Pt and informed him thats not going to happen and we all are trying to give him the best care here as well as when he discharges. Pt was teary eyed and stated that he really misses his wife who had passed years ago. After speaking to Pt, this worker called Pts granddaughter Jarod Michelle who is a SW for [employer]. Jarod Michelle is extremely supportive and Pt resides with she and her family. Jadsunday Gonzalezphrey would like to speak to Dr. Alana Ramirez as soon as he has time to call her. Jadsunday Miguel Angel also felt that at this time, Pt may do fine with returning home with out pt rehab and continue with his out pt lymph therapy unless there are skilled nursing needs that Pt would need that she cannot assist with. Jarod Michelle already assists with meds and all appts Pt has and cares for all his needs. If Pt would need [facility] she chose [facility]. SW made a tentative referral to [facility] with [facility]. SW will continue to follow. Pt is on room air and has all needed DME.  Electronically signed by PAUL Limon on 6/17/2021 at 10:21 AM

## 2021-06-17 NOTE — PROGRESS NOTES
Internal Medicine Progress Note    Patient's name: Anton Adams  : 1940  Chief complaints (on day of admission): Hyperkalemia  Admission date: 6/15/2021  Date of service: 2021   Room: [address] INTERNAL MEDICINE  Primary care physician: Preeti Pardo DO  Reason for visit: Follow-up for KEANU/hyperkalemia     Subjective  Cleo Crook was seen and examined. He was lying in bed resting comfortably. His breathing is stable. His edema of his legs is slightly worse. Denies any other new complaints or issues at this time. Review of Systems  There are no new complaints of chest pain, shortness of breath, abdominal pain, nausea, vomiting, diarrhea, constipation.     Hospital Medications  Current Facility-Administered Medications   Medication Dose Route Frequency Provider Last Rate Last Admin    sodium zirconium cyclosilicate (LOKELMA) oral suspension 5 g  5 g Oral TID Gay Ca MD   5 g at 21    ferric gluconate (FERRLECIT) 100 mg in sodium chloride 0.9 % 100 mL IVPB  100 mg Intravenous Once Gay Ca MD        Followed by   [facility] ferric gluconate (FERRLECIT) 125 mg in sodium chloride 0.9 % 100 mL IVPB  125 mg Intravenous Daily Gay Ca MD        sodium chloride flush 0.9 % injection 10 mL  10 mL Intravenous 2 times per day Abhi Hodges, DO   10 mL at 21 0848    sodium chloride flush 0.9 % injection 10 mL  10 mL Intravenous PRN Abhi Hodges DO        0.9 % sodium chloride infusion  25 mL Intravenous PRN Abhi Hodges DO        ondansetron (ZOFRAN-ODT) disintegrating tablet 4 mg  4 mg Oral Q8H PRN Abhi Hodges DO        Or    ondansetron (ZOFRAN) injection 4 mg  4 mg Intravenous Q6H PRN Abhi Hodges DO        senna (SENOKOT) tablet 8.6 mg  1 tablet Oral Daily PRN Abhi Hodges DO        allopurinol (ZYLOPRIM) tablet 300 mg  300 mg Oral Daily Abhi Hodges, DO   300 mg at 21 0853    amLODIPine (NORVASC) tablet 10 mg  10 mg Oral Daily Abhi MEMO Volino, DO   10 mg at 06/17/21 0847    hydrALAZINE (APRESOLINE) tablet 75 mg  75 mg Oral TID Abhi Hodges DO   75 mg at 06/17/21 0846    levothyroxine (SYNTHROID) tablet 25 mcg  25 mcg Oral Daily Abhi Hodges DO   25 mcg at 06/17/21 0648    hydrOXYzine (ATARAX) tablet 10 mg  10 mg Oral Nightly Abhi Hodges DO   10 mg at 06/16/21 2110    metoprolol succinate (TOPROL XL) extended release tablet 50 mg  50 mg Oral Daily Abhi Hodges DO   50 mg at 06/17/21 0847    pantoprazole (PROTONIX) tablet 40 mg  40 mg Oral Daily Abhi Hodges DO   40 mg at 06/17/21 0847    primidone (MYSOLINE) tablet 100 mg  100 mg Oral BID Abhi Hodges DO   100 mg at 06/17/21 0847    sodium bicarbonate tablet 650 mg  650 mg Oral BID Abhi Hodges DO   650 mg at 06/17/21 0847    tamsulosin (FLOMAX) capsule 0.4 mg  0.4 mg Oral Daily Abhi Hodges DO   0.4 mg at 06/17/21 0847    vitamin D (CHOLECALCIFEROL) tablet 2,000 Units  2,000 Units Oral Daily Abhi Hodges DO   2,000 Units at 06/17/21 0847    perflutren lipid microspheres (DEFINITY) injection 1.65 mg  1.5 mL Intravenous ONCE PRN Abhi Hodges DO        apixaban (ELIQUIS) tablet 2.5 mg  2.5 mg Oral BID Abhi Hodges DO   2.5 mg at 06/17/21 0648    0.9 % sodium chloride infusion   Intravenous Continuous David Lazo  mL/hr at 06/16/21 1820 New Bag at 06/16/21 1820    dextrose 50 % IV solution  25 g Intravenous PRN David Lazo MD           PRN Medications  sodium chloride flush, sodium chloride, ondansetron **OR** ondansetron, senna, perflutren lipid microspheres, dextrose    Objective  Most Recent Recorded Vitals  BP (!) 175/81   Pulse 88   Temp 98.7 °F (37.1 °C) (Oral)   Resp 18   Ht 5' 9\" (1.753 m)   Wt 243 lb 12.8 oz (110.6 kg)   SpO2 96%   BMI 36.00 kg/m²   I/O last 3 completed shifts: In: 4193 [P.O.:240; I.V.:800]  Out: 950 [Urine:950]  No intake/output data recorded.     Physical Exam:  General: AAO to person/place/time/purpose, NAD, no labored breathing, flat affect  Eyes: conjunctivae/corneas clear, sclera non icteric  Skin: color/texture/turgor normal, no rashes or lesions  Lungs: CTAB, no retractions/use of accessory muscles, no vocal fremitus, no rhonchi, no crackle, no rales  Heart: regular rate, regular rhythm, systolic murmur  Abdomen: distended with fluid wave soft, NT, bowel sounds normal  Extremities: atraumatic, LE edema slightly worse than yesterday  Neurologic: cranial nerves 2-12 grossly intact, no slurred speech    Most Recent Labs  Lab Results   Component Value Date    WBC 6.3 06/17/2021    HGB 8.9 (L) 06/17/2021    HCT 27.7 (L) 06/17/2021     (L) 06/17/2021     06/17/2021    K 5.1 (H) 06/17/2021    K 5.1 (H) 06/17/2021     (H) 06/17/2021    CREATININE 3.8 (H) 06/17/2021    BUN 95 (H) 06/17/2021    CO2 20 (L) 06/17/2021    GLUCOSE 94 06/17/2021    ALT 12 06/17/2021    AST 27 06/17/2021    INR 1.2 06/15/2021    TSH 0.728 12/17/2014       US RETROPERITONEAL COMPLETE   Final Result   No sonographic evidence of acute renal pathology, mass, or calculus. Abdomen and pelvic ascites           CT ABDOMEN PELVIS WO CONTRAST Additional Contrast? None    Result Date: 5/29/2021  EXAMINATION: CT OF THE ABDOMEN AND PELVIS WITHOUT CONTRAST 5/29/2021 8:18 am TECHNIQUE: CT of the abdomen and pelvis was performed without the administration of intravenous contrast. Multiplanar reformatted images are provided for review. Dose modulation, iterative reconstruction, and/or weight based adjustment of the mA/kV was utilized to reduce the radiation dose to as low as reasonably achievable.  COMPARISON: CT abdomen and pelvis dated 12/19/2019 HISTORY: ORDERING SYSTEM PROVIDED HISTORY: Abdominal distention TECHNOLOGIST PROVIDED HISTORY: Reason for exam:->Abdominal distention Additional Contrast?->None Decision Support Exception - unselect if not a suspected or confirmed emergency medical condition->Emergency Medical Condition (MA) FINDINGS: Lower Chest: There are moderate left and small right pleural effusions with some compression atelectasis in the left lower lobe. No definite acute airspace disease is seen at either lung base. The heart is top-normal in size. There are mild coronary artery calcifications which is a marker for coronary atherosclerotic disease. Organs: No obvious hepatic, splenic, pancreatic, adrenal or renal mass is seen. A lesion could be missed in the absence of IV contrast.  There is a stable approximately 2 cm hypodensity in the posterior aspect of the lower pole of the right kidney, consistent with a cyst.  There is no evidence of hydronephrosis or renal calculi. The gallbladder is surgically absent. The liver shows slightly nodular contour that can be associated with cirrhosis. GI/Bowel: There is no evidence of bowel obstruction. No evidence of abnormal bowel wall thickening or distension. There is sigmoid diverticulosis without evidence of diverticulitis. Pelvis: Moderate to large amount of free fluid is seen in the pelvis. No pelvic mass or lymphadenopathy is seen. Peritoneum/Retroperitoneum: There is moderate to large amount of ascites. No evidence of retroperitoneal lymphadenopathy. There is no evidence of intraperitoneal lymphadenopathy. Prominent calcified plaque is seen in the abdominal aorta and iliac vessels with no evidence of aneurysm. The bilateral iliac stents are noted. Bones/Soft Tissues:  Age related degenerative changes of the visualized osseous structures without focal destructive lesion. There is diffuse subcutaneous edema, consistent with anasarca. 1. No evidence of bowel obstruction. 2. Moderate to large amount of ascites. Findings consistent with anasarca. 3. Moderate left and small right pleural effusions with mild left basilar atelectasis.  4. Slightly nodular contour of the liver that can be associated with cirrhosis although this is equivocal. 5. Sigmoid diverticulosis. No evidence of diverticulitis. XR CHEST PORTABLE    Result Date: 5/29/2021  EXAMINATION: ONE XRAY VIEW OF THE CHEST 5/29/2021 8:18 am COMPARISON: February 12, 2019 HISTORY: ORDERING SYSTEM PROVIDED HISTORY: Shortness of breath TECHNOLOGIST PROVIDED HISTORY: Reason for exam:->Shortness of breath FINDINGS: The heart has upper borderline size. There is some relative prominence of the central perihilar vessels. No acute infiltrates or consolidations are seen. There is minimal blunting of the left-sided costophrenic sulcus which could represent minimal left-sided pleural effusion. Calcified granuloma likely seen in the right base. Borderline size heart. Slightly prominent perihilar vessels. The cannot exclude minimal left-sided pleural effusion. Assessment   Active Hospital Problems    Diagnosis     KEANU (acute kidney injury) ([facility] [address] [N17.9]      Priority: High    Ascites [R18.8]      Priority: Medium    Hyperkalemia [E87.5]      Priority: Medium    CKD (chronic kidney disease) stage 3, GFR 30-59 ml/min (HCC) [N18.30]      Priority: Medium    Bilateral carotid artery stenosis [I65.23]     Obesity (BMI 30-39. 9) [E66.9]     Hyperlipidemia LDL goal <100 [E78.5]     Atrial fibrillation, chronic (HCC) [I48.20]     HTN (hypertension), benign [I10]          Plan  · KEANU on CKD 3 with hyperkalemia: Baseline serum creatinine somewhere between 1.4 and 2.4. Follow BMP. Urine labs. Defer diuresis/IVF hydration to nephrology. Hold lisinopril, PO Lasix. Continue allopurinol/sodium bicarb. · Ascites with probable RHF: SP diagnostic paracentesis on 6/15. GI following for interpretation of ascitic fluid studies and cirrhosis w/u. Hepatitis panel negative. Pending echo and cardio consult. · History of rectal cancer: General surgery following. · Macrocytic anemia: Noted Fe/Ferritin/TIBC/vitamin B12/folate. Follow H&H. Pending stool for occult blood.   · PT AM-PAC-- 17/24  · Follow labs  · DVT prophylaxis. · Please see orders for further management and care. ·  for discharge planning  · Discharge plan: TBD pending clinical improvement     Electronically signed by Katie Miles DO on 6/17/2021 at 10:11 AM    I can be reached through [employer].

## 2021-06-18 NOTE — PROGRESS NOTES
[facility] MD Sharda Schmitz MD Myrtha Shepherd, MD Aaron Sia, MD Lorelie Beard, PRINCESS [place], NATALIE  Progress Note    6/18/2021    SUBJECTIVE:   6/16: Feeling better. With edema better now that he had his lymphedema wraps in place. No dyspnea at rest on room air. Ongoing fatigue and generalized weakness. Continued abdominal distention, no pain. No cp/palp Appetite ok  6/17: \"Very tired\" did not sleep very well last night. No dyspnea at rest supine on room air. Substantial lower extremity edema persists lymphedema distally somewhat better, though abdominal girth worse than yesterday. 6/18 : seen today on RA , weak , LE swelling noted . PROBLEM LIST:    Principal Problem:    KEANU (acute kidney injury) ([facility] [address]  Active Problems:    Ascites    HTN (hypertension), benign    CKD (chronic kidney disease) stage 3, GFR 30-59 ml/min (HCC)    Atrial fibrillation, chronic (HCC)    Obesity (BMI 30-39. 9)    Hyperlipidemia LDL goal <100    Bilateral carotid artery stenosis    Hyperkalemia  Resolved Problems:    * No resolved hospital problems. *         DIET:    ADULT DIET; Regular;  No Added Salt (3-4 gm)  Adult Oral Nutrition Supplement; Protein Modular     MEDS (scheduled):    [START ON 6/19/2021] amLODIPine  5 mg Oral Daily    sodium zirconium cyclosilicate  5 g Oral TID    ferric gluconate (FERRLECIT) IVPB  100 mg Intravenous Once    Followed by    ferric gluconate (FERRLECIT) IVPB  125 mg Intravenous Daily    sodium chloride flush  10 mL Intravenous 2 times per day    allopurinol  300 mg Oral Daily    hydrALAZINE  75 mg Oral TID    levothyroxine  25 mcg Oral Daily    hydrOXYzine  10 mg Oral Nightly    metoprolol succinate  50 mg Oral Daily    pantoprazole  40 mg Oral Daily    primidone  100 mg Oral BID    sodium bicarbonate  650 mg Oral BID    tamsulosin  0.4 mg Oral Daily    vitamin D  2,000 Units Oral Daily       MEDS (infusions):   sodium chloride         MEDS (prn):  sodium chloride flush, sodium chloride, ondansetron **OR** ondansetron, senna, perflutren lipid microspheres, dextrose    PHYSICAL EXAM:     Patient Vitals for the past 24 hrs:   BP Temp Temp src Pulse Resp SpO2 Weight   06/18/21 1315 (!) 135/52 97.5 °F (36.4 °C) Oral 77 18 95 % --   06/18/21 0800 (!) 163/81 97.8 °F (36.6 °C) Infrared 86 18 94 % --   06/18/21 0541 -- -- -- -- -- -- 243 lb 12.8 oz (110.6 kg)   06/18/21 0017 131/63 98.5 °F (36.9 °C) Oral 81 20 93 % --   06/17/21 2113 (!) 161/76 98.4 °F (36.9 °C) Oral 87 20 94 % --   @      Intake/Output Summary (Last 24 hours) at 6/18/2021 1621  Last data filed at 6/18/2021 1307  Gross per 24 hour   Intake 390 ml   Output 1150 ml   Net -760 ml         Wt Readings from Last 3 Encounters:   06/18/21 243 lb 12.8 oz (110.6 kg)   05/29/21 220 lb (99.8 kg)   03/03/21 225 lb (102.1 kg)       Constitutional:  in no acute distress  HEENT: NC/AT, EOMI, sclera and conjunctiva are clear and anicteric, mucus membranes moist  Neck: Trachea midline, no JVD  Cardiovascular: S1, S2 regular rhythm, no murmur,or rub  Respiratory:  No crackles, no wheeze  Gastrointestinal:  Soft, nontender, nondistended, NABS  Ext: no edema, feet warm  Skin: dry, no rash  Neuro: awake, alert, interactive      DATA:    Recent Labs     06/16/21  0700 06/17/21  0650 06/18/21  0430   WBC 9.3 6.3 6.7   HGB 9.8* 8.9* 8.9*   HCT 31.4* 27.7* 26.9*   .1* 106.1* 105.5*    128* 125*     Recent Labs     06/16/21  0700 06/16/21  1415 06/17/21  0650 06/18/21  0430    142 142 142   K 5.5* 5.3* 5.1*  5.1* 5.1*   * 111* 110* 110*   CO2 21* 21* 20* 22   MG 2.3  --  2.2  --    PHOS 4.1  --  3.9 4.4   * 99* 95* 99*   CREATININE 4.2* 4.1* 3.8* 3.7*   ALT 22  --  12 10   AST 42*  --  27 28   BILITOT 0.8  --  0.8 0.8   ALKPHOS 281*  --  229* 244*       Lab Results   Component Value Date    LABPROT 0.3 (H) 06/15/2021    LABPROT 0.3 06/15/2021    LABPROT 0.4 (H) 12/20/2019    LABPROT 0.4 12/20/2019       ASSESSMENT / RECOMMENDATIONS:    .  Acute kidney injury, likely hemodynamically mediated due to intravascular volume contraction associated with severe ascites and third spacing. Urinary indices are consistent with prerenal azotemia. Random urine protein creatinine ratio is 0.3 (consistent with roughly 300 mg/day of proteinuria, which is mild).     2. Chronic kidney disease, baseline serum creatinine has worsened in the past year, and likely is 2.1 to 2.4 mg/dL. Etiology is diabetic nephropathy, renal microvascular atherosclerotic disease     3. Hyperkalemia, secondary to acute kidney injury, decreased effective circulating volume     4. Anasarca with considerable ascites. With 0.3 g/day estimated proteinuria, not nephrotic syndrome. Liver on CT scan does not look cirrhotic. Consider severe diastolic dysfunction versus LV dysfunction     5. Ascites. Status post diagnostic paracentesis this morning. Results pending     6. Anemia at least in part if not in full secondary to chronic kidney disease, though need to consider occult GI bleed.   No melena or hematochezia.              continue lokelma   Stop iv fluids   Hold diuresis today , consider initiating next 12-24 hours   Cut norvasac to 5 mg daily (might help his LE swelling )   Am labs      Electronically signed by Preeti Armstrong MD on 6/18/2021

## 2021-06-18 NOTE — PROGRESS NOTES
IR request presented to Dr. Mu Jain. Due to pt receiving eliquis this morning, procedure placed on schedule for Monday. Call to floor nurse, Shoaib [place], to update, verified with her to hold blood thinners until after procedure if OK with ordering provider.

## 2021-06-18 NOTE — PROGRESS NOTES
PROGRESS NOTE       PATIENT PROBLEM LIST:  Principal Problem:    KEANU (acute kidney injury) ([facility] [address]  Active Problems:    Ascites    HTN (hypertension), benign    CKD (chronic kidney disease) stage 3, GFR 30-59 ml/min (HCC)    Atrial fibrillation, chronic (HCC)    Obesity (BMI 30-39. 9)    Hyperlipidemia LDL goal <100    Bilateral carotid artery stenosis    Hyperkalemia  Resolved Problems:    * No resolved hospital problems. *      SUBJECTIVE:  CataÃ±o López states ***    REVIEW OF SYSTEMS:  General ROS: negative for - fatigue, malaise,  weight gain or weight loss  Psychological ROS: negative for - anxiety , depression  Ophthalmic ROS: negative for - decreased vision or visual distortion. ENT ROS: negative  Allergy and Immunology ROS: negative  Hematological and Lymphatic ROS: negative  Endocrine: no heat or cold intolerance and no polyphagia, polydipsia, or polyuria  Respiratory ROS: positive for - {rosresp symptoms:161438}  Cardiovascular ROS: positive for - {roscv symptoms:440993}. Gastrointestinal ROS: no abdominal pain, change in bowel habits, or black or bloody stools  Genito-Urinary ROS: no nocturia, dysuria, trouble voiding, frequency or hematuria  Musculoskeletal ROS: negative for- myalgias, arthralgias, or claudication  Neurological ROS: no TIA or stroke symptoms otherwise no significant change in symptoms or problems since yesterday as documented in previous progress notes.     SCHEDULED MEDICATIONS:   sodium zirconium cyclosilicate  5 g Oral TID    ferric gluconate (FERRLECIT) IVPB  100 mg Intravenous Once    Followed by    ferric gluconate (FERRLECIT) IVPB  125 mg Intravenous Daily    sodium chloride flush  10 mL Intravenous 2 times per day    allopurinol  300 mg Oral Daily    amLODIPine  10 mg Oral Daily    hydrALAZINE  75 mg Oral TID    levothyroxine  25 mcg Oral Daily    hydrOXYzine  10 mg Oral Nightly    metoprolol succinate  50 mg Oral Daily    pantoprazole  40 mg Oral Daily    primidone 100 mg Oral BID    sodium bicarbonate  650 mg Oral BID    tamsulosin  0.4 mg Oral Daily    vitamin D  2,000 Units Oral Daily    apixaban  2.5 mg Oral BID       VITAL SIGNS:                                                                                                                          BP (!) 163/81   Pulse 86   Temp 97.8 °F (36.6 °C) (Infrared)   Resp 18   Ht 5' 9\" (1.753 m)   Wt 243 lb 12.8 oz (110.6 kg)   SpO2 94%   BMI 36.00 kg/m²   Patient Vitals for the past 96 hrs (Last 3 readings):   Weight   06/18/21 0541 243 lb 12.8 oz (110.6 kg)   06/16/21 2315 243 lb 12.8 oz (110.6 kg)   06/15/21 2315 242 lb 11.2 oz (110.1 kg)     OBJECTIVE:    HEENT: PERRL, EOM  Intact; sclera non-icteric, conjunctiva pink. Carotids are brisk in upstroke with normal contour. No carotid bruits. Normal jugular venous pulsation at 45°. No palpable cervical nor supraclavicular nodes. Thyroid not palpable. Trachea midline. Chest: Even excursion  Lungs: CTA B, no expiratory wheezes or rhonchi, no decreased tactile fremitus without inspiratory rales. Heart: Regular  rhythm; S1 > S2, no gallop or murmur. No clicks, rub, palpable thrills   or heaves. PMI nondisplaced, 5th intercostal space MCL. Abdomen: Soft, nontender, nondistended,  {Blank single:00292::\"scaphoid\",\"mildly protuberant\",\"moderately protuberant\",\"grossly protuberant\"}, no masses or organomegaly. Bowel sounds active. Extremities: Without clubbing, cyanosis or edema. Pulses present 3+ upper extermities bilaterally; {POSITIVE-NEGATIVE PULSES:31942} DP and {POSITIVE-NEGATIVE PULSES:33074} PT bilaterally.      Data:   Scheduled Meds: Reviewed  Continuous Infusions:    sodium chloride      sodium chloride 100 mL/hr at 06/17/21 1655       Intake/Output Summary (Last 24 hours) at 6/18/2021 1110  Last data filed at 6/18/2021 1108  Gross per 24 hour   Intake 390 ml   Output 1150 ml   Net -760 ml     CBC:   Recent Labs     06/16/21  0700 06/17/21  0650 06/18/21  1316 WBC 9.3 6.3 6.7   HGB 9.8* 8.9* 8.9*   HCT 31.4* 27.7* 26.9*    128* 125*     BMP:  Recent Labs     06/15/21  1920 06/16/21  0115 06/16/21  0700 06/16/21  1415 06/17/21  0650 06/18/21  0430    144 143 142 142 142   K 6.0* 5.7* 5.5* 5.3* 5.1*  5.1* 5.1*   * 111* 110* 111* 110* 110*   CO2 21* 20* 21* 21* 20* 22   * 107* 102* 99* 95* 99*   CREATININE 4.5* 4.3* 4.2* 4.1* 3.8* 3.7*   LABGLOM 13 13 14 14 15 16     ABGs: No results found for: PH, PO2, PCO2  INR:   Recent Labs     06/15/21  1353   INR 1.2     PRO-BNP:   Lab Results   Component Value Date    PROBNP 17,337 (H) 04/29/2021    PROBNP 8,244 (H) 02/12/2019      TSH:   Lab Results   Component Value Date    TSH 0.728 12/17/2014      Cardiac Injury Profile: No results for input(s): CKTOTAL, CKMB, TROPONINI in the last 72 hours. Lipid Profile:   Lab Results   Component Value Date    TRIG 87 12/18/2014    HDL 33 12/18/2014    LDLCALC 60 12/18/2014    CHOL 111 12/18/2014      Hemoglobin A1C: No components found for: HGBA1C     RAD:   Echo Complete    Result Date: 6/17/2021  Transthoracic Echocardiography Report (TTE)  Demographics   Patient Name        Beka Reyes  Gender               Male                      T   Medical Record      [id number]       Room Number          0410  Number   Account #           [de-identified]      Procedure Date       06/16/2021   Corporate ID                       Ordering Physician   Chetna Pelayo   Accession Number    [id number]     Referring Physician  Rodriguez Pérez   Date of Birth       1940     Sonographer          Ronel Dacosta [facility]   Age                 78 year(s)     Interpreting         Edis Agudelo DO                                     Physician                                      Any Other  Procedure Type of Study   TTE procedure:Echo Complete W/Doppler & Color Flow.   Procedure Date Date: 06/16/2021 Start: 01:04 PM Study Location: Portable Technical Quality: Limited visualization due to patient immobility. Indications:Congestive heart failure. Patient Status: Routine Height: 69 inches Weight: 242 pounds BSA: 2.24 m^2 BMI: 35.74 kg/m^2 HR: 77 bpm BP: 160/68 mmHg  Findings   Left Ventricle  Left ventricle grossly normal in size. Early paradoxical septal motion. Normal left ventricular wall thickness. Doppler/Tissue doppler not obtained. Estimated left ventricular ejection fraction is 45±5%. Right Ventricle  Moderately dilated right ventricle. TAPSE is decreased  Right ventricle global systolic function is moderately reduced . Left Atrium  The left atrium is moderately dilated. The LAESV Index is >34 ml/m2. Interatrial septum appears intact. Right Atrium  Mild-moderately enlarged right atrium. Mitral Valve  Structurally normal mitral valve. Physiologic and/or trace mitral regurgitation is present. Tricuspid Valve  The tricuspid valve appears structurally normal.  Moderate to severe tricuspid regurgitation. Pulmonary hypertension is severe . RVSP is >69 mmHg. Pulmonary hypertension is severe . Aortic Valve  The aortic valve is trileaflet. Decreased aortic valve leaflet excursion. The aortic valve appears moderately sclerotic. Mild aortic stenosis is present. Pulmonic Valve  Pulmonic valve is structurally normal.  Mild pulmonic regurgitation present. Pericardial Effusion  No evidence of pericardial thickening/calcification present. No evidence of pericardial effusion. Aorta  Aortic root dimension within normal limits. Ascending aorta appears mildly sclerotic and/or calcified. Miscellaneous  Dilated Inferior Vena Cava. Inferior Vena Cava, no respiratory variation. Conclusions   Summary  Left ventricle grossly normal in size. Early paradoxical septal motion. Normal left ventricular wall thickness. Doppler/Tissue doppler not obtained. Estimated left ventricular ejection fraction is 45±5%. The LAESV Index is >34 ml/m2. Moderately dilated right ventricle.   TAPSE is decreased  Right ventricle global systolic function is moderately reduced . Physiologic and/or trace mitral regurgitation is present. The aortic valve appears moderately sclerotic. Mild aortic stenosis is present. Moderate to severe tricuspid regurgitation. Pulmonary hypertension is severe . RVSP is >69 mmHg. Pulmonary hypertension is severe . Mild pulmonic regurgitation present. Technically good quality study. Technically difficult study due to patient''s body habitus. Compared to prior echo, changes noted. Suggest clinical correlation.    Signature   ----------------------------------------------------------------  Electronically signed by Crow Laughlin DO(Interpreting  physician) on 06/17/2021 03:35 PM  ----------------------------------------------------------------  M-Mode/2D Measurements & Calculations   LV Diastolic    LV Systolic Dimension: 3.5   AV Cusp Separation: 1.2 cmLA  Dimension: 4.6  cm                           Dimension: 5.8 cmAO Root  cm              LV Volume Diastolic: 83.1 ml Dimension: 3.1 cm  LV FS:23.9 %    LV Volume Systolic: 86.3 ml  LV PW           LV EDV/LV EDV Index: 65.7  Diastolic: 1.1  PS/05 AV/F^9SO ESV/LV ESV  cm              Index: 51.2 ml/23ml/ m^2     RV Diastolic Dimension: 4.7  LV PW Systolic: EF Calculated: 08.9 %        cm  1.2 cm          LV Mass Index: 81 l/min*m^2  Septum                                       LA/Aorta: 1.88  Diastolic: 1.1                               Ascending Aorta: 2.9 cm  cm              LVOT: 2 cm                   LA volume/Index: 114 ml  Septum                                       /00WZ/I^1  Systolic: 1.2                                RA Area: 28 cm^2  cm  CO: 5.2 l/min                                IVC Expiration: 3 cm  CI: 2.32  l/m*m^2  LV Mass: 181.22  g  Doppler Measurements & Calculations   MV Peak E-Wave: 1.31 AV Peak Velocity:     LVOT Peak Velocity: 1.08 m/s  m/s                  2.09 m/s              LVOT Mean Velocity: 0.78 m/s                       AV Peak Gradient:     LVOT Peak Gradient: 4.6  MV Peak Gradient:    17.39 mmHg            mmHgLVOT Mean Gradient: 2.5  6.8 mmHg             AV Mean Velocity:     mmHg  MV Mean Gradient:    1.48 m/s              Estimated RVSP: 69.4 mmHg  2.9 mmHg             AV Mean Gradient:     Estimated RAP:15 mmHg  MV Mean Velocity:    10.2 mmHg  0.77 m/s             AV VTI: 44.3 cm  MV Deceleration      AV Area               TR Velocity:3.69 m/s  Time: 219.7 msec     (Continuity):1.52     TR Gradient:54.43 mmHg  MV P1/2t: 62.8 msec  cm^2                  PV Peak Velocity: 0.95 m/s  MVA by PHT:3.5 cm^2                        PV Peak Gradient: 3.57 mmHg  MV Area              LVOT VTI: 21.5 cm     PV Mean Velocity: 0.61 m/s  (continuity): 3.6                          PV Mean Gradient: 1.8 mmHg  cm^2                 Estimated PASP: 69.43  MV E' Septal         mmHg  Velocity: 0.08 m/s  MV E' Lateral  Velocity: 14 m/s  [email or web address] [id number]    CT ABDOMEN PELVIS WO CONTRAST Additional Contrast? None    Result Date: 5/29/2021  EXAMINATION: CT OF THE ABDOMEN AND PELVIS WITHOUT CONTRAST 5/29/2021 8:18 am TECHNIQUE: CT of the abdomen and pelvis was performed without the administration of intravenous contrast. Multiplanar reformatted images are provided for review. Dose modulation, iterative reconstruction, and/or weight based adjustment of the mA/kV was utilized to reduce the radiation dose to as low as reasonably achievable.  COMPARISON: CT abdomen and pelvis dated 12/19/2019 HISTORY: ORDERING SYSTEM PROVIDED HISTORY: Abdominal distention TECHNOLOGIST PROVIDED HISTORY: Reason for exam:->Abdominal distention Additional Contrast?->None Decision Support Exception - unselect if not a suspected or confirmed emergency medical condition->Emergency Medical Condition (MA) FINDINGS: Lower Chest: There are moderate left and small right pleural effusions with some compression atelectasis in the left lower lobe. No definite acute airspace disease is seen at either lung base. The heart is top-normal in size. There are mild coronary artery calcifications which is a marker for coronary atherosclerotic disease. Organs: No obvious hepatic, splenic, pancreatic, adrenal or renal mass is seen. A lesion could be missed in the absence of IV contrast.  There is a stable approximately 2 cm hypodensity in the posterior aspect of the lower pole of the right kidney, consistent with a cyst.  There is no evidence of hydronephrosis or renal calculi. The gallbladder is surgically absent. The liver shows slightly nodular contour that can be associated with cirrhosis. GI/Bowel: There is no evidence of bowel obstruction. No evidence of abnormal bowel wall thickening or distension. There is sigmoid diverticulosis without evidence of diverticulitis. Pelvis: Moderate to large amount of free fluid is seen in the pelvis. No pelvic mass or lymphadenopathy is seen. Peritoneum/Retroperitoneum: There is moderate to large amount of ascites. No evidence of retroperitoneal lymphadenopathy. There is no evidence of intraperitoneal lymphadenopathy. Prominent calcified plaque is seen in the abdominal aorta and iliac vessels with no evidence of aneurysm. The bilateral iliac stents are noted. Bones/Soft Tissues:  Age related degenerative changes of the visualized osseous structures without focal destructive lesion. There is diffuse subcutaneous edema, consistent with anasarca. 1. No evidence of bowel obstruction. 2. Moderate to large amount of ascites. Findings consistent with anasarca. 3. Moderate left and small right pleural effusions with mild left basilar atelectasis. 4. Slightly nodular contour of the liver that can be associated with cirrhosis although this is equivocal. 5. Sigmoid diverticulosis. No evidence of diverticulitis. XR CHEST PORTABLE    Result Date: 5/29/2021  EXAMINATION: ONE XRAY VIEW OF THE CHEST 5/29/2021 8:18 am COMPARISON: February 12, 2019 HISTORY: ORDERING SYSTEM PROVIDED HISTORY: Shortness of breath TECHNOLOGIST PROVIDED HISTORY: Reason for exam:->Shortness of breath FINDINGS: The heart has upper borderline size. There is some relative prominence of the central perihilar vessels. No acute infiltrates or consolidations are seen. There is minimal blunting of the left-sided costophrenic sulcus which could represent minimal left-sided pleural effusion. Calcified granuloma likely seen in the right base. Borderline size heart. Slightly prominent perihilar vessels. The cannot exclude minimal left-sided pleural effusion. US RETROPERITONEAL COMPLETE    Result Date: 6/15/2021  EXAMINATION: RETROPERITONEAL ULTRASOUND OF THE KIDNEYS AND URINARY BLADDER 6/15/2021 COMPARISON: AP CT 05/29/2021 HISTORY: ORDERING SYSTEM PROVIDED HISTORY: rigo TECHNOLOGIST PROVIDED HISTORY: Ascites Reason for exam:->rigo What reading provider will be dictating this exam?->CRC FINDINGS: RENAL MEASUREMENTS: Length of visualized right kidney: 10.8 cm. Right renal cortical thickness: 17 mm. Length of visualized left kidney: 11.3 cm. Left renal cortical thickness: 18 mm. RIGHT KIDNEY: Focal parenchymal or cortical lesion: A non-specific, smoothly marginated, hypoechoic lesion is statistically most likely a cyst.  There is no evidence of mural nodularity, septation, wall thickening, or calcification. It measures 18 mm and is again noted in the right renal lower pole. Possible calculus: None. Hydronephrosis: None. Perinephric fluid: None. LEFT KIDNEY: Upper pole not well visualized sonographically. Focal parenchymal or cortical lesion: None. Possible calculus: None. Hydronephrosis: None. Perinephric fluid: None. URINARY BLADDER: Focal abnormality: None. Wall thickening: None. Prevoid volume: 196cc.  Slight to moderate right upper quadrant and pelvic ascites. No sonographic evidence of acute renal pathology, mass, or calculus. Abdomen and pelvic ascites       EKG: See Report  Echo: See Report      IMPRESSIONS:  Principal Problem:    KEANU (acute kidney injury) ([address]  Active Problems:    Ascites    HTN (hypertension), benign    CKD (chronic kidney disease) stage 3, GFR 30-59 ml/min (HCC)    Atrial fibrillation, chronic (HCC)    Obesity (BMI 30-39. 9)    Hyperlipidemia LDL goal <100    Bilateral carotid artery stenosis    Hyperkalemia  Resolved Problems:    * No resolved hospital problems. *      RECOMMENDATIONS:  ***    I have spent more than *** minutes face to face with Lancaster Heading and reviewing notes and laboratory data, with greater than 50% of this time instructing and counseling the patient *** face to face regarding my findings and recommendations and I have answered all questions as posed to me by Mr. Oli King, DO FACP,FACC,FSCAI      NOTE:  This report was transcribed using voice recognition software.   Every effort was made to ensure accuracy; however, inadvertent computerized transcription errors may be present

## 2021-06-18 NOTE — PROGRESS NOTES
12:53 PM  Consult placed for Dr. Say Neal.   Rajwinder Ga, [facility] [place] 6/18/2021

## 2021-06-18 NOTE — PROGRESS NOTES
Internal Medicine Progress Note    Patient's name: José Miguel Cai  : 1940  Chief complaints (on day of admission): Hyperkalemia  Admission date: 6/15/2021  Date of service: 2021   Room: [address] INTERNAL MEDICINE  Primary care physician: Willow Reeder DO  Reason for visit: Follow-up for KEANU/hyperkalemia     Subjective  Jose Armando Ingram was seen and examined. He was resting comfortably. He told me that his edema is much worse. His breathing is stable. His abdomen is edematous. He denies new issues or problems though. Review of Systems  There are no new complaints of chest pain, shortness of breath, abdominal pain, nausea, vomiting, diarrhea, constipation.     Hospital Medications  Current Facility-Administered Medications   Medication Dose Route Frequency Provider Last Rate Last Admin    sodium zirconium cyclosilicate (LOKELMA) oral suspension 5 g  5 g Oral TID Issa Warren MD   5 g at 21 0940    ferric gluconate (FERRLECIT) 100 mg in sodium chloride 0.9 % 100 mL IVPB  100 mg Intravenous Once Issa Warren MD        Followed by   [address] [place] [address] ferric gluconate (FERRLECIT) 125 mg in sodium chloride 0.9 % 100 mL IVPB  125 mg Intravenous Daily Issa Warren  mL/hr at 21 1838 125 mg at 21 1838    sodium chloride flush 0.9 % injection 10 mL  10 mL Intravenous 2 times per day Abhi Hodges, DO   10 mL at 21 0848    sodium chloride flush 0.9 % injection 10 mL  10 mL Intravenous PRN Abhi Hodges DO        0.9 % sodium chloride infusion  25 mL Intravenous PRN Abhi Hodges, DO        ondansetron (ZOFRAN-ODT) disintegrating tablet 4 mg  4 mg Oral Q8H PRN Abhi Hodges DO        Or    ondansetron (ZOFRAN) injection 4 mg  4 mg Intravenous Q6H PRN Abhi Hodges, DO        senna (SENOKOT) tablet 8.6 mg  1 tablet Oral Daily PRN Abhi Hodges, DO        allopurinol (ZYLOPRIM) tablet 300 mg  300 mg Oral Daily Abhi Hodges DO   300 mg at 21 0936    amLODIPine (NORVASC) tablet 10 mg  10 mg Oral Daily Abhi MEMO Hodges, DO   10 mg at 06/18/21 0935    hydrALAZINE (APRESOLINE) tablet 75 mg  75 mg Oral TID Abhi MEMO Lucianawindy, DO   75 mg at 06/18/21 0935    levothyroxine (SYNTHROID) tablet 25 mcg  25 mcg Oral Daily Abhi Hodges, DO   25 mcg at 06/18/21 0601    hydrOXYzine (ATARAX) tablet 10 mg  10 mg Oral Nightly Abhi MEMO Tracie, DO   10 mg at 06/17/21 2122    metoprolol succinate (TOPROL XL) extended release tablet 50 mg  50 mg Oral Daily Abhi MEMO Tracie, DO   50 mg at 06/18/21 0936    pantoprazole (PROTONIX) tablet 40 mg  40 mg Oral Daily Abhi MEMO Tracie, DO   40 mg at 06/18/21 0936    primidone (MYSOLINE) tablet 100 mg  100 mg Oral BID Abhi MEMO Tracie, DO   100 mg at 06/18/21 0940    sodium bicarbonate tablet 650 mg  650 mg Oral BID Abhi Hodges, DO   650 mg at 06/18/21 0936    tamsulosin (FLOMAX) capsule 0.4 mg  0.4 mg Oral Daily Abhi MEMO Tracie, DO   0.4 mg at 06/18/21 0935    vitamin D (CHOLECALCIFEROL) tablet 2,000 Units  2,000 Units Oral Daily Abhi MEMO Lucianawindy, DO   2,000 Units at 06/18/21 0935    perflutren lipid microspheres (DEFINITY) injection 1.65 mg  1.5 mL Intravenous ONCE PRN Abhi Hodges DO        apixaban (ELIQUIS) tablet 2.5 mg  2.5 mg Oral BID Abhi Hodges, DO   2.5 mg at 06/18/21 0601    0.9 % sodium chloride infusion   Intravenous Continuous Sharda Barbour  mL/hr at 06/17/21 1655 New Bag at 06/17/21 1655    dextrose 50 % IV solution  25 g Intravenous PRN Sharda Barbour MD           PRN Medications  sodium chloride flush, sodium chloride, ondansetron **OR** ondansetron, senna, perflutren lipid microspheres, dextrose    Objective  Most Recent Recorded Vitals  BP (!) 163/81   Pulse 86   Temp 97.8 °F (36.6 °C) (Infrared)   Resp 18   Ht 5' 9\" (1.753 m)   Wt 243 lb 12.8 oz (110.6 kg)   SpO2 94%   BMI 36.00 kg/m²   I/O last 3 completed shifts:   In: 600 [P.O.:600]  Out: 850 [Urine:850]  I/O this shift:  In: 150 [P.O.:150]  Out: 300 [Urine:300]    Physical Exam:  General: AAO to person/place/time/purpose, NAD, no labored breathing, flat affect  Eyes: conjunctivae/corneas clear, sclera non icteric  Skin: color/texture/turgor normal, no rashes or lesions  Lungs: CTAB, no retractions/use of accessory muscles, no vocal fremitus, no rhonchi, no crackle, no rales  Heart: regular rate, regular rhythm, systolic murmur  Abdomen: distended with fluid wave soft, NT, bowel sounds normal  Extremities: atraumatic, LE edema slightly worse than yesterday  Neurologic: cranial nerves 2-12 grossly intact, no slurred speech    Most Recent Labs  Lab Results   Component Value Date    WBC 6.7 06/18/2021    HGB 8.9 (L) 06/18/2021    HCT 26.9 (L) 06/18/2021     (L) 06/18/2021     06/18/2021    K 5.1 (H) 06/18/2021     (H) 06/18/2021    CREATININE 3.7 (H) 06/18/2021    BUN 99 (H) 06/18/2021    CO2 22 06/18/2021    GLUCOSE 106 (H) 06/18/2021    ALT 10 06/18/2021    AST 28 06/18/2021    INR 1.2 06/15/2021    TSH 0.728 12/17/2014       US RETROPERITONEAL COMPLETE   Final Result   No sonographic evidence of acute renal pathology, mass, or calculus. Abdomen and pelvic ascites           CT ABDOMEN PELVIS WO CONTRAST Additional Contrast? None    Result Date: 5/29/2021  EXAMINATION: CT OF THE ABDOMEN AND PELVIS WITHOUT CONTRAST 5/29/2021 8:18 am TECHNIQUE: CT of the abdomen and pelvis was performed without the administration of intravenous contrast. Multiplanar reformatted images are provided for review. Dose modulation, iterative reconstruction, and/or weight based adjustment of the mA/kV was utilized to reduce the radiation dose to as low as reasonably achievable.  COMPARISON: CT abdomen and pelvis dated 12/19/2019 HISTORY: ORDERING SYSTEM PROVIDED HISTORY: Abdominal distention TECHNOLOGIST PROVIDED HISTORY: Reason for exam:->Abdominal distention Additional Contrast?->None Decision Support Exception - unselect if not a suspected or confirmed emergency medical condition->Emergency Medical Condition (MA) FINDINGS: Lower Chest: There are moderate left and small right pleural effusions with some compression atelectasis in the left lower lobe. No definite acute airspace disease is seen at either lung base. The heart is top-normal in size. There are mild coronary artery calcifications which is a marker for coronary atherosclerotic disease. Organs: No obvious hepatic, splenic, pancreatic, adrenal or renal mass is seen. A lesion could be missed in the absence of IV contrast.  There is a stable approximately 2 cm hypodensity in the posterior aspect of the lower pole of the right kidney, consistent with a cyst.  There is no evidence of hydronephrosis or renal calculi. The gallbladder is surgically absent. The liver shows slightly nodular contour that can be associated with cirrhosis. GI/Bowel: There is no evidence of bowel obstruction. No evidence of abnormal bowel wall thickening or distension. There is sigmoid diverticulosis without evidence of diverticulitis. Pelvis: Moderate to large amount of free fluid is seen in the pelvis. No pelvic mass or lymphadenopathy is seen. Peritoneum/Retroperitoneum: There is moderate to large amount of ascites. No evidence of retroperitoneal lymphadenopathy. There is no evidence of intraperitoneal lymphadenopathy. Prominent calcified plaque is seen in the abdominal aorta and iliac vessels with no evidence of aneurysm. The bilateral iliac stents are noted. Bones/Soft Tissues:  Age related degenerative changes of the visualized osseous structures without focal destructive lesion. There is diffuse subcutaneous edema, consistent with anasarca. 1. No evidence of bowel obstruction. 2. Moderate to large amount of ascites. Findings consistent with anasarca. 3. Moderate left and small right pleural effusions with mild left basilar atelectasis.  4. Slightly nodular contour of the liver that can be associated with cirrhosis although this is equivocal. 5. Sigmoid diverticulosis. No evidence of diverticulitis. XR CHEST PORTABLE    Result Date: 5/29/2021  EXAMINATION: ONE XRAY VIEW OF THE CHEST 5/29/2021 8:18 am COMPARISON: February 12, 2019 HISTORY: ORDERING SYSTEM PROVIDED HISTORY: Shortness of breath TECHNOLOGIST PROVIDED HISTORY: Reason for exam:->Shortness of breath FINDINGS: The heart has upper borderline size. There is some relative prominence of the central perihilar vessels. No acute infiltrates or consolidations are seen. There is minimal blunting of the left-sided costophrenic sulcus which could represent minimal left-sided pleural effusion. Calcified granuloma likely seen in the right base. Borderline size heart. Slightly prominent perihilar vessels. The cannot exclude minimal left-sided pleural effusion. Echo 6/17/21   Left ventricle grossly normal in size. Early paradoxical septal motion. Normal left ventricular wall thickness. Doppler/Tissue doppler not obtained. Estimated left ventricular ejection fraction is 45±5%. The LAESV Index is >34 ml/m2. Moderately dilated right ventricle. TAPSE is decreased   Right ventricle global systolic function is moderately reduced . Physiologic and/or trace mitral regurgitation is present. The aortic valve appears moderately sclerotic. Mild aortic stenosis is present. Moderate to severe tricuspid regurgitation. Pulmonary hypertension is severe . RVSP is >69 mmHg. Pulmonary hypertension is severe . Mild pulmonic regurgitation present. Technically good quality study. Technically difficult study due to patient''s body habitus. Compared to prior echo, changes noted. Suggest clinical correlation.     Assessment   Active Hospital Problems    Diagnosis     KEANU (acute kidney injury) ([facility] [address] [N17.9]      Priority: High    Ascites [R18.8]      Priority: Medium    Hyperkalemia [E87.5]      Priority: Medium    CKD (chronic kidney disease) stage 3, GFR 30-59 ml/min ([address] [N18.30]      Priority: Medium    Bilateral carotid artery stenosis [I65.23]     Obesity (BMI 30-39. 9) [E66.9]     Hyperlipidemia LDL goal <100 [E78.5]     Atrial fibrillation, chronic (HCC) [I48.20]     HTN (hypertension), benign [I10]          Plan  · KEANU on CKD 3 with hyperkalemia: Baseline serum creatinine somewhere between 1.4 and 2.4. Follow BMP. Urine labs. Defer diuresis/IVF hydration to nephrology-patient is significantly edematous. Hold lisinopril. Continue allopurinol/sodium bicarb. Suzy Estrin. 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 GI following for interpretation of ascitic fluid studies and cirrhosis w/u. Hepatitis panel negative. Noted echo and cardio consult still pending. Pulmonology consultation. Ask IR for therapeutic paracentesis on 6/18. · History of rectal cancer: General surgery following. · Macrocytic anemia: Noted Fe/Ferritin/TIBC/vitamin B12/folate. Follow H&H. Pending stool for occult blood. · PT AM-PAC-- 17/24  · Follow labs  · DVT prophylaxis. · Please see orders for further management and care. ·  for discharge planning  · Discharge plan: TBD pending clinical improvement     Electronically signed by Samra Knapp DO on 6/18/2021 at 11:46 AM    I can be reached through "[employer], Mobile Secretary".

## 2021-06-18 NOTE — PROGRESS NOTES
ECHO reviewed and is consistent with heart disease as the primary etiology of his ascites/anasarca  Severe pulmonary hypertension  Seems unlikely he will mobilize fluid with diuretics  ?  Candidate for dialysis

## 2021-06-18 NOTE — CONSULTS
CARDIOLOGY CONSULTATION    Patient Name:  Kendy Wyman    :  1940    Reason for Consultation:   Atrial fibrillation    History of Present Illness:   Kendy Wyman presents to [facility],following    Past Medical History:   has a past medical history of Ascites, Atrial fibrillation ([facility] [address] Bilateral carotid artery stenosis, CKD (chronic kidney disease) stage 3, GFR 30-59 ml/min ([facility] [address] Hyperlipidemia, Hypertension, and Obesity (BMI 30-39.9). Surgical History:   has a past surgical history that includes Carotid endarterectomy; shoulder surgery (Bilateral); Finger trigger release (Bilateral); knee surgery (Right); Cholecystectomy; Carpal tunnel release (Bilateral); Upper gastrointestinal endoscopy (N/A, 2019); Colonoscopy; and Paracentesis (N/A, 6/15/2021). Social History:   reports that he quit smoking about 39 years ago. His smoking use included cigarettes. He started smoking about 71 years ago. He smoked 0.00 packs per day. He has never used smokeless tobacco. He reports previous alcohol use. He reports that he does not use drugs. Family History:  family history includes Cancer in his mother. Father  secondary to cancer as well. Medications:  Prior to Admission medications    Medication Sig Start Date End Date Taking?  Authorizing Provider   hydrOXYzine (ATARAX) 10 MG tablet Take 10 mg by mouth nightly   Yes Historical Provider, MD   levothyroxine (SYNTHROID) 25 MCG tablet Take 25 mcg by mouth Daily   Yes Historical Provider, MD   pantoprazole (PROTONIX) 40 MG tablet Take 40 mg by mouth daily   Yes Historical Provider, MD   lisinopril (PRINIVIL;ZESTRIL) 5 MG tablet Take 5 mg by mouth daily   Yes Historical Provider, MD   furosemide (LASIX) 40 MG tablet Take 1 tablet by mouth 2 times daily for 5 days 21 Yes Lizzie Hernández MD   hydrALAZINE (APRESOLINE) 50 MG tablet Take 1.5 tablets by mouth 3 times daily 3/10/20  Yes Suellen Sandhoff, MD   SODIUM BICARBONATE PO Take 650 mg by mouth 2 times daily   Yes Historical Provider, MD   metoprolol succinate (TOPROL XL) 50 MG extended release tablet Take 1 tablet by mouth daily 11/5/19  Yes Jony Heaton, DO   tamsulosin [facility]) 0.4 MG capsule Take 1 capsule by mouth daily 11/5/19  Yes Jony Florina, DO   amLODIPine (NORVASC) 10 MG tablet Take 1 tablet by mouth daily 11/5/19  Yes Jony Heaton, DO   vitamin D 25 MCG (1000 UT) CAPS Take 2,000 Units by mouth daily   Yes Historical Provider, MD   allopurinol (ZYLOPRIM) 300 MG tablet Take 300 mg by mouth daily   Yes Historical Provider, MD   Apixaban (ELIQUIS PO) Take 5 mg by mouth 2 times daily    Yes Historical Provider, MD   ferrous sulfate 325 (65 Fe) MG tablet Take 325 mg by mouth 2 times daily   Yes Historical Provider, MD   calcitRIOL (ROCALTROL) 0.25 MCG capsule Take 0.25 mcg by mouth daily   Yes Historical Provider, MD   atorvastatin (LIPITOR) 40 MG tablet Take 40 mg by mouth daily  10/1/17  Yes Historical Provider, MD   primidone (MYSOLINE) 50 MG tablet Take 100 mg by mouth 2 times daily  10/25/17  Yes Historical Provider, MD       Allergies:  Patient has no known allergies. Review of Systems:   · Constitutional: there has been no unanticipated weight loss. There's been no significant change in energy level, sleep pattern or activity level. No fever chills or rigors. · Eyes: No visual changes or diplopia. No scleral icterus. · ENT: No Headaches, hearing loss or vertigo. No mouth sores or sore throat. No change in taste or smell. · Cardiovascular: No chest discomfort, dyspnea on exertion, palpitations, loss of consciousness, no phlebitis, no claudication. · Respiratory: No cough or wheezing, no sputum production. No hemoptysis, pleuritic pain. · Gastrointestinal: No abdominal pain, appetite loss, blood in stools. No change in bowel habits. No hematemesis  · Genitourinary: No dysuria, trouble voiding or hematuria. No nocturia or increased frequency. · Musculoskeletal:  No gait disturbance, weakness or joint complaints. · Integumentary: No rash or pruritis. · Neurological: No headache, diplopia, change in muscle strength, numbness or tingling. No change in gait, balance, coordination, mood, affect, memory, mentation, behavior. · Psychiatric: No anxiety or depression. · Endocrine: No temperature intolerance. No excessive thirst, fluid intake, or urination. No tremor. · Hematologic/Lymphatic: No abnormal bruising or bleeding, blood clots or swollen lymph nodes. · Allergic/Immunologic: No nasal congestion or hives. Physical Examination:    Vital Signs: BP (!) 163/81   Pulse 86   Temp 97.8 °F (36.6 °C) (Infrared)   Resp 18   Ht 5' 9\" (1.753 m)   Wt 243 lb 12.8 oz (110.6 kg)   SpO2 94%   BMI 36.00 kg/m²   General appearance: Well preserved, mesomorphic body habitus, alert, no distress. Skin: Skin color, texture, turgor normal. No rashes or lesions. No induration or tightening palpated. Head: Normocephalic. No masses, lesions, tenderness or abnormalities  Eyes: Conjunctivae/corneas clear. PERRL, EOMs intact. Sclera non icteric. Ears: External ears normal. Canals clear. TM's clear bilaterally. Hearing normal to finger rub. Nose/Sinuses: Nares normal. Septum midline. Mucosa normal. No drainage or sinus tenderness. Oropharynx: Lips, mucosa, and tongue normal. Oropharynx clear with no exudate seen. Neck: Neck supple and symmetric. No adenopathy. Thyroid symmetric, normal size, without nodules. Trachea is midline. Carotids brisk in upstroke without bruits, no abnormal JVP noted at 45°. Chest: Even excursion  Lungs: Lungs clear to auscultation bilaterally. No retractions or use of accessory muscles. No tactile vocal fremitus. No rhonchi, crackles or rales. Heart:  S1 > S2. Regular rhythm. No gallop or murmur. No rub, palpable thrill or heave noted. PMI 5th intercostal space midclavicular line.   Abdomen: Abdomen soft, {Blank single:07961::\"scaphoid\",\"mildly protuberant\",\"moderately protuberant\",\"grossly protuberant\"}, non-tender. BS normal. No masses, organomegaly. No hernia noted. Extremities: Extremities normal. No deformities, edema, or skin discoloration. No cyanosis or clubbing noted to the nails. Peripheral pulses {POSITIVE-NEGATIVE PULSES:97702} upper extremities and {POSITIVE-NEGATIVE PULSES:47719}  lower extremities. Musculoskeletal: Spine ROM normal. Muscular strength intact. Neuro: Cranial nerves intact. Motor: Strength 5/5 in all extremities. Reflexes 2+ in all extremities. No focal weakness. Sensory: grossly normal to touch. Coordination intact. Pertinent Labs:  CBC:   Recent Labs     06/16/21  0700 06/17/21  0650 06/18/21  0430   WBC 9.3 6.3 6.7   HGB 9.8* 8.9* 8.9*    128* 125*     BMP:  Recent Labs     06/16/21  1415 06/17/21  0650 06/18/21  0430    142 142   K 5.3* 5.1*  5.1* 5.1*   * 110* 110*   CO2 21* 20* 22   BUN 99* 95* 99*   CREATININE 4.1* 3.8* 3.7*   GLUCOSE 113* 94 106*   LABGLOM 14 15 16     ABGs: No results found for: PH, PO2, PCO2  INR:   Recent Labs     06/15/21  1353   INR 1.2     PRO-BNP:   Lab Results   Component Value Date    PROBNP 17,337 (H) 04/29/2021    PROBNP 8,244 (H) 02/12/2019      Cardiac Injury Profile: No results for input(s): CKTOTAL, CKMB, CKMBINDEX, TROPONINI in the last 72 hours.    Lipid Profile:   Lab Results   Component Value Date    TRIG 87 12/18/2014    HDL 33 12/18/2014    LDLCALC 60 12/18/2014    CHOL 111 12/18/2014      Thyroid:   Lab Results   Component Value Date    TSH 0.728 12/17/2014      Hemoglobin A1C: No components found for: HGBA1C   ECG:  See report    Radiology:  Echo Complete    Result Date: 6/17/2021  Transthoracic Echocardiography Report (TTE)  Demographics   Patient Name        Imelda Luis  Gender               Male                      T   Medical Record      [id number]       Room Number          9098  Number   Account # [id number]      Procedure Date       06/16/2021   Corporate ID                       Ordering Physician   Dalton Cote   Accession Number    [id number]     Referring Physician  Leslie Azevedo   Date of Birth       1940     Sonographer          Ronel MÉNDEZ   Age                 78 year(s)     Interpreting         Shazia Carey DO                                     Physician                                      Any Other  Procedure Type of Study   TTE procedure:Echo Complete W/Doppler & Color Flow. Procedure Date Date: 06/16/2021 Start: 01:04 PM Study Location: Portable Technical Quality: Limited visualization due to patient immobility. Indications:Congestive heart failure. Patient Status: Routine Height: 69 inches Weight: 242 pounds BSA: 2.24 m^2 BMI: 35.74 kg/m^2 HR: 77 bpm BP: 160/68 mmHg  Findings   Left Ventricle  Left ventricle grossly normal in size. Early paradoxical septal motion. Normal left ventricular wall thickness. Doppler/Tissue doppler not obtained. Estimated left ventricular ejection fraction is 45±5%. Right Ventricle  Moderately dilated right ventricle. TAPSE is decreased  Right ventricle global systolic function is moderately reduced . Left Atrium  The left atrium is moderately dilated. The LAESV Index is >34 ml/m2. Interatrial septum appears intact. Right Atrium  Mild-moderately enlarged right atrium. Mitral Valve  Structurally normal mitral valve. Physiologic and/or trace mitral regurgitation is present. Tricuspid Valve  The tricuspid valve appears structurally normal.  Moderate to severe tricuspid regurgitation. Pulmonary hypertension is severe . RVSP is >69 mmHg. Pulmonary hypertension is severe . Aortic Valve  The aortic valve is trileaflet. Decreased aortic valve leaflet excursion. The aortic valve appears moderately sclerotic. Mild aortic stenosis is present. Pulmonic Valve  Pulmonic valve is structurally normal.  Mild pulmonic regurgitation present. Pericardial Effusion  No evidence of pericardial thickening/calcification present. No evidence of pericardial effusion. Aorta  Aortic root dimension within normal limits. Ascending aorta appears mildly sclerotic and/or calcified. Miscellaneous  Dilated Inferior Vena Cava. Inferior Vena Cava, no respiratory variation. Conclusions   Summary  Left ventricle grossly normal in size. Early paradoxical septal motion. Normal left ventricular wall thickness. Doppler/Tissue doppler not obtained. Estimated left ventricular ejection fraction is 45±5%. The LAESV Index is >34 ml/m2. Moderately dilated right ventricle. TAPSE is decreased  Right ventricle global systolic function is moderately reduced . Physiologic and/or trace mitral regurgitation is present. The aortic valve appears moderately sclerotic. Mild aortic stenosis is present. Moderate to severe tricuspid regurgitation. Pulmonary hypertension is severe . RVSP is >69 mmHg. Pulmonary hypertension is severe . Mild pulmonic regurgitation present. Technically good quality study. Technically difficult study due to patient''s body habitus. Compared to prior echo, changes noted. Suggest clinical correlation.    Signature   ----------------------------------------------------------------  Electronically signed by Kevin Umanzor DO(Interpreting  physician) on 06/17/2021 03:35 PM  ----------------------------------------------------------------  M-Mode/2D Measurements & Calculations   LV Diastolic    LV Systolic Dimension: 3.5   AV Cusp Separation: 1.2 cmLA  Dimension: 4.6  cm                           Dimension: 5.8 cmAO Root  cm              LV Volume Diastolic: 41.5 ml Dimension: 3.1 cm  LV FS:23.9 %    LV Volume Systolic: 93.0 ml  LV PW           LV EDV/LV EDV Index: 86.3  Diastolic: 1.1  /37 KO/N^2RZ ESV/LV ESV  cm              Index: 51.2 ml/23ml/ m^2     RV Diastolic Dimension: 4.7  LV PW Systolic: EF Calculated: 80.1 %        cm  1.2 cm LV Mass Index: 81 l/min*m^2  Septum                                       LA/Aorta: 6.22  Diastolic: 1.1                               Ascending Aorta: 2.9 cm  cm              LVOT: 2 cm                   LA volume/Index: 114 ml  Septum                                       /79OE/F^7  Systolic: 1.2                                RA Area: 28 cm^2  cm  CO: 5.2 l/min                                IVC Expiration: 3 cm  CI: 2.32  l/m*m^2  LV Mass: 181.22  g  Doppler Measurements & Calculations   MV Peak E-Wave: 1.31 AV Peak Velocity:     LVOT Peak Velocity: 1.08 m/s  m/s                  2.09 m/s              LVOT Mean Velocity: 0.78 m/s                       AV Peak Gradient:     LVOT Peak Gradient: 4.6  MV Peak Gradient:    17.39 mmHg            mmHgLVOT Mean Gradient: 2.5  6.8 mmHg             AV Mean Velocity:     mmHg  MV Mean Gradient:    1.48 m/s              Estimated RVSP: 69.4 mmHg  2.9 mmHg             AV Mean Gradient:     Estimated RAP:15 mmHg  MV Mean Velocity:    10.2 mmHg  0.77 m/s             AV VTI: 44.3 cm  MV Deceleration      AV Area               TR Velocity:3.69 m/s  Time: 219.7 msec     (Continuity):1.52     TR Gradient:54.43 mmHg  MV P1/2t: 62.8 msec  cm^2                  PV Peak Velocity: 0.95 m/s  MVA by PHT:3.5 cm^2                        PV Peak Gradient: 3.57 mmHg  MV Area              LVOT VTI: 21.5 cm     PV Mean Velocity: 0.61 m/s  (continuity): 3.6                          PV Mean Gradient: 1.8 mmHg  cm^2                 Estimated PASP: 69.43  MV E' Septal         mmHg  Velocity: 0.08 m/s  MV E' Lateral  Velocity: 14 m/s  [email or web address] [id number]    CT ABDOMEN PELVIS WO CONTRAST Additional Contrast? None    Result Date: 5/29/2021  EXAMINATION: CT OF THE ABDOMEN AND PELVIS WITHOUT CONTRAST 5/29/2021 8:18 am TECHNIQUE: CT of the abdomen and pelvis was performed without the administration of intravenous contrast. Multiplanar reformatted images are provided for review. Dose modulation, iterative reconstruction, and/or weight based adjustment of the mA/kV was utilized to reduce the radiation dose to as low as reasonably achievable. COMPARISON: CT abdomen and pelvis dated 12/19/2019 HISTORY: ORDERING SYSTEM PROVIDED HISTORY: Abdominal distention TECHNOLOGIST PROVIDED HISTORY: Reason for exam:->Abdominal distention Additional Contrast?->None Decision Support Exception - unselect if not a suspected or confirmed emergency medical condition->Emergency Medical Condition (MA) FINDINGS: Lower Chest: There are moderate left and small right pleural effusions with some compression atelectasis in the left lower lobe. No definite acute airspace disease is seen at either lung base. The heart is top-normal in size. There are mild coronary artery calcifications which is a marker for coronary atherosclerotic disease. Organs: No obvious hepatic, splenic, pancreatic, adrenal or renal mass is seen. A lesion could be missed in the absence of IV contrast.  There is a stable approximately 2 cm hypodensity in the posterior aspect of the lower pole of the right kidney, consistent with a cyst.  There is no evidence of hydronephrosis or renal calculi. The gallbladder is surgically absent. The liver shows slightly nodular contour that can be associated with cirrhosis. GI/Bowel: There is no evidence of bowel obstruction. No evidence of abnormal bowel wall thickening or distension. There is sigmoid diverticulosis without evidence of diverticulitis. Pelvis: Moderate to large amount of free fluid is seen in the pelvis. No pelvic mass or lymphadenopathy is seen. Peritoneum/Retroperitoneum: There is moderate to large amount of ascites. No evidence of retroperitoneal lymphadenopathy. There is no evidence of intraperitoneal lymphadenopathy.   Prominent calcified plaque is seen in the abdominal aorta and iliac vessels with no evidence of aneurysm. The bilateral iliac stents are noted. Bones/Soft Tissues:  Age related degenerative changes of the visualized osseous structures without focal destructive lesion. There is diffuse subcutaneous edema, consistent with anasarca. 1. No evidence of bowel obstruction. 2. Moderate to large amount of ascites. Findings consistent with anasarca. 3. Moderate left and small right pleural effusions with mild left basilar atelectasis. 4. Slightly nodular contour of the liver that can be associated with cirrhosis although this is equivocal. 5. Sigmoid diverticulosis. No evidence of diverticulitis. XR CHEST PORTABLE    Result Date: 5/29/2021  EXAMINATION: ONE XRAY VIEW OF THE CHEST 5/29/2021 8:18 am COMPARISON: February 12, 2019 HISTORY: ORDERING SYSTEM PROVIDED HISTORY: Shortness of breath TECHNOLOGIST PROVIDED HISTORY: Reason for exam:->Shortness of breath FINDINGS: The heart has upper borderline size. There is some relative prominence of the central perihilar vessels. No acute infiltrates or consolidations are seen. There is minimal blunting of the left-sided costophrenic sulcus which could represent minimal left-sided pleural effusion. Calcified granuloma likely seen in the right base. Borderline size heart. Slightly prominent perihilar vessels. The cannot exclude minimal left-sided pleural effusion. US RETROPERITONEAL COMPLETE    Result Date: 6/15/2021  EXAMINATION: RETROPERITONEAL ULTRASOUND OF THE KIDNEYS AND URINARY BLADDER 6/15/2021 COMPARISON: AP CT 05/29/2021 HISTORY: ORDERING SYSTEM PROVIDED HISTORY: rigo TECHNOLOGIST PROVIDED HISTORY: Ascites Reason for exam:->rigo What reading provider will be dictating this exam?->CRC FINDINGS: RENAL MEASUREMENTS: Length of visualized right kidney: 10.8 cm. Right renal cortical thickness: 17 mm. Length of visualized left kidney: 11.3 cm. Left renal cortical thickness: 18 mm.  RIGHT KIDNEY: Focal parenchymal or cortical lesion: A non-specific, smoothly marginated, hypoechoic lesion is statistically most likely a cyst.  There is no evidence of mural nodularity, septation, wall thickening, or calcification. It measures 18 mm and is again noted in the right renal lower pole. Possible calculus: None. Hydronephrosis: None. Perinephric fluid: None. LEFT KIDNEY: Upper pole not well visualized sonographically. Focal parenchymal or cortical lesion: None. Possible calculus: None. Hydronephrosis: None. Perinephric fluid: None. URINARY BLADDER: Focal abnormality: None. Wall thickening: None. Prevoid volume: 196cc. Slight to moderate right upper quadrant and pelvic ascites. No sonographic evidence of acute renal pathology, mass, or calculus. Abdomen and pelvic ascites     Assessment:    Principal Problem:    KEANU (acute kidney injury) ([address]  Active Problems:    Ascites    HTN (hypertension), benign    CKD (chronic kidney disease) stage 3, GFR 30-59 ml/min (HCC)    Atrial fibrillation, chronic (HCC)    Obesity (BMI 30-39. 9)    Hyperlipidemia LDL goal <100    Bilateral carotid artery stenosis    Hyperkalemia  Resolved Problems:    * No resolved hospital problems. *      Plan:  ***    I have spent more than *** minutes face to face with Gloria Martins reviewing notes and laboratory data with greater than 50% of this time instructing and counseling the patient *** regarding my findings and recommendations and I have answered all questions as posed to me by  Lilia Popely. Thank you, Brady Herrera DO for allowing me to consult in the care of this patient. Rissa Waterman DO DO, FACP, [facility]    NOTE:  This report was transcribed using voice recognition software. Every effort was made to ensure accuracy; however, inadvertent computerized transcription errors may be present.

## 2021-06-18 NOTE — CONSULTS
Pulmonary Consultation    Admit Date: 6/15/2021  Requesting Physician: Susi Alaniz DO    Reason for consultation:  · Pulmonary hypertension  HPI:  · Bud Romero is an 49-year-old white male who presented to this institution with ascites and increasing shortness of breath. According the patient, he cannot walk across the room without becoming increasingly dyspneic and the dyspnea eventually comes disabling. · The patient notes that over the past month or so he has had significant swelling of his lower extremities. Seen by cardiology, an echocardiogram shows a well-preserved ejection fraction however significant right heart dysfunction. · From a pulmonary standpoint, the patient is a lifelong non-smoker and has no occupational history of asbestos or fibrogenic substances. He denies any daytime hypersomnolence or snoring. In the past, in 2019, the patient underwent a CT-a of the chest looking for pulmonary embolic disease. There was no new pulmonary parenchyma appeared normal.    · According the patient, his left leg swells more than his right leg but it is not really painful. There is been no hemoptysis or sudden onset of shortness of breath. There is been no chest pain.     PMH:    Past Medical History:   Diagnosis Date    Ascites     Atrial fibrillation ([address]     Bilateral carotid artery stenosis 2/5/2020    CKD (chronic kidney disease) stage 3, GFR 30-59 ml/min (formerly [facility]) 12/17/2014    Hyperlipidemia     Hypertension     Obesity (BMI 30-39.9) 12/21/2019     PSH:   Past Surgical History:   Procedure Laterality Date    CAROTID ENDARTERECTOMY      CARPAL TUNNEL RELEASE Bilateral     CHOLECYSTECTOMY      COLONOSCOPY      FINGER TRIGGER RELEASE Bilateral     KNEE SURGERY Right     PARACENTESIS N/A 6/15/2021    ULTRASOUND GUIDED PARACENTESIS performed by Wendi Mclaughlin MD at [address] [place] [address] Bilateral     UPPER GASTROINTESTINAL ENDOSCOPY N/A 2/7/2019    EGD BIOPSY performed by Will Bhatia MD at [address] [place] [address]       Review of Systems:   · Constitutional: As noted in the HPI. · Eyes: No visual changes or diplopia. No scleral icterus. · ENT: No headaches, hearing loss or vertigo. No nasal congestion, or sore throat. · Cardiovascular: No chest pain, dyspnea on exertion, or palpitations. · Respiratory: See above  · Gastrointestinal: No abdominal pain, nausea or emesis. No diarrhea or rectal bleeding or melena. No change in bowel habits. · Genitourinary: No dysuria, urinary frequency, or incontinence. No hematuria. · Musculoskeletal: No gait disturbance, weakness or joint complaints. · Integumentary: No rash or pruritis. No abnormal pigmentation, hair or nail changes. · Neurological: No headache, diplopia, dizziness, tremor, change in muscle strength, numbness or tingling. No change in gait, balance, coordination, mood, affect, memory, mentation, behavior. · Psychiatric: No anxiety or depression. · Endocrine: No temperature intolerance, excessive thirst, fluid intake, urinary frequency, excessive appetite, or recent weight change. · Hematologic/Lymphatic: No abnormal bruising or bleeding, blood clots or swollen lymph nodes. No anemia, fever, chills, night sweats, or swollen glands. · Allergic/Immunologic: No seasonal or perenial allergies. No history of hives or atopic dermatitis.     Social History:  · Alcohol:  No  · Tobacco:   No  · Employment:  no silica or asbestos exposure  · Family:  No family history of lung disease    Medications:   sodium chloride        [START ON 6/19/2021] amLODIPine  5 mg Oral Daily    sodium zirconium cyclosilicate  5 g Oral TID    ferric gluconate (FERRLECIT) IVPB  100 mg Intravenous Once    Followed by    ferric gluconate (FERRLECIT) IVPB  125 mg Intravenous Daily    sodium chloride flush  10 mL Intravenous 2 times per day    allopurinol  300 mg Oral Daily    hydrALAZINE  75 mg Oral TID    levothyroxine  25 mcg Oral Daily    hydrOXYzine  10 mg Oral Nightly    metoprolol succinate  50 mg Oral Daily    pantoprazole  40 mg Oral Daily    primidone  100 mg Oral BID    sodium bicarbonate  650 mg Oral BID    tamsulosin  0.4 mg Oral Daily    vitamin D  2,000 Units Oral Daily       Vitals:  Tmax:  VITALS:  BP (!) 135/52   Pulse 77   Temp 97.5 °F (36.4 °C) (Oral)   Resp 18   Ht 5' 9\" (1.753 m)   Wt 243 lb 12.8 oz (110.6 kg)   SpO2 95%   BMI 36.00 kg/m²   24HR INTAKE/OUTPUT:      Intake/Output Summary (Last 24 hours) at 2021 1717  Last data filed at 2021 1307  Gross per 24 hour   Intake 390 ml   Output 1150 ml   Net -760 ml     CURRENT PULSE OXIMETRY:  SpO2: 95 %  24HR PULSE OXIMETRY RANGE:  SpO2  Av %  Min: 93 %  Max: 95 %    EXAM:  General: No distress. Alert. Eyes: PERRL. No sclera icterus. No conjunctival injection. ENT: No discharge. Pharynx clear. Neck: Trachea midline. Normal thyroid. No jvd, no hjr. Resp: No wheezing. No accessory muscle use. No rales. No rhonchi. CV: Regular rate. Regular rhythm. 3/6 hs murmur No rub. Abd: Non-tender. Non-distended. No masses. No organmegaly. Normal bowel sounds. Skin: Warm and dry. No nodule on exposed extremities. No rash on exposed extremities. Lymph: No cervical LAD. No supraclavicular LAD. Ext: No joint deformity. No clubbing. No cyanosis. 4+ L>R edema  Neuro: Awake. Follows commands. Positive pupils/gag/corneals. Normal pain response.      Lab Results:  CBC:   Recent Labs     21  0700 21  0650 21  0430   WBC 9.3 6.3 6.7   HGB 9.8* 8.9* 8.9*   HCT 31.4* 27.7* 26.9*   .1* 106.1* 105.5*    128* 125*       BMP:  Recent Labs     21  0700 21  1415 21  0650 21  0430    142 142 142   K 5.5* 5.3* 5.1*  5.1* 5.1*   * 111* 110* 110*   CO2 21* 21* 20* 22   PHOS 4.1  --  3.9 4.4   * 99* 95* 99*   CREATININE 4.2* 4.1* 3.8* 3.7*    ALB:3,BILIDIR:3,BILITOT:3,ALKPHOS:3)@    PT/INR: No results for input(s): PROTIME, INR in the last 72 hours. Cultures:  Sputum: not available  Blood: not available    ABG:   No results for input(s): PH, PO2, PCO2, HCO3, BE, O2SAT, METHB, O2HB, COHB, O2CON, HHB, THB in the last 72 hours. Films:     US ABDOMEN LIMITED   Final Result   Moderate abdominal ascites throughout the abdomen. US RETROPERITONEAL COMPLETE   Final Result   No sonographic evidence of acute renal pathology, mass, or calculus. Abdomen and pelvic ascites         IR Interventional Radiology Procedure Request    (Results Pending)   US LOWER EXTREMITY BILATERAL VEIN MAPPING W DVT    (Results Pending)   . Assessment:  1. Pulmonary hypertension with right heart failure. This is resulted in significant peripheral edema and ascites. It is unlikely that the pulmonary hypertension is from significant nocturnal hypoxia or thromboembolic disease. Nonetheless, because of the asymmetric swelling of lower extremities, DVT is not excluded. Valvular heart disease may be underlying. Type I pulmonary hypertension is thought to be less likely. 2. CKD 4 per nephrology  3. Valvular heart disease with well preserved ejection fraction      Plan:  1. Check ultrasounds of the lower extremities  2. Paracentesis  3. CKD per nephrology  4. Consideration for tertiary referral for right heart catheterization. Thanks for letting us see this patient in consultation. Total time in reviewing the previous admissions and records, reviewing the current x-rays, labs, and discussing with clinical staff including nursing and physicians, exceeded 50 minutes. Please contact us with any questions. Office [phone] or after hours through [employer], x [phone]. Please note that voice recognition technology was used (while wearing a Covid mandated mask) in the preparation of this note and make therefore it may contain inadvertent transcription errors.   If the patient is a COVID 19 isolation patient, the above physical exam reflects that of the examining physician for the day. Rayna Carpenter MD,  MJEANNINE., F.C.C.P.     [facility], [address] [place] [address], [address] [place] [address]

## 2021-06-18 NOTE — PROGRESS NOTES
Dr Magy Cid returned call and orders received to hold [facility].   He is aware of peracentesis on monday

## 2021-06-18 NOTE — PLAN OF CARE
Problem: Falls - Risk of:  Goal: Will remain free from falls  Description: Will remain free from falls  6/18/2021 1428 by Barbara Bautista RN  Outcome: Ongoing  6/18/2021 0326 by Alexis Corral RN  Outcome: Met This Shift  Goal: Absence of physical injury  Description: Absence of physical injury  6/18/2021 1428 by Barbara Bautista RN  Outcome: Ongoing  6/18/2021 0326 by Alexis Corral RN  Outcome: Met This Shift

## 2021-06-19 NOTE — PROGRESS NOTES
[facility]  [address] [facility], [address], [address] [facility]      Pulmonary Progress Note      SUBJECTIVE:  Sitting up in chair on RA, claims ok with breathing with not much cough/congestion, minimal ambulation in room and tolerating.     OBJECTIVE    Medications    Continuous Infusions:   sodium chloride         Scheduled Meds:   amLODIPine  5 mg Oral Daily    sodium zirconium cyclosilicate  5 g Oral TID    ferric gluconate (FERRLECIT) IVPB  100 mg Intravenous Once    Followed by   John Noel ferric gluconate (FERRLECIT) IVPB  125 mg Intravenous Daily    sodium chloride flush  10 mL Intravenous 2 times per day    allopurinol  300 mg Oral Daily    hydrALAZINE  75 mg Oral TID    levothyroxine  25 mcg Oral Daily    hydrOXYzine  10 mg Oral Nightly    metoprolol succinate  50 mg Oral Daily    pantoprazole  40 mg Oral Daily    primidone  100 mg Oral BID    sodium bicarbonate  650 mg Oral BID    tamsulosin  0.4 mg Oral Daily    vitamin D  2,000 Units Oral Daily       PRN Meds:acetaminophen, sodium chloride flush, sodium chloride, ondansetron **OR** ondansetron, senna, perflutren lipid microspheres, dextrose    Physical    VITALS:  BP (!) 153/66   Pulse 79   Temp 98.7 °F (37.1 °C) (Oral)   Resp 18   Ht 5' 9\" (1.753 m)   Wt 243 lb 9.6 oz (110.5 kg)   SpO2 96%   BMI 35.97 kg/m²     24HR INTAKE/OUTPUT:      Intake/Output Summary (Last 24 hours) at 2021 0841  Last data filed at 2021  Gross per 24 hour   Intake 340 ml   Output 300 ml   Net 40 ml       24HR PULSE OXIMETRY RANGE:    SpO2  Av.5 %  Min: 95 %  Max: 96 %    General appearance: alert, appears stated age and cooperative  Lungs: rhonchi bibasilar  Heart: regular rate and rhythm, S1, S2 normal, no murmur, click, rub or gallop  Abdomen: soft, non-tender; bowel sounds normal; no masses,  no organomegaly  Extremities: edema bipedal  Neurologic: Mental status: Alert, oriented, thought content appropriate    Data    CBC:   Recent Labs     06/17/21  0650 06/18/21  0430 06/19/21  0700   WBC 6.3 6.7 5.9   HGB 8.9* 8.9* 9.0*   HCT 27.7* 26.9* 27.0*   .1* 105.5* 105.1*   * 125* 145       BMP:  Recent Labs     06/17/21  0650 06/18/21  0430 06/19/21  0700    142 141   K 5.1*  5.1* 5.1* 4.6   * 110* 110*   CO2 20* 22 21*   PHOS 3.9 4.4  --    BUN 95* 99* 86*   CREATININE 3.8* 3.7* 3.3*    ALB:3,BILIDIR:3,BILITOT:3,ALKPHOS:3)@    PT/INR: No results for input(s): PROTIME, INR in the last 72 hours. ABG:   No results for input(s): PH, PO2, PCO2, HCO3, BE, O2SAT, METHB, O2HB, COHB, O2CON, HHB, THB in the last 72 hours. Radiology/Other tests reviewed: none    Assessment:     Principal Problem:    KEANU (acute kidney injury) ([facility]  Active Problems:    Ascites    HTN (hypertension), benign    CKD (chronic kidney disease) stage 3, GFR 30-59 ml/min (HCC)    Atrial fibrillation, chronic (HCC)    Obesity (BMI 30-39. 9)    Hyperlipidemia LDL goal <100    Bilateral carotid artery stenosis    Hyperkalemia  Resolved Problems:    * No resolved hospital problems. *      Plan:       1. Watch fluid balance, diurese as needed, CKD as per Renal  2. On RA and no lung medications, observe respiratory function and oxygenation  3. CT abdomen last month with moderate left pleural effusion and ascitis. May need paracentesis first before considering thoracentesis unless continues to worsen  4. (?) RHC to assess pulmonary HTN and options for treatment      Time at the bedside, reviewing labs and radiographs, reviewing notes and consultations, discussing with staff and family was more than 35 minutes. Thanks for letting us see this patient in consultation. Please contact us with any questions. Office [phone] or after hours through [employer], x [phone].

## 2021-06-19 NOTE — PLAN OF CARE
Problem: Skin Integrity:  Goal: Will show no infection signs and symptoms  Description: Will show no infection signs and symptoms  Outcome: Met This Shift     Problem: Skin Integrity:  Goal: Absence of new skin breakdown  Description: Absence of new skin breakdown  Outcome: Met This Shift     Problem: Falls - Risk of:  Goal: Will remain free from falls  Description: Will remain free from falls  6/19/2021 0033 by Nayana Evans RN  Outcome: Met This Shift     Problem: Falls - Risk of:  Goal: Absence of physical injury  Description: Absence of physical injury  6/19/2021 0033 by Nayana Evans RN  Outcome: Met This Shift     Problem: Nutritional:  Goal: Maintenance of adequate nutrition will be supported  Description: Maintenance of adequate nutrition will be supported  Outcome: Met This Shift

## 2021-06-19 NOTE — PROGRESS NOTES
sodium chloride       Scheduled Medications    amLODIPine  5 mg Oral Daily    sodium zirconium cyclosilicate  5 g Oral TID    ferric gluconate (FERRLECIT) IVPB  100 mg Intravenous Once    Followed by   Blase Liming ferric gluconate (FERRLECIT) IVPB  125 mg Intravenous Daily    sodium chloride flush  10 mL Intravenous 2 times per day    allopurinol  300 mg Oral Daily    hydrALAZINE  75 mg Oral TID    levothyroxine  25 mcg Oral Daily    hydrOXYzine  10 mg Oral Nightly    metoprolol succinate  50 mg Oral Daily    pantoprazole  40 mg Oral Daily    primidone  100 mg Oral BID    sodium bicarbonate  650 mg Oral BID    tamsulosin  0.4 mg Oral Daily    vitamin D  2,000 Units Oral Daily     PRN Meds: acetaminophen, sodium chloride flush, sodium chloride, ondansetron **OR** ondansetron, senna, perflutren lipid microspheres, dextrose    I/O    Intake/Output Summary (Last 24 hours) at 6/19/2021 1332  Last data filed at 6/19/2021 1056  Gross per 24 hour   Intake 325 ml   Output --   Net 325 ml       Labs:   Recent Labs     06/17/21  0650 06/18/21  0430 06/19/21  0700   WBC 6.3 6.7 5.9   HGB 8.9* 8.9* 9.0*   HCT 27.7* 26.9* 27.0*   * 125* 145       Recent Labs     06/17/21  0650 06/18/21  0430 06/19/21  0700    142 141   K 5.1*  5.1* 5.1* 4.6   * 110* 110*   CO2 20* 22 21*   BUN 95* 99* 86*   CREATININE 3.8* 3.7* 3.3*   CALCIUM 9.1 9.0 8.9   PHOS 3.9 4.4  --        Recent Labs     06/17/21  0650 06/18/21  0430 06/19/21  0700   PROT 6.3* 6.6 6.1*   ALKPHOS 229* 244* 247*   ALT 12 10 11   AST 27 28 30   BILITOT 0.8 0.8 0.6       No results for input(s): INR in the last 72 hours. No results for input(s): [place] in the last 72 hours.     Chronic labs:  Lab Results   Component Value Date    CHOL 111 12/18/2014    TRIG 87 12/18/2014    HDL 33 12/18/2014    LDLCALC 60 12/18/2014    TSH 0.728 12/17/2014    INR 1.2 06/15/2021       Radiology:  CT ABDOMEN PELVIS WO CONTRAST Additional Contrast? None    Result Date: 5/29/2021  1. No evidence of bowel obstruction. 2. Moderate to large amount of ascites. Findings consistent with anasarca. 3. Moderate left and small right pleural effusions with mild left basilar atelectasis. 4. Slightly nodular contour of the liver that can be associated with cirrhosis although this is equivocal. 5. Sigmoid diverticulosis. No evidence of diverticulitis. XR CHEST PORTABLE    Result Date: 5/29/2021  Borderline size heart. Slightly prominent perihilar vessels. The cannot exclude minimal left-sided pleural effusion. US ABDOMEN LIMITED    Result Date: 6/18/2021  Moderate abdominal ascites throughout the abdomen. US RETROPERITONEAL COMPLETE    Result Date: 6/15/2021  No sonographic evidence of acute renal pathology, mass, or calculus. Abdomen and pelvic ascites     US DUP LOWER EXTREMITIES BILATERAL VENOUS    Result Date: 6/18/2021  No evidence of DVT in either lower extremity. ASSESSMENT:  Pulmonary hypertension  Principal Problem:    KEANU (acute kidney injury) ([facility] [address]  Active Problems:    Ascites    HTN (hypertension), benign    CKD (chronic kidney disease) stage 3, GFR 30-59 ml/min (HCC)    Atrial fibrillation, chronic (HCC)    Obesity (BMI 30-39. 9)    Hyperlipidemia LDL goal <100    Bilateral carotid artery stenosis    Hyperkalemia  Resolved Problems:    * No resolved hospital problems. *       PLAN:  1. Paracentesis again on Monday  2. CKD seems to be stable including potassium creatinine is at 3.3  3. Maintain premorbid medications for heart rate control and proton pump inhibitor for acid reflux and hyperuricemia to be treated with allopurinol and hypertension control with nonnephrotoxic drugs  4, significant pulmonary hypertension and ?need for right-sided heart catheterization so we will need cardiology    Diet: ADULT DIET; Regular;  No Added Salt (3-4 gm)  Adult Oral Nutrition Supplement; Protein Modular  Code Status: Full Code  PT/OT Eval Status: order  DVT Prophylaxis:    was in Eliquis and it is on hold for paracentesis  Recommended disposition at discharge:    Not sure yet  +++++++++++++++++++++++++++++++++++++++++++++++++  John Telles MD   [facility].  +++++++++++++++++++++++++++++++++++++++++++++++++  NOTE: This report was transcribed using voice recognition software. Every effort was made to ensure accuracy; however, inadvertent computerized transcription errors may be present.

## 2021-06-19 NOTE — PROGRESS NOTES
[facility] MD Robin Stephens, MD Jerman Garcia MD Carlynn Quirk, CNP   [place], NATALIE  Progress Note    6/19/2021    SUBJECTIVE:   6/16: Feeling better. With edema better now that he had his lymphedema wraps in place. No dyspnea at rest on room air. Ongoing fatigue and generalized weakness. Continued abdominal distention, no pain. No cp/palp Appetite ok  6/17: \"Very tired\" did not sleep very well last night. No dyspnea at rest supine on room air. Substantial lower extremity edema persists lymphedema distally somewhat better, though abdominal girth worse than yesterday. 6/18 : seen today on RA , weak , LE swelling noted . 6/19: Patient was seen and examined today case was discussed thoroughly with the nurse, patient still has severe swelling in his lower extremities with ascites noted probably old right heart failure this patient that has also bilateral ventricular failures, will start diuretics today with Bumex and follow clinically course    PROBLEM LIST:    Principal Problem:    KEANU (acute kidney injury) ([address]  Active Problems:    Ascites    HTN (hypertension), benign    CKD (chronic kidney disease) stage 3, GFR 30-59 ml/min (HCC)    Atrial fibrillation, chronic (HCC)    Obesity (BMI 30-39. 9)    Hyperlipidemia LDL goal <100    Bilateral carotid artery stenosis    Hyperkalemia  Resolved Problems:    * No resolved hospital problems. *         DIET:    ADULT DIET; Regular;  No Added Salt (3-4 gm)  Adult Oral Nutrition Supplement; Protein Modular     MEDS (scheduled):    amLODIPine  5 mg Oral Daily    sodium zirconium cyclosilicate  5 g Oral TID    ferric gluconate (FERRLECIT) IVPB  100 mg Intravenous Once    Followed by    ferric gluconate (FERRLECIT) IVPB  125 mg Intravenous Daily    sodium chloride flush  10 mL Intravenous 2 times per day    allopurinol  300 mg Oral Daily    hydrALAZINE  75 mg Oral TID    levothyroxine  25 mcg Oral Daily    hydrOXYzine  10 mg Oral Nightly    metoprolol succinate  50 mg Oral Daily    pantoprazole  40 mg Oral Daily    primidone  100 mg Oral BID    sodium bicarbonate  650 mg Oral BID    tamsulosin  0.4 mg Oral Daily    vitamin D  2,000 Units Oral Daily       MEDS (infusions):   sodium chloride         MEDS (prn):  acetaminophen, sodium chloride flush, sodium chloride, ondansetron **OR** ondansetron, senna, perflutren lipid microspheres, dextrose    PHYSICAL EXAM:     Patient Vitals for the past 24 hrs:   BP Temp Temp src Pulse Resp SpO2 Weight   06/19/21 0800 (!) 153/66 98.7 °F (37.1 °C) Oral 79 18 96 % --   06/19/21 0618 -- -- -- -- -- -- 243 lb 9.6 oz (110.5 kg)   06/19/21 0030 132/64 97.8 °F (36.6 °C) Oral 70 18 95 % --   06/18/21 2030 (!) 166/74 97.3 °F (36.3 °C) Oral 80 18 96 % --   @      Intake/Output Summary (Last 24 hours) at 6/19/2021 1343  Last data filed at 6/19/2021 1056  Gross per 24 hour   Intake 325 ml   Output --   Net 325 ml         Wt Readings from Last 3 Encounters:   06/19/21 243 lb 9.6 oz (110.5 kg)   05/29/21 220 lb (99.8 kg)   03/03/21 225 lb (102.1 kg)       Constitutional:  in no acute distress  HEENT: NC/AT, EOMI, sclera and conjunctiva are clear and anicteric, mucus membranes moist  Neck: Trachea midline, no JVD  Cardiovascular: S1, S2 regular rhythm, no murmur,or rub  Respiratory:  No crackles, no wheeze  Gastrointestinal:  Soft, nontender, nondistended, NABS  Ext: no edema, feet warm  Skin: dry, no rash  Neuro: awake, alert, interactive      DATA:    Recent Labs     06/17/21  0650 06/18/21  0430 06/19/21  0700   WBC 6.3 6.7 5.9   HGB 8.9* 8.9* 9.0*   HCT 27.7* 26.9* 27.0*   .1* 105.5* 105.1*   * 125* 145     Recent Labs     06/17/21  0650 06/18/21  0430 06/19/21  0700    142 141   K 5.1*  5.1* 5.1* 4.6   * 110* 110*   CO2 20* 22 21*   MG 2.2  --   --    PHOS 3.9 4.4  --    BUN 95* 99* 86*   CREATININE 3.8* 3.7* 3.3*   ALT 12 10 11   AST 27 28 30   BILITOT 0.8 0.8 0.6   ALKPHOS 229* 244* 247*       Lab Results   Component Value Date    LABPROT 0.3 (H) 06/15/2021    LABPROT 0.3 06/15/2021    LABPROT 0.4 (H) 12/20/2019    LABPROT 0.4 12/20/2019       ASSESSMENT / RECOMMENDATIONS:    .  Acute kidney injury, likely hemodynamically mediated due to intravascular volume contraction associated with severe ascites and third spacing. Urinary indices are consistent with prerenal azotemia. Random urine protein creatinine ratio is 0.3 (consistent with roughly 300 mg/day of proteinuria, which is mild).     2. Chronic kidney disease, baseline serum creatinine has worsened in the past year, and likely is 2.1 to 2.4 mg/dL. Etiology is diabetic nephropathy, renal microvascular atherosclerotic disease     3. Hyperkalemia, secondary to acute kidney injury, decreased effective circulating volume     4. Anasarca with considerable ascites. With 0.3 g/day estimated proteinuria, not nephrotic syndrome. Liver on CT scan does not look cirrhotic. Consider severe diastolic dysfunction versus LV dysfunction     5. Ascites. Status post diagnostic paracentesis this morning. Results pending     6. Anemia at least in part if not in full secondary to chronic kidney disease, though need to consider occult GI bleed. No melena or hematochezia.              continue lokelma   Stop iv fluids   We will start diuresis today , consider initiating higher dosages as needed meds clinical status.   Cut norvasac to 5 mg daily (might help his LE swelling )   Am labs      Electronically signed by Migel Parada MD on 6/19/2021

## 2021-06-19 NOTE — PROGRESS NOTES
Believes his swelling worse since admission and suspect this is correct  He is scheduled for paracentesis on Monday but this will provide only mild and temporary improvement  Generalized anasarca more the issue   Not certain what can be offered , I see right heart catherization has been mentioned.

## 2021-06-19 NOTE — PLAN OF CARE
Problem: Falls - Risk of:  Goal: Will remain free from falls  Description: Will remain free from falls  6/19/2021 0949 by Keith Jefferson RN  Outcome: Ongoing  6/19/2021 0033 by Corie Fatima RN  Outcome: Met This Shift  Goal: Absence of physical injury  Description: Absence of physical injury  6/19/2021 0949 by Keith Jefferson RN  Outcome: Ongoing  6/19/2021 0033 by Corie Fatima RN  Outcome: Met This Shift

## 2021-06-20 NOTE — PROGRESS NOTES
[facility] MD Aaron Allen MD Randel Brunt, MD Penne Southerly, MD Kiersten Acosta, CNP   Anna Meadows, NATALIE  Progress Note    6/20/2021    SUBJECTIVE:   6/16: Feeling better. With edema better now that he had his lymphedema wraps in place. No dyspnea at rest on room air. Ongoing fatigue and generalized weakness. Continued abdominal distention, no pain. No cp/palp Appetite ok  6/17: \"Very tired\" did not sleep very well last night. No dyspnea at rest supine on room air. Substantial lower extremity edema persists lymphedema distally somewhat better, though abdominal girth worse than yesterday. 6/18 : seen today on RA , weak , LE swelling noted . 6/19: Patient was seen and examined today case was discussed thoroughly with the nurse, patient still has severe swelling in his lower extremities with ascites noted probably old right heart failure this patient that has also bilateral ventricular failures, will start diuretics today with Bumex and follow clinically course  6/20: No major change in clinical status with labs almost the same vital signs stable afebrile BUN today is 87 and creatinine is down to 3.1. Dr. Grant Markham will be rounding tomorrow    PROBLEM LIST:    Principal Problem:    KEANU (acute kidney injury) ([facility] [address]  Active Problems:    Ascites    HTN (hypertension), benign    CKD (chronic kidney disease) stage 3, GFR 30-59 ml/min (HCC)    Atrial fibrillation, chronic (HCC)    Obesity (BMI 30-39. 9)    Hyperlipidemia LDL goal <100    Bilateral carotid artery stenosis    Hyperkalemia  Resolved Problems:    * No resolved hospital problems. *         DIET:    ADULT DIET; Regular;  No Added Salt (3-4 gm)  Adult Oral Nutrition Supplement; Protein Modular     MEDS (scheduled):    ipratropium-albuterol  1 ampule Inhalation Q4H WA    bumetanide  2 mg Intravenous BID    amLODIPine  5 mg Oral Daily    sodium zirconium cyclosilicate  5 g Oral TID    ferric gluconate (FERRLECIT) IVPB  100 mg Intravenous Once    sodium chloride flush  10 mL Intravenous 2 times per day    allopurinol  300 mg Oral Daily    hydrALAZINE  75 mg Oral TID    levothyroxine  25 mcg Oral Daily    hydrOXYzine  10 mg Oral Nightly    metoprolol succinate  50 mg Oral Daily    pantoprazole  40 mg Oral Daily    primidone  100 mg Oral BID    sodium bicarbonate  650 mg Oral BID    tamsulosin  0.4 mg Oral Daily    vitamin D  2,000 Units Oral Daily       MEDS (infusions):   sodium chloride         MEDS (prn):  acetaminophen, sodium chloride flush, sodium chloride, ondansetron **OR** ondansetron, senna, perflutren lipid microspheres, dextrose    PHYSICAL EXAM:     Patient Vitals for the past 24 hrs:   BP Temp Temp src Pulse Resp SpO2 Weight   06/20/21 1245 -- -- -- -- -- 96 % --   06/20/21 0815 (!) 158/71 98.2 °F (36.8 °C) -- 83 18 95 % --   06/20/21 0536 -- -- -- -- -- -- 242 lb 9.6 oz (110 kg)   06/20/21 0015 (!) 164/70 97.6 °F (36.4 °C) Oral 61 18 96 % --   06/19/21 2015 (!) 176/74 97.8 °F (36.6 °C) Oral 75 18 95 % --   06/19/21 1600 (!) 138/52 98.2 °F (36.8 °C) Oral 70 18 96 % --   @      Intake/Output Summary (Last 24 hours) at 6/20/2021 1328  Last data filed at 6/20/2021 1242  Gross per 24 hour   Intake 360 ml   Output 200 ml   Net 160 ml         Wt Readings from Last 3 Encounters:   06/20/21 242 lb 9.6 oz (110 kg)   05/29/21 220 lb (99.8 kg)   03/03/21 225 lb (102.1 kg)       Constitutional:  in no acute distress  HEENT: NC/AT, EOMI, sclera and conjunctiva are clear and anicteric, mucus membranes moist  Neck: Trachea midline, no JVD  Cardiovascular: S1, S2 regular rhythm, no murmur,or rub  Respiratory:  No crackles, no wheeze  Gastrointestinal:  Soft, nontender, nondistended, NABS  Ext: no edema, feet warm  Skin: dry, no rash  Neuro: awake, alert, interactive      DATA:    Recent Labs     06/18/21  0430 06/19/21  0700 06/20/21  0720   WBC 6.7 5.9 5.5   HGB 8.9* 9.0* 8.8*   HCT 26.9* 27.0* 27.8*   .5* 105.1* 106.9*   * 145 151     Recent Labs     06/18/21  0430 06/19/21  0700 06/20/21  0720    141 141   K 5.1* 4.6 4.4   * 110* 110*   CO2 22 21* 21*   PHOS 4.4  --   --    BUN 99* 86* 87*   CREATININE 3.7* 3.3* 3.1*   ALT 10 11 12   AST 28 30 30   BILITOT 0.8 0.6 0.5   ALKPHOS 244* 247* 249*       Lab Results   Component Value Date    LABPROT 0.3 (H) 06/15/2021    LABPROT 0.3 06/15/2021    LABPROT 0.4 (H) 12/20/2019    LABPROT 0.4 12/20/2019       ASSESSMENT / RECOMMENDATIONS:    .  Acute kidney injury, likely hemodynamically mediated due to intravascular volume contraction associated with severe ascites and third spacing. Urinary indices are consistent with prerenal azotemia. Random urine protein creatinine ratio is 0.3 (consistent with roughly 300 mg/day of proteinuria, which is mild).     2. Chronic kidney disease, baseline serum creatinine has worsened in the past year, and likely is 2.1 to 2.4 mg/dL. Etiology is diabetic nephropathy, renal microvascular atherosclerotic disease     3. Hyperkalemia, secondary to acute kidney injury, decreased effective circulating volume     4. Anasarca with considerable ascites. With 0.3 g/day estimated proteinuria, not nephrotic syndrome. Liver on CT scan does not look cirrhotic. Consider severe diastolic dysfunction versus LV dysfunction     5. Ascites. Status post diagnostic paracentesis this morning. Results pending     6. Anemia at least in part if not in full secondary to chronic kidney disease, though need to consider occult GI bleed. No melena or hematochezia.              continue lokelma   Stop iv fluids   We will start diuresis today , consider initiating higher dosages as needed meds clinical status.   Cut norvasac to 5 mg daily (might help his LE swelling )   Am labs      Electronically signed by Sonia Corea MD on 6/20/2021

## 2021-06-20 NOTE — PROGRESS NOTES
Internal Medicine Progress Note      Synopsis: Patient admitted on 6/15/2021   Javon Blanchard is a 78y.o. year old male. He presented to the hospital with lower extremity edema and abdominal swelling. He was seen in the emergency department. He was sent home after being found to have ascites. He had an elective, outpatient paracentesis due to the ascites on 6/15. Labs revealed hyperkalemia as well as acute kidney injury. The general surgeon that performed the procedure called me and asked me to directly admit the patient. The patient does follow with nephrology on outpatient basis. His symptoms of abdominal distention and lower extremity edema have been going on for a couple of weeks. These symptoms are moderate in severity. Symptoms are made better by nothing. Symptoms are made worse by nothing. Associated symptoms include lower extremity edema and poor appetite. The patient granddaughter was present at bedside. History is provided by the granddaughter. She is felt to be an excellent historian. Subjective    Laying in bed. Quite alert. Talking pretty well. Granddaughter at bedside   Tells me he is waiting for his paracentesis to be done on Monday again  June 20, 2021  Called by staff earlier that he is wheezing. Chest x-ray was ordered and also nephrology was consulted regarding IV diuresis to see if that would help. Aerosols were ordered as well      Exam:  BP (!) 158/71   Pulse 83   Temp 98.2 °F (36.8 °C)   Resp 18   Ht 5' 9\" (1.753 m)   Wt 242 lb 9.6 oz (110 kg)   SpO2 96%   BMI 35.83 kg/m²   General appearance: No apparent distress, appears stated age and cooperative. HEENT: Pupils equal, round, and reactive to light. Conjunctivae/corneas clear. Neck: Supple. No jugular venous distention. Trachea midline. Respiratory:   Distant and decreased  Cardiovascular: Regular rate and rhythm with normal S1/S2 without murmurs, rubs or gallops.   Abdomen: Ascites positive   musculoskeletal: No clubbing, cyanosis 2+ edema bilateral lower extremities brisk capillary refill. Skin:  No rashes    Neurologic: awake, alert and following commands     Medications:  Reviewed    Infusion Medications    sodium chloride       Scheduled Medications    ipratropium-albuterol  1 ampule Inhalation Q4H WA    bumetanide  2 mg Intravenous BID    amLODIPine  5 mg Oral Daily    sodium zirconium cyclosilicate  5 g Oral TID    ferric gluconate (FERRLECIT) IVPB  100 mg Intravenous Once    sodium chloride flush  10 mL Intravenous 2 times per day    allopurinol  300 mg Oral Daily    hydrALAZINE  75 mg Oral TID    levothyroxine  25 mcg Oral Daily    hydrOXYzine  10 mg Oral Nightly    metoprolol succinate  50 mg Oral Daily    pantoprazole  40 mg Oral Daily    primidone  100 mg Oral BID    sodium bicarbonate  650 mg Oral BID    tamsulosin  0.4 mg Oral Daily    vitamin D  2,000 Units Oral Daily     PRN Meds: acetaminophen, sodium chloride flush, sodium chloride, ondansetron **OR** ondansetron, senna, perflutren lipid microspheres, dextrose    I/O    Intake/Output Summary (Last 24 hours) at 6/20/2021 1419  Last data filed at 6/20/2021 1331  Gross per 24 hour   Intake 360 ml   Output 200 ml   Net 160 ml       Labs:   Recent Labs     06/18/21  0430 06/19/21  0700 06/20/21  0720   WBC 6.7 5.9 5.5   HGB 8.9* 9.0* 8.8*   HCT 26.9* 27.0* 27.8*   * 145 151       Recent Labs     06/18/21  0430 06/19/21  0700 06/20/21  0720    141 141   K 5.1* 4.6 4.4   * 110* 110*   CO2 22 21* 21*   BUN 99* 86* 87*   CREATININE 3.7* 3.3* 3.1*   CALCIUM 9.0 8.9 8.8   PHOS 4.4  --   --        Recent Labs     06/18/21  0430 06/19/21  0700 06/20/21  0720   PROT 6.6 6.1* 6.1*   ALKPHOS 244* 247* 249*   ALT 10 11 12   AST 28 30 30   BILITOT 0.8 0.6 0.5       No results for input(s): INR in the last 72 hours. No results for input(s): Britt Martinez in the last 72 hours.     Chronic labs:  Lab Results   Component Value Date    CHOL 111 12/18/2014    TRIG 87 12/18/2014    HDL 33 12/18/2014    LDLCALC 60 12/18/2014    TSH 0.728 12/17/2014    INR 1.2 06/15/2021       Radiology:  CT ABDOMEN PELVIS WO CONTRAST Additional Contrast? None    Result Date: 5/29/2021  1. No evidence of bowel obstruction. 2. Moderate to large amount of ascites. Findings consistent with anasarca. 3. Moderate left and small right pleural effusions with mild left basilar atelectasis. 4. Slightly nodular contour of the liver that can be associated with cirrhosis although this is equivocal. 5. Sigmoid diverticulosis. No evidence of diverticulitis. XR CHEST PORTABLE    Result Date: 5/29/2021  Borderline size heart. Slightly prominent perihilar vessels. The cannot exclude minimal left-sided pleural effusion. US ABDOMEN LIMITED    Result Date: 6/18/2021  Moderate abdominal ascites throughout the abdomen. US RETROPERITONEAL COMPLETE    Result Date: 6/15/2021  No sonographic evidence of acute renal pathology, mass, or calculus. Abdomen and pelvic ascites     US DUP LOWER EXTREMITIES BILATERAL VENOUS    Result Date: 6/18/2021  No evidence of DVT in either lower extremity. XR CHEST PORTABLE    Result Date: 6/20/2021  EXAMINATION: ONE XRAY VIEW OF THE CHEST 6/20/2021 9:45 am COMPARISON: 05/29/2021 HISTORY: ORDERING SYSTEM PROVIDED HISTORY: exp wheezes TECHNOLOGIST PROVIDED HISTORY: Reason for exam:->exp wheezes FINDINGS: Stable cardiomediastinal silhouette. Prominent central vessels and prominent bilateral reticular markings. Patchy perihilar opacities. No pneumothorax. Possible small bilateral pleural effusions. No acute osseous abnormality. Findings may be related to mild congestive heart failure with interstitial pulmonary edema.      US DUP LOWER EXTREMITIES BILATERAL VENOUS    Result Date: 6/18/2021  EXAMINATION: DUPLEX VENOUS ULTRASOUND OF THE BILATERAL LOWER EXTREMITIES, 6/18/2021 7:45 pm TECHNIQUE: Duplex ultrasound using B-mode/gray scaled imaging and Doppler spectral analysis and color flow was obtained of the bilateral lower extremities. COMPARISON: None. HISTORY: ORDERING SYSTEM PROVIDED HISTORY: dvt TECHNOLOGIST PROVIDED HISTORY: Reason for exam:->dvt What reading provider will be dictating this exam?->CRC FINDINGS: Due to anasarca evaluation is somewhat limited. The visualized veins of the bilateral lower extremities are patent and free of echogenic thrombus. The veins demonstrate good compressibility with normal color flow study and spectral analysis. No evidence of DVT in either lower extremity. ASSESSMENT:  Pulmonary hypertension  Principal Problem:    KEANU (acute kidney injury) ([address]  Active Problems:    Ascites    HTN (hypertension), benign    CKD (chronic kidney disease) stage 3, GFR 30-59 ml/min (HCC)    Atrial fibrillation, chronic (HCC)    Obesity (BMI 30-39. 9)    Hyperlipidemia LDL goal <100    Bilateral carotid artery stenosis    Hyperkalemia  Resolved Problems:    * No resolved hospital problems. *       PLAN:  1. Paracentesis again on Monday  2. CKD seems to be stable including potassium creatinine is at 3.3  3. Maintain premorbid medications for heart rate control and proton pump inhibitor for acid reflux and hyperuricemia to be treated with allopurinol and hypertension control with nonnephrotoxic drugs  4, significant pulmonary hypertension and ?need for right-sided heart catheterization so we will need cardiology  June 20, 2021  Aerosols ordered. Chest x-ray consistent with mild CHF  No new O2 needs yet  Hemoglobin 8.8  Creatinine better 3.1 but he started to look fluid overloaded. Bumex still continues  Await paracentesis in the morning  Diet: ADULT DIET; Regular;  No Added Salt (3-4 gm)  Adult Oral Nutrition Supplement; Protein Modular  Code Status: Full Code  PT/OT Eval Status:   order  DVT Prophylaxis:    was in Eliquis and it is on hold for paracentesis  Recommended disposition at discharge:    Not sure yet  +++++++++++++++++++++++++++++++++++++++++++++++++  Moncho Ulloa MD   [facility].  +++++++++++++++++++++++++++++++++++++++++++++++++  NOTE: This report was transcribed using voice recognition software. Every effort was made to ensure accuracy; however, inadvertent computerized transcription errors may be present.

## 2021-06-20 NOTE — PROGRESS NOTES
06/20/21  0720   WBC 6.7 5.9 5.5   HGB 8.9* 9.0* 8.8*   HCT 26.9* 27.0* 27.8*   .5* 105.1* 106.9*   * 145 151       BMP:  Recent Labs     06/18/21  0430 06/19/21  0700 06/20/21  0720    141 141   K 5.1* 4.6 4.4   * 110* 110*   CO2 22 21* 21*   PHOS 4.4  --   --    BUN 99* 86* 87*   CREATININE 3.7* 3.3* 3.1*    ALB:3,BILIDIR:3,BILITOT:3,ALKPHOS:3)@    PT/INR: No results for input(s): PROTIME, INR in the last 72 hours. ABG:   No results for input(s): PH, PO2, PCO2, HCO3, BE, O2SAT, METHB, O2HB, COHB, O2CON, HHB, THB in the last 72 hours. Radiology/Other tests reviewed: none    Assessment:     Principal Problem:    KEANU (acute kidney injury) ([facility] [address]  Active Problems:    Ascites    HTN (hypertension), benign    CKD (chronic kidney disease) stage 3, GFR 30-59 ml/min (HCC)    Atrial fibrillation, chronic (HCC)    Obesity (BMI 30-39. 9)    Hyperlipidemia LDL goal <100    Bilateral carotid artery stenosis    Hyperkalemia  Resolved Problems:    * No resolved hospital problems. *      Plan:       1. Watch fluid balance, diurese as needed, CKD as per Renal  2. On RA and no lung medications, observe respiratory function and oxygenation  3. CT abdomen last month with moderate left pleural effusion and ascitis. (?) paracentesis tomorrow, observe effusion if will still require thoracentesis or can hold off  4. (?) RHC to assess pulmonary HTN and options for treatment      Time at the bedside, reviewing labs and radiographs, reviewing notes and consultations, discussing with staff and family was more than 35 minutes. Thanks for letting us see this patient in consultation. Please contact us with any questions. Office [phone] or after hours through [employer], [phone].

## 2021-06-20 NOTE — PROGRESS NOTES
PROGRESS NOTE       PATIENT PROBLEM LIST:  Principal Problem:    KEANU (acute kidney injury) ([address]  Active Problems:    Ascites    HTN (hypertension), benign    CKD (chronic kidney disease) stage 3, GFR 30-59 ml/min (HCC)    Atrial fibrillation, chronic (HCC)    Obesity (BMI 30-39. 9)    Hyperlipidemia LDL goal <100    Bilateral carotid artery stenosis    Hyperkalemia  Resolved Problems:    * No resolved hospital problems. *      SUBJECTIVE:  Recardo Ruthie states ***    REVIEW OF SYSTEMS:  General ROS: negative for - fatigue, malaise,  weight gain or weight loss  Psychological ROS: negative for - anxiety , depression  Ophthalmic ROS: negative for - decreased vision or visual distortion. ENT ROS: negative  Allergy and Immunology ROS: negative  Hematological and Lymphatic ROS: negative  Endocrine: no heat or cold intolerance and no polyphagia, polydipsia, or polyuria  Respiratory ROS: positive for - {rosresp symptoms:205277}  Cardiovascular ROS: positive for - {roscv symptoms:905142}. Gastrointestinal ROS: no abdominal pain, change in bowel habits, or black or bloody stools  Genito-Urinary ROS: no nocturia, dysuria, trouble voiding, frequency or hematuria  Musculoskeletal ROS: negative for- myalgias, arthralgias, or claudication  Neurological ROS: no TIA or stroke symptoms otherwise no significant change in symptoms or problems since yesterday as documented in previous progress notes.     SCHEDULED MEDICATIONS:   amLODIPine  5 mg Oral Daily    sodium zirconium cyclosilicate  5 g Oral TID    ferric gluconate (FERRLECIT) IVPB  100 mg Intravenous Once    sodium chloride flush  10 mL Intravenous 2 times per day    allopurinol  300 mg Oral Daily    hydrALAZINE  75 mg Oral TID    levothyroxine  25 mcg Oral Daily    hydrOXYzine  10 mg Oral Nightly    metoprolol succinate  50 mg Oral Daily    pantoprazole  40 mg Oral Daily    primidone  100 mg Oral BID    sodium bicarbonate  650 mg Oral BID    tamsulosin  0.4 mg Oral Daily    vitamin D  2,000 Units Oral Daily       VITAL SIGNS:                                                                                                                          BP (!) 176/74   Pulse 75   Temp 97.8 °F (36.6 °C) (Oral)   Resp 18   Ht 5' 9\" (1.753 m)   Wt 243 lb 9.6 oz (110.5 kg)   SpO2 95%   BMI 35.97 kg/m²   Patient Vitals for the past 96 hrs (Last 3 readings):   Weight   06/19/21 0618 243 lb 9.6 oz (110.5 kg)   06/18/21 0541 243 lb 12.8 oz (110.6 kg)   06/16/21 2315 243 lb 12.8 oz (110.6 kg)     OBJECTIVE:    HEENT: PERRL, EOM  Intact; sclera non-icteric, conjunctiva pink. Carotids are brisk in upstroke with normal contour. No carotid bruits. Normal jugular venous pulsation at 45°. No palpable cervical nor supraclavicular nodes. Thyroid not palpable. Trachea midline. Chest: Even excursion  Lungs: CTA B, no expiratory wheezes or rhonchi, no decreased tactile fremitus without inspiratory rales. Heart: Regular  rhythm; S1 > S2, no gallop or murmur. No clicks, rub, palpable thrills   or heaves. PMI nondisplaced, 5th intercostal space MCL. Abdomen: Soft, nontender, nondistended,  {Blank single:41721::\"scaphoid\",\"mildly protuberant\",\"moderately protuberant\",\"grossly protuberant\"}, no masses or organomegaly. Bowel sounds active. Extremities: Without clubbing, cyanosis or edema. Pulses present 3+ upper extermities bilaterally; {POSITIVE-NEGATIVE PULSES:52581} DP and {POSITIVE-NEGATIVE PULSES:62124} PT bilaterally.      Data:   Scheduled Meds: Reviewed  Continuous Infusions:    sodium chloride         Intake/Output Summary (Last 24 hours) at 6/20/2021 0027  Last data filed at 6/19/2021 2116  Gross per 24 hour   Intake 495 ml   Output    Net 495 ml     CBC:   Recent Labs     06/17/21  0650 06/18/21  0430 06/19/21  0700   WBC 6.3 6.7 5.9   HGB 8.9* 8.9* 9.0*   HCT 27.7* 26.9* 27.0*   * 125* 145     BMP:  Recent Labs     06/17/21  0650 06/18/21  0430 06/19/21  0700    142 thickness. Doppler/Tissue doppler not obtained. Estimated left ventricular ejection fraction is 45±5%. Right Ventricle  Moderately dilated right ventricle. TAPSE is decreased  Right ventricle global systolic function is moderately reduced . Left Atrium  The left atrium is moderately dilated. The LAESV Index is >34 ml/m2. Interatrial septum appears intact. Right Atrium  Mild-moderately enlarged right atrium. Mitral Valve  Structurally normal mitral valve. Physiologic and/or trace mitral regurgitation is present. Tricuspid Valve  The tricuspid valve appears structurally normal.  Moderate to severe tricuspid regurgitation. Pulmonary hypertension is severe . RVSP is >69 mmHg. Pulmonary hypertension is severe . Aortic Valve  The aortic valve is trileaflet. Decreased aortic valve leaflet excursion. The aortic valve appears moderately sclerotic. Mild aortic stenosis is present. Pulmonic Valve  Pulmonic valve is structurally normal.  Mild pulmonic regurgitation present. Pericardial Effusion  No evidence of pericardial thickening/calcification present. No evidence of pericardial effusion. Aorta  Aortic root dimension within normal limits. Ascending aorta appears mildly sclerotic and/or calcified. Miscellaneous  Dilated Inferior Vena Cava. Inferior Vena Cava, no respiratory variation. Conclusions   Summary  Left ventricle grossly normal in size. Early paradoxical septal motion. Normal left ventricular wall thickness. Doppler/Tissue doppler not obtained. Estimated left ventricular ejection fraction is 45±5%. The LAESV Index is >34 ml/m2. Moderately dilated right ventricle. TAPSE is decreased  Right ventricle global systolic function is moderately reduced . Physiologic and/or trace mitral regurgitation is present. The aortic valve appears moderately sclerotic. Mild aortic stenosis is present. Moderate to severe tricuspid regurgitation. Pulmonary hypertension is severe .   RVSP is >69 mmHg. Pulmonary hypertension is severe . Mild pulmonic regurgitation present. Technically good quality study. Technically difficult study due to patient''s body habitus. Compared to prior echo, changes noted. Suggest clinical correlation.    Signature   ----------------------------------------------------------------  Electronically signed by Crow Laughlin DO(Interpreting  physician) on 06/17/2021 03:35 PM  ----------------------------------------------------------------  M-Mode/2D Measurements & Calculations   LV Diastolic    LV Systolic Dimension: 3.5   AV Cusp Separation: 1.2 cmLA  Dimension: 4.6  cm                           Dimension: 5.8 cmAO Root  cm              LV Volume Diastolic: 80.8 ml Dimension: 3.1 cm  LV FS:23.9 %    LV Volume Systolic: 00.9 ml  LV PW           LV EDV/LV EDV Index: 78.9  Diastolic: 1.1  PL/61 ND/F^0KG ESV/LV ESV  cm              Index: 51.2 ml/23ml/ m^2     RV Diastolic Dimension: 4.7  LV PW Systolic: EF Calculated: 16.2 %        cm  1.2 cm          LV Mass Index: 81 l/min*m^2  Septum                                       LA/Aorta: 6.25  Diastolic: 1.1                               Ascending Aorta: 2.9 cm  cm              LVOT: 2 cm                   LA volume/Index: 114 ml  Septum                                       /10EH/S^6  Systolic: 1.2                                RA Area: 28 cm^2  cm  CO: 5.2 l/min                                IVC Expiration: 3 cm  CI: 2.32  l/m*m^2  LV Mass: 181.22  g  Doppler Measurements & Calculations   MV Peak E-Wave: 1.31 AV Peak Velocity:     LVOT Peak Velocity: 1.08 m/s  m/s                  2.09 m/s              LVOT Mean Velocity: 0.78 m/s                       AV Peak Gradient:     LVOT Peak Gradient: 4.6  MV Peak Gradient:    17.39 mmHg            mmHgLVOT Mean Gradient: 2.5  6.8 mmHg             AV Mean Velocity:     mmHg  MV Mean Gradient:    1.48 m/s              Estimated RVSP: 69.4 mmHg  2.9 mmHg             AV Mean Gradient: Estimated RAP:15 mmHg  MV Mean Velocity:    10.2 mmHg  0.77 m/s             AV VTI: 44.3 cm  MV Deceleration      AV Area               TR Velocity:3.69 m/s  Time: 219.7 msec     (Continuity):1.52     TR Gradient:54.43 mmHg  MV P1/2t: 62.8 msec  cm^2                  PV Peak Velocity: 0.95 m/s  MVA by PHT:3.5 cm^2                        PV Peak Gradient: 3.57 mmHg  MV Area              LVOT VTI: 21.5 cm     PV Mean Velocity: 0.61 m/s  (continuity): 3.6                          PV Mean Gradient: 1.8 mmHg  cm^2                 Estimated PASP: 69.43  MV E' Septal         mmHg  Velocity: 0.08 m/s  MV E' Lateral  Velocity: 14 m/s  [email or web address] [id number]    CT ABDOMEN PELVIS WO CONTRAST Additional Contrast? None    Result Date: 5/29/2021  EXAMINATION: CT OF THE ABDOMEN AND PELVIS WITHOUT CONTRAST 5/29/2021 8:18 am TECHNIQUE: CT of the abdomen and pelvis was performed without the administration of intravenous contrast. Multiplanar reformatted images are provided for review. Dose modulation, iterative reconstruction, and/or weight based adjustment of the mA/kV was utilized to reduce the radiation dose to as low as reasonably achievable. COMPARISON: CT abdomen and pelvis dated 12/19/2019 HISTORY: ORDERING SYSTEM PROVIDED HISTORY: Abdominal distention TECHNOLOGIST PROVIDED HISTORY: Reason for exam:->Abdominal distention Additional Contrast?->None Decision Support Exception - unselect if not a suspected or confirmed emergency medical condition->Emergency Medical Condition (MA) FINDINGS: Lower Chest: There are moderate left and small right pleural effusions with some compression atelectasis in the left lower lobe. No definite acute airspace disease is seen at either lung base. The heart is top-normal in size. There are mild coronary artery calcifications which is a marker for coronary atherosclerotic disease. Organs: No obvious hepatic, splenic, pancreatic, adrenal or renal mass is seen. A lesion could be missed in the absence of IV contrast.  There is a stable approximately 2 cm hypodensity in the posterior aspect of the lower pole of the right kidney, consistent with a cyst.  There is no evidence of hydronephrosis or renal calculi. The gallbladder is surgically absent. The liver shows slightly nodular contour that can be associated with cirrhosis. GI/Bowel: There is no evidence of bowel obstruction. No evidence of abnormal bowel wall thickening or distension. There is sigmoid diverticulosis without evidence of diverticulitis. Pelvis: Moderate to large amount of free fluid is seen in the pelvis. No pelvic mass or lymphadenopathy is seen. Peritoneum/Retroperitoneum: There is moderate to large amount of ascites. No evidence of retroperitoneal lymphadenopathy. There is no evidence of intraperitoneal lymphadenopathy. Prominent calcified plaque is seen in the abdominal aorta and iliac vessels with no evidence of aneurysm. The bilateral iliac stents are noted. Bones/Soft Tissues:  Age related degenerative changes of the visualized osseous structures without focal destructive lesion. There is diffuse subcutaneous edema, consistent with anasarca. 1. No evidence of bowel obstruction. 2. Moderate to large amount of ascites. Findings consistent with anasarca. 3. Moderate left and small right pleural effusions with mild left basilar atelectasis. 4. Slightly nodular contour of the liver that can be associated with cirrhosis although this is equivocal. 5. Sigmoid diverticulosis. No evidence of diverticulitis.      XR CHEST PORTABLE    Result Date: 5/29/2021  EXAMINATION: ONE XRAY VIEW OF THE CHEST 5/29/2021 8:18 am COMPARISON: February 12, 2019 HISTORY: ORDERING SYSTEM PROVIDED HISTORY: Shortness of breath TECHNOLOGIST PROVIDED HISTORY: Reason for exam:->Shortness of breath FINDINGS: The heart has upper borderline size.  There is some relative prominence of the central perihilar vessels. No acute infiltrates or consolidations are seen. There is minimal blunting of the left-sided costophrenic sulcus which could represent minimal left-sided pleural effusion. Calcified granuloma likely seen in the right base. Borderline size heart. Slightly prominent perihilar vessels. The cannot exclude minimal left-sided pleural effusion. US ABDOMEN LIMITED    Result Date: 6/18/2021  EXAMINATION: LIMITED ABDOMINAL ULTRASOUND 6/18/2021 12:09 pm COMPARISON: None. HISTORY: ORDERING SYSTEM PROVIDED HISTORY: ascites TECHNOLOGIST PROVIDED HISTORY: Reason for exam:->ascites What reading provider will be dictating this exam?->CRC FINDINGS: Ascites survey of the 4 quadrants of the abdomen was performed. There is a moderate amount of ascites throughout the abdomen most pronounced in the left lower quadrant. Incidental note is made of a small right pleural effusion. Moderate abdominal ascites throughout the abdomen. US RETROPERITONEAL COMPLETE    Result Date: 6/15/2021  EXAMINATION: RETROPERITONEAL ULTRASOUND OF THE KIDNEYS AND URINARY BLADDER 6/15/2021 COMPARISON: AP CT 05/29/2021 HISTORY: ORDERING SYSTEM PROVIDED HISTORY: rigo TECHNOLOGIST PROVIDED HISTORY: Ascites Reason for exam:->rigo What reading provider will be dictating this exam?->CRC FINDINGS: RENAL MEASUREMENTS: Length of visualized right kidney: 10.8 cm. Right renal cortical thickness: 17 mm. Length of visualized left kidney: 11.3 cm. Left renal cortical thickness: 18 mm. RIGHT KIDNEY: Focal parenchymal or cortical lesion: A non-specific, smoothly marginated, hypoechoic lesion is statistically most likely a cyst.  There is no evidence of mural nodularity, septation, wall thickening, or calcification. It measures 18 mm and is again noted in the right renal lower pole. Possible calculus: None. Hydronephrosis: None. Perinephric fluid: None.  LEFT KIDNEY: Upper pole not well visualized sonographically. Focal parenchymal or cortical lesion: None. Possible calculus: None. Hydronephrosis: None. Perinephric fluid: None. URINARY BLADDER: Focal abnormality: None. Wall thickening: None. Prevoid volume: 196cc. Slight to moderate right upper quadrant and pelvic ascites. No sonographic evidence of acute renal pathology, mass, or calculus. Abdomen and pelvic ascites     US DUP LOWER EXTREMITIES BILATERAL VENOUS    Result Date: 6/18/2021  EXAMINATION: DUPLEX VENOUS ULTRASOUND OF THE BILATERAL LOWER EXTREMITIES, 6/18/2021 7:45 pm TECHNIQUE: Duplex ultrasound using B-mode/gray scaled imaging and Doppler spectral analysis and color flow was obtained of the bilateral lower extremities. COMPARISON: None. HISTORY: ORDERING SYSTEM PROVIDED HISTORY: dvt TECHNOLOGIST PROVIDED HISTORY: Reason for exam:->dvt What reading provider will be dictating this exam?->CRC FINDINGS: Due to anasarca evaluation is somewhat limited. The visualized veins of the bilateral lower extremities are patent and free of echogenic thrombus. The veins demonstrate good compressibility with normal color flow study and spectral analysis. No evidence of DVT in either lower extremity. EKG: See Report  Echo: See Report      IMPRESSIONS:  Principal Problem:    KEANU (acute kidney injury) ([address]  Active Problems:    Ascites    HTN (hypertension), benign    CKD (chronic kidney disease) stage 3, GFR 30-59 ml/min (HCC)    Atrial fibrillation, chronic (HCC)    Obesity (BMI 30-39. 9)    Hyperlipidemia LDL goal <100    Bilateral carotid artery stenosis    Hyperkalemia  Resolved Problems:    * No resolved hospital problems.  *      RECOMMENDATIONS:  ***    I have spent more than *** minutes face to face with Rj Childress and reviewing notes and laboratory data, with greater than 50% of this time instructing and counseling the patient *** face to face regarding my findings and recommendations and I have answered all questions as posed to

## 2021-06-20 NOTE — PROGRESS NOTES
PROGRESS NOTE       PATIENT PROBLEM LIST:  Principal Problem:    KEANU (acute kidney injury) ([facility] [address]  Active Problems:    Ascites    HTN (hypertension), benign    CKD (chronic kidney disease) stage 3, GFR 30-59 ml/min (HCC)    Atrial fibrillation, chronic (HCC)    Obesity (BMI 30-39. 9)    Hyperlipidemia LDL goal <100    Bilateral carotid artery stenosis    Hyperkalemia  Resolved Problems:    * No resolved hospital problems. *      SUBJECTIVE:  Lafrances Goring states ***    REVIEW OF SYSTEMS:  General ROS: negative for - fatigue, malaise,  weight gain or weight loss  Psychological ROS: negative for - anxiety , depression  Ophthalmic ROS: negative for - decreased vision or visual distortion. ENT ROS: negative  Allergy and Immunology ROS: negative  Hematological and Lymphatic ROS: negative  Endocrine: no heat or cold intolerance and no polyphagia, polydipsia, or polyuria  Respiratory ROS: positive for - {rosresp symptoms:001939}  Cardiovascular ROS: positive for - {roscv symptoms:051614}. Gastrointestinal ROS: no abdominal pain, change in bowel habits, or black or bloody stools  Genito-Urinary ROS: no nocturia, dysuria, trouble voiding, frequency or hematuria  Musculoskeletal ROS: negative for- myalgias, arthralgias, or claudication  Neurological ROS: no TIA or stroke symptoms otherwise no significant change in symptoms or problems since yesterday as documented in previous progress notes.     SCHEDULED MEDICATIONS:   ipratropium-albuterol  1 ampule Inhalation Q4H WA    bumetanide  2 mg Intravenous BID    amLODIPine  5 mg Oral Daily    sodium zirconium cyclosilicate  5 g Oral TID    ferric gluconate (FERRLECIT) IVPB  100 mg Intravenous Once    sodium chloride flush  10 mL Intravenous 2 times per day    allopurinol  300 mg Oral Daily    hydrALAZINE  75 mg Oral TID    levothyroxine  25 mcg Oral Daily    hydrOXYzine  10 mg Oral Nightly    metoprolol succinate  50 mg Oral Daily    pantoprazole  40 mg Oral Daily    primidone  100 mg Oral BID    sodium bicarbonate  650 mg Oral BID    tamsulosin  0.4 mg Oral Daily    vitamin D  2,000 Units Oral Daily       VITAL SIGNS:                                                                                                                          BP (!) 158/71   Pulse 83   Temp 98.2 °F (36.8 °C)   Resp 18   Ht 5' 9\" (1.753 m)   Wt 242 lb 9.6 oz (110 kg)   SpO2 96%   BMI 35.83 kg/m²   Patient Vitals for the past 96 hrs (Last 3 readings):   Weight   06/20/21 0536 242 lb 9.6 oz (110 kg)   06/19/21 0618 243 lb 9.6 oz (110.5 kg)   06/18/21 0541 243 lb 12.8 oz (110.6 kg)     OBJECTIVE:    HEENT: PERRL, EOM  Intact; sclera non-icteric, conjunctiva pink. Carotids are brisk in upstroke with normal contour. No carotid bruits. Normal jugular venous pulsation at 45°. No palpable cervical nor supraclavicular nodes. Thyroid not palpable. Trachea midline. Chest: Even excursion  Lungs: CTA B, no expiratory wheezes or rhonchi, no decreased tactile fremitus without inspiratory rales. Heart: Regular  rhythm; S1 > S2, no gallop or murmur. No clicks, rub, palpable thrills   or heaves. PMI nondisplaced, 5th intercostal space MCL. Abdomen: Soft, nontender, nondistended,  {Blank single:16135::\"scaphoid\",\"mildly protuberant\",\"moderately protuberant\",\"grossly protuberant\"}, no masses or organomegaly. Bowel sounds active. Extremities: Without clubbing, cyanosis or edema. Pulses present 3+ upper extermities bilaterally; {POSITIVE-NEGATIVE PULSES:17481} DP and {POSITIVE-NEGATIVE PULSES:74062} PT bilaterally.      Data:   Scheduled Meds: Reviewed  Continuous Infusions:    sodium chloride         Intake/Output Summary (Last 24 hours) at 6/20/2021 1326  Last data filed at 6/20/2021 1242  Gross per 24 hour   Intake 360 ml   Output 200 ml   Net 160 ml     CBC:   Recent Labs     06/18/21  0430 06/19/21  0700 06/20/21  0720   WBC 6.7 5.9 5.5   HGB 8.9* 9.0* 8.8*   HCT 26.9* 27.0* 27.8*   * 145 151 size.  Early paradoxical septal motion. Normal left ventricular wall thickness. Doppler/Tissue doppler not obtained. Estimated left ventricular ejection fraction is 45±5%. Right Ventricle  Moderately dilated right ventricle. TAPSE is decreased  Right ventricle global systolic function is moderately reduced . Left Atrium  The left atrium is moderately dilated. The LAESV Index is >34 ml/m2. Interatrial septum appears intact. Right Atrium  Mild-moderately enlarged right atrium. Mitral Valve  Structurally normal mitral valve. Physiologic and/or trace mitral regurgitation is present. Tricuspid Valve  The tricuspid valve appears structurally normal.  Moderate to severe tricuspid regurgitation. Pulmonary hypertension is severe . RVSP is >69 mmHg. Pulmonary hypertension is severe . Aortic Valve  The aortic valve is trileaflet. Decreased aortic valve leaflet excursion. The aortic valve appears moderately sclerotic. Mild aortic stenosis is present. Pulmonic Valve  Pulmonic valve is structurally normal.  Mild pulmonic regurgitation present. Pericardial Effusion  No evidence of pericardial thickening/calcification present. No evidence of pericardial effusion. Aorta  Aortic root dimension within normal limits. Ascending aorta appears mildly sclerotic and/or calcified. Miscellaneous  Dilated Inferior Vena Cava. Inferior Vena Cava, no respiratory variation. Conclusions   Summary  Left ventricle grossly normal in size. Early paradoxical septal motion. Normal left ventricular wall thickness. Doppler/Tissue doppler not obtained. Estimated left ventricular ejection fraction is 45±5%. The LAESV Index is >34 ml/m2. Moderately dilated right ventricle. TAPSE is decreased  Right ventricle global systolic function is moderately reduced . Physiologic and/or trace mitral regurgitation is present. The aortic valve appears moderately sclerotic. Mild aortic stenosis is present.   Moderate to severe tricuspid regurgitation. Pulmonary hypertension is severe . RVSP is >69 mmHg. Pulmonary hypertension is severe . Mild pulmonic regurgitation present. Technically good quality study. Technically difficult study due to patient''s body habitus. Compared to prior echo, changes noted. Suggest clinical correlation.    Signature   ----------------------------------------------------------------  Electronically signed by Yvonne GAUTAMInterpreting  physician) on 06/17/2021 03:35 PM  ----------------------------------------------------------------  M-Mode/2D Measurements & Calculations   LV Diastolic    LV Systolic Dimension: 3.5   AV Cusp Separation: 1.2 cmLA  Dimension: 4.6  cm                           Dimension: 5.8 cmAO Root  cm              LV Volume Diastolic: 91.0 ml Dimension: 3.1 cm  LV FS:23.9 %    LV Volume Systolic: 57.5 ml  LV PW           LV EDV/LV EDV Index: 84.7  Diastolic: 1.1  KJ/55 GP/I^5BT ESV/LV ESV  cm              Index: 51.2 ml/23ml/ m^2     RV Diastolic Dimension: 4.7  LV PW Systolic: EF Calculated: 36.9 %        cm  1.2 cm          LV Mass Index: 81 l/min*m^2  Septum                                       LA/Aorta: 7.30  Diastolic: 1.1                               Ascending Aorta: 2.9 cm  cm              LVOT: 2 cm                   LA volume/Index: 114 ml  Septum                                       /12QV/X^7  Systolic: 1.2                                RA Area: 28 cm^2  cm  CO: 5.2 l/min                                IVC Expiration: 3 cm  CI: 2.32  l/m*m^2  LV Mass: 181.22  g  Doppler Measurements & Calculations   MV Peak E-Wave: 1.31 AV Peak Velocity:     LVOT Peak Velocity: 1.08 m/s  m/s                  2.09 m/s              LVOT Mean Velocity: 0.78 m/s                       AV Peak Gradient:     LVOT Peak Gradient: 4.6  MV Peak Gradient:    17.39 mmHg            mmHgLVOT Mean Gradient: 2.5  6.8 mmHg             AV Mean Velocity:     mmHg  MV Mean Gradient:    1.48 m/s calcifications which is a marker for coronary atherosclerotic disease. Organs: No obvious hepatic, splenic, pancreatic, adrenal or renal mass is seen. A lesion could be missed in the absence of IV contrast.  There is a stable approximately 2 cm hypodensity in the posterior aspect of the lower pole of the right kidney, consistent with a cyst.  There is no evidence of hydronephrosis or renal calculi. The gallbladder is surgically absent. The liver shows slightly nodular contour that can be associated with cirrhosis. GI/Bowel: There is no evidence of bowel obstruction. No evidence of abnormal bowel wall thickening or distension. There is sigmoid diverticulosis without evidence of diverticulitis. Pelvis: Moderate to large amount of free fluid is seen in the pelvis. No pelvic mass or lymphadenopathy is seen. Peritoneum/Retroperitoneum: There is moderate to large amount of ascites. No evidence of retroperitoneal lymphadenopathy. There is no evidence of intraperitoneal lymphadenopathy. Prominent calcified plaque is seen in the abdominal aorta and iliac vessels with no evidence of aneurysm. The bilateral iliac stents are noted. Bones/Soft Tissues:  Age related degenerative changes of the visualized osseous structures without focal destructive lesion. There is diffuse subcutaneous edema, consistent with anasarca. 1. No evidence of bowel obstruction. 2. Moderate to large amount of ascites. Findings consistent with anasarca. 3. Moderate left and small right pleural effusions with mild left basilar atelectasis. 4. Slightly nodular contour of the liver that can be associated with cirrhosis although this is equivocal. 5. Sigmoid diverticulosis. No evidence of diverticulitis.      XR CHEST PORTABLE    Result Date: 6/20/2021  EXAMINATION: ONE XRAY VIEW OF THE CHEST 6/20/2021 9:45 am COMPARISON: 05/29/2021 HISTORY: ORDERING SYSTEM PROVIDED HISTORY: exp wheezes TECHNOLOGIST PROVIDED HISTORY: Reason for exam:->exp wheezes FINDINGS: Stable cardiomediastinal silhouette. Prominent central vessels and prominent bilateral reticular markings. Patchy perihilar opacities. No pneumothorax. Possible small bilateral pleural effusions. No acute osseous abnormality. Findings may be related to mild congestive heart failure with interstitial pulmonary edema. XR CHEST PORTABLE    Result Date: 5/29/2021  EXAMINATION: ONE XRAY VIEW OF THE CHEST 5/29/2021 8:18 am COMPARISON: February 12, 2019 HISTORY: ORDERING SYSTEM PROVIDED HISTORY: Shortness of breath TECHNOLOGIST PROVIDED HISTORY: Reason for exam:->Shortness of breath FINDINGS: The heart has upper borderline size. There is some relative prominence of the central perihilar vessels. No acute infiltrates or consolidations are seen. There is minimal blunting of the left-sided costophrenic sulcus which could represent minimal left-sided pleural effusion. Calcified granuloma likely seen in the right base. Borderline size heart. Slightly prominent perihilar vessels. The cannot exclude minimal left-sided pleural effusion. US ABDOMEN LIMITED    Result Date: 6/18/2021  EXAMINATION: LIMITED ABDOMINAL ULTRASOUND 6/18/2021 12:09 pm COMPARISON: None. HISTORY: ORDERING SYSTEM PROVIDED HISTORY: ascites TECHNOLOGIST PROVIDED HISTORY: Reason for exam:->ascites What reading provider will be dictating this exam?->CRC FINDINGS: Ascites survey of the 4 quadrants of the abdomen was performed. There is a moderate amount of ascites throughout the abdomen most pronounced in the left lower quadrant. Incidental note is made of a small right pleural effusion. Moderate abdominal ascites throughout the abdomen.      US RETROPERITONEAL COMPLETE    Result Date: 6/15/2021  EXAMINATION: RETROPERITONEAL ULTRASOUND OF THE KIDNEYS AND URINARY BLADDER 6/15/2021 COMPARISON: AP CT 05/29/2021 HISTORY: ORDERING SYSTEM PROVIDED HISTORY: rigo TECHNOLOGIST PROVIDED HISTORY: Ascites Reason for exam:->rigo What reading provider will be dictating this exam?->CRC FINDINGS: RENAL MEASUREMENTS: Length of visualized right kidney: 10.8 cm. Right renal cortical thickness: 17 mm. Length of visualized left kidney: 11.3 cm. Left renal cortical thickness: 18 mm. RIGHT KIDNEY: Focal parenchymal or cortical lesion: A non-specific, smoothly marginated, hypoechoic lesion is statistically most likely a cyst.  There is no evidence of mural nodularity, septation, wall thickening, or calcification. It measures 18 mm and is again noted in the right renal lower pole. Possible calculus: None. Hydronephrosis: None. Perinephric fluid: None. LEFT KIDNEY: Upper pole not well visualized sonographically. Focal parenchymal or cortical lesion: None. Possible calculus: None. Hydronephrosis: None. Perinephric fluid: None. URINARY BLADDER: Focal abnormality: None. Wall thickening: None. Prevoid volume: 196cc. Slight to moderate right upper quadrant and pelvic ascites. No sonographic evidence of acute renal pathology, mass, or calculus. Abdomen and pelvic ascites     US DUP LOWER EXTREMITIES BILATERAL VENOUS    Result Date: 6/18/2021  EXAMINATION: DUPLEX VENOUS ULTRASOUND OF THE BILATERAL LOWER EXTREMITIES, 6/18/2021 7:45 pm TECHNIQUE: Duplex ultrasound using B-mode/gray scaled imaging and Doppler spectral analysis and color flow was obtained of the bilateral lower extremities. COMPARISON: None. HISTORY: ORDERING SYSTEM PROVIDED HISTORY: dvt TECHNOLOGIST PROVIDED HISTORY: Reason for exam:->dvt What reading provider will be dictating this exam?->CRC FINDINGS: Due to anasarca evaluation is somewhat limited. The visualized veins of the bilateral lower extremities are patent and free of echogenic thrombus. The veins demonstrate good compressibility with normal color flow study and spectral analysis. No evidence of DVT in either lower extremity.        EKG: See Report  Echo: See Report      IMPRESSIONS:  Principal Problem:    KEANU (acute kidney injury) ([facility] [address]  Active Problems:    Ascites    HTN (hypertension), benign    CKD (chronic kidney disease) stage 3, GFR 30-59 ml/min (HCC)    Atrial fibrillation, chronic (HCC)    Obesity (BMI 30-39. 9)    Hyperlipidemia LDL goal <100    Bilateral carotid artery stenosis    Hyperkalemia  Resolved Problems:    * No resolved hospital problems. *      RECOMMENDATIONS:  ***    I have spent more than *** minutes face to face with Deerfield Bimler and reviewing notes and laboratory data, with greater than 50% of this time instructing and counseling the patient *** face to face regarding my findings and recommendations and I have answered all questions as posed to me by Mr. Campos Puente, DO FACP,FACC,FSCAI      NOTE:  This report was transcribed using voice recognition software.   Every effort was made to ensure accuracy; however, inadvertent computerized transcription errors may be present

## 2021-06-21 NOTE — PROGRESS NOTES
Physical Therapy    Facility/Department: [address] INTERNAL MEDICINE 2  Treatment note    NAME: Rj Childress  : 1940  MRN: [id number]    Date of Service: 2021      Patient Diagnosis(es): The encounter diagnosis was Other ascites. has a past medical history of Ascites, Atrial fibrillation (HCC), Bilateral carotid artery stenosis, CKD (chronic kidney disease) stage 3, GFR 30-59 ml/min (HCC), Hyperlipidemia, Hypertension, and Obesity (BMI 30-39.9). has a past surgical history that includes Carotid endarterectomy; shoulder surgery (Bilateral); Finger trigger release (Bilateral); knee surgery (Right); Cholecystectomy; Carpal tunnel release (Bilateral); Upper gastrointestinal endoscopy (N/A, 2019); Colonoscopy; and Paracentesis (N/A, 6/15/2021). Evaluating Therapist: Lima Escamilla PT      Room #:  5002/4997-N  Diagnosis:  KEANU (acute kidney injury) ([facility] [address] [N17.9]  PMHx/PSHx:  A fib, CKD  Precautions:  Falls, alarm      Social:  Pt lives with granddaughter in a first floor setup. Independent without device. Initial Evaluation  Date: 21 Treatment  21    Short Term/ Long Term   Goals   Was pt agreeable to Eval/treatment? yes yes    Does pt have pain?  No c/o pain No complaints    Bed Mobility  Rolling: min assist  Supine to sit: min assist  Sit to supine: NT  Scooting: min assist Rolling: SBA  Supine to sit: SBA  Scooting: Min A seated to EOB independent   Transfers Sit to stand: min assist  Stand to sit: min assist  Stand pivot: min assist Sit <>stand SBA supervision   Ambulation    10 feet and 30 feet with no device, but pt holding IV pole with CGA 30 feet x 1 with B UE hand held assist Min A for balance, 170 feet x 1 using WW for support  feet with AAD with supervision   Stair Negotiation  Ascended and descended  NT NT  4 steps with 1 rail with SBA   LE strength     3+/5    4-/5   balance      fair     AM-PAC Raw score                         Pt is alert and able to follow instruction  Balance: fair dynamic using [address] for support, poor dynamic with B UE hand held assist    Pt performed therapeutic exercise of the following: NT    Patient education  Pt was educated on UE usage to assist with transfers, gait promoting posture and staying within [address] base of support    Patient response to education:   Pt verbalized understanding Pt demonstrated skill Pt requires further education in this area   yes With instruction yes     ASSESSMENT:   Comments: Nurse ok with rx. Pt found in bed, agreed to rx. Pt assisted to EOB, sat EOB SBA. Pt states prefers not to use a [address] for support. Gait performed initially in the room, Pt with unsteadiness, automatically reached for B UE hands held to assist with balance. Pt then given explanation about benefits of using WW. Gait to the hallway, [place] slow and consistent, no loss of balance noted, Pt short of breath after activity. 02 saturation 97% after activity on room air. Treatment: Pt practiced and was instructed in the following treatment: transfer safety, gait/WW mechanics    Pt was left in a bedside chair with call light in reach    Chair/bed alarm: chair alarm active    Time in 1303   Time out 1327   Total Treatment Time 24 minutes   CPT codes:     Therapeutic activities 56009 24 minutes   Therapeutic exercises 67764 0 minutes       Pt is making good progress toward established Physical Therapy goals. Continue with physical therapy current plan of care. Recommend use of [address] for home.     Lex Bautista PTA   License Number: [id number]

## 2021-06-21 NOTE — PROGRESS NOTES
Internal Medicine Progress Note    Patient's name: Ana Maria Quintanilla  : 1940  Chief complaints (on day of admission): Hyperkalemia  Admission date: 6/15/2021  Date of service: 2021   Room: [address] INTERNAL MEDICINE  Primary care physician: Lizette Suresh DO  Reason for visit: Follow-up for KEANU/hyperkalemia     Subjective  Jennifer Burgess was seen and examined. He is resting comfortably in his room. He was sleeping. He was easily awoke. He has a flat affect he did not want to talk much today. I brought up about transferring him to a tertiary care center for a right heart catheterization. He did not seem to be too interested in this. He wanted me to talk to his granddaughter. The patient's granddaughter was called at [phone]. There was no answer. I left a voicemail asking for call back. Review of Systems  There are no new complaints of chest pain, shortness of breath, abdominal pain, nausea, vomiting, diarrhea, constipation.     Hospital Medications  Current Facility-Administered Medications   Medication Dose Route Frequency Provider Last Rate Last Admin    hydrALAZINE (APRESOLINE) tablet 75 mg  75 mg Oral TID Abhi Hodges DO        ipratropium-albuterol (DUONEB) nebulizer solution 1 ampule  1 ampule Inhalation Q4H WA Archie Ulloa MD   1 ampule at 21 0841    bumetanide (BUMEX) injection 2 mg  2 mg Intravenous BID Evin Raza MD   2 mg at 21 2147    amLODIPine (NORVASC) tablet 5 mg  5 mg Oral Daily Julia Thakkar MD   5 mg at 21 1032    acetaminophen (TYLENOL) tablet 650 mg  650 mg Oral Q6H PRN Abhi Hodges DO        sodium zirconium cyclosilicate (LOKELMA) oral suspension 5 g  5 g Oral TID Lisa Preciado MD   5 g at 21 2148    ferric gluconate (FERRLECIT) 100 mg in sodium chloride 0.9 % 100 mL IVPB  100 mg Intravenous Once Lisa Preciado MD        sodium chloride flush 0.9 % injection 10 mL  10 mL Intravenous 2 times per day Ana Small DO   10 mL at 06/20/21 2148    sodium chloride flush 0.9 % injection 10 mL  10 mL Intravenous PRN Abhi Hodges, DO        0.9 % sodium chloride infusion  25 mL Intravenous PRN Abhi Hodges, DO        ondansetron (ZOFRAN-ODT) disintegrating tablet 4 mg  4 mg Oral Q8H PRN Abhi Hodges, DO        Or    ondansetron (ZOFRAN) injection 4 mg  4 mg Intravenous Q6H PRN Abhi Hodges, DO        senna (SENOKOT) tablet 8.6 mg  1 tablet Oral Daily PRN Abhi Hodges, DO        allopurinol (ZYLOPRIM) tablet 300 mg  300 mg Oral Daily Abhi Hodges, DO   300 mg at 06/20/21 1032    levothyroxine (SYNTHROID) tablet 25 mcg  25 mcg Oral Daily Abhi Hodges, DO   25 mcg at 06/20/21 0610    hydrOXYzine (ATARAX) tablet 10 mg  10 mg Oral Nightly Abhi Hodges, DO   10 mg at 06/20/21 2148    metoprolol succinate (TOPROL XL) extended release tablet 50 mg  50 mg Oral Daily Abhi Hodges, DO   50 mg at 06/20/21 1031    pantoprazole (PROTONIX) tablet 40 mg  40 mg Oral Daily Abhi Hodges, DO   40 mg at 06/20/21 1031    primidone (MYSOLINE) tablet 100 mg  100 mg Oral BID Abhi Hodges, DO   100 mg at 06/20/21 2147    sodium bicarbonate tablet 650 mg  650 mg Oral BID Abhi Hodges, DO   650 mg at 06/20/21 2147    tamsulosin (FLOMAX) capsule 0.4 mg  0.4 mg Oral Daily Abhi Hodges, DO   0.4 mg at 06/20/21 1031    vitamin D (CHOLECALCIFEROL) tablet 2,000 Units  2,000 Units Oral Daily Abhi Hodges, DO   2,000 Units at 06/20/21 1032    perflutren lipid microspheres (DEFINITY) injection 1.65 mg  1.5 mL Intravenous ONCE PRN Abhi Hodges, DO        dextrose 50 % IV solution  25 g Intravenous PRN Mercy Jean Baptiste MD           PRN Medications  acetaminophen, sodium chloride flush, sodium chloride, ondansetron **OR** ondansetron, senna, perflutren lipid microspheres, dextrose    Objective  Most Recent Recorded Vitals  BP (!) 159/70   Pulse 72   Temp 98.3 °F (36.8 °C)   Resp 16   Ht 5' 9\" (1.753 m)   Wt 248 lb 6.4 oz (112.7 kg)   SpO2 95%   BMI 36.68 kg/m²   I/O last 3 completed shifts: In: 0   Out: 1100 [Urine:1100]  No intake/output data recorded. Physical Exam:   General: AAO to person/place/time/purpose, NAD, no labored breathing, flat affect again today  Eyes: conjunctivae/corneas clear, sclera non icteric  Skin: color/texture/turgor normal, no rashes or lesions  Lungs: Diminished but CTAB, no retractions/use of accessory muscles, no vocal fremitus, no rhonchi, no crackle, no rales  Heart: regular rate, regular rhythm, systolic murmur  Abdomen: distended with fluid wave soft again today, NT, bowel sounds normal  Extremities: atraumatic, LE edema slightly worse than yesterday  Neurologic: cranial nerves 2-12 grossly intact, no slurred speech    Most Recent Labs  Lab Results   Component Value Date    WBC 4.8 06/21/2021    HGB 8.7 (L) 06/21/2021    HCT 26.8 (L) 06/21/2021     06/21/2021     06/21/2021    K 4.4 06/21/2021     (H) 06/21/2021    CREATININE 3.1 (H) 06/21/2021    BUN 85 (H) 06/21/2021    CO2 21 (L) 06/21/2021    GLUCOSE 68 (L) 06/21/2021    ALT 11 06/21/2021    AST 27 06/21/2021    INR 1.3 06/21/2021    TSH 0.728 12/17/2014       XR CHEST PORTABLE   Final Result   Findings may be related to mild congestive heart failure with interstitial   pulmonary edema. US DUP LOWER EXTREMITIES BILATERAL VENOUS   Final Result   No evidence of DVT in either lower extremity. US ABDOMEN LIMITED   Final Result   Moderate abdominal ascites throughout the abdomen. US RETROPERITONEAL COMPLETE   Final Result   No sonographic evidence of acute renal pathology, mass, or calculus.       Abdomen and pelvic ascites         IR Interventional Radiology Procedure Request    (Results Pending)     CT ABDOMEN PELVIS WO CONTRAST Additional Contrast? None    Result Date: 5/29/2021  EXAMINATION: CT OF THE ABDOMEN AND PELVIS WITHOUT CONTRAST 5/29/2021 8:18 am TECHNIQUE: CT of the abdomen and pelvis was abdominal aorta and iliac vessels with no evidence of aneurysm. The bilateral iliac stents are noted. Bones/Soft Tissues:  Age related degenerative changes of the visualized osseous structures without focal destructive lesion. There is diffuse subcutaneous edema, consistent with anasarca. 1. No evidence of bowel obstruction. 2. Moderate to large amount of ascites. Findings consistent with anasarca. 3. Moderate left and small right pleural effusions with mild left basilar atelectasis. 4. Slightly nodular contour of the liver that can be associated with cirrhosis although this is equivocal. 5. Sigmoid diverticulosis. No evidence of diverticulitis. XR CHEST PORTABLE    Result Date: 5/29/2021  EXAMINATION: ONE XRAY VIEW OF THE CHEST 5/29/2021 8:18 am COMPARISON: February 12, 2019 HISTORY: ORDERING SYSTEM PROVIDED HISTORY: Shortness of breath TECHNOLOGIST PROVIDED HISTORY: Reason for exam:->Shortness of breath FINDINGS: The heart has upper borderline size. There is some relative prominence of the central perihilar vessels. No acute infiltrates or consolidations are seen. There is minimal blunting of the left-sided costophrenic sulcus which could represent minimal left-sided pleural effusion. Calcified granuloma likely seen in the right base. Borderline size heart. Slightly prominent perihilar vessels. The cannot exclude minimal left-sided pleural effusion. Echo 6/17/21   Left ventricle grossly normal in size. Early paradoxical septal motion. Normal left ventricular wall thickness. Doppler/Tissue doppler not obtained. Estimated left ventricular ejection fraction is 45±5%. The LAESV Index is >34 ml/m2. Moderately dilated right ventricle. TAPSE is decreased   Right ventricle global systolic function is moderately reduced . Physiologic and/or trace mitral regurgitation is present. The aortic valve appears moderately sclerotic. Mild aortic stenosis is present.    Moderate to severe tricuspid regurgitation. Pulmonary hypertension is severe . RVSP is >69 mmHg. Pulmonary hypertension is severe . Mild pulmonic regurgitation present. Technically good quality study. Technically difficult study due to patient''s body habitus. Compared to prior echo, changes noted. Suggest clinical correlation. Assessment   Active Hospital Problems    Diagnosis     KEANU (acute kidney injury) ([facility] [address] [N17.9]      Priority: High    Ascites [R18.8]      Priority: Medium    Hyperkalemia [E87.5]      Priority: Medium    CKD (chronic kidney disease) stage 3, GFR 30-59 ml/min (HCC) [N18.30]      Priority: Medium    Bilateral carotid artery stenosis [I65.23]     Obesity (BMI 30-39. 9) [E66.9]     Hyperlipidemia LDL goal <100 [E78.5]     Atrial fibrillation, chronic (HCC) [I48.20]     HTN (hypertension), benign [I10]          Plan  · KEANU on CKD 3 with hyperkalemia: Baseline serum creatinine somewhere between 1.4 and 2.4. Follow BMP. Defer diuresis/HD to nephrology- IVF stopped. Hold lisinopril. Continue allopurinol/sodium bicarb. [place] Gaucher. 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 GI following for interpretation of ascitic fluid studies and cirrhosis w/u. Hepatitis panel negative. Noted echo and cardio consult still pending. Pulmonology consultation. For IR therapeutic paracentesis 6/21 (Eliquis on hold). · History of rectal cancer: General surgery following. · Macrocytic anemia: Noted Fe/Ferritin/TIBC/vitamin B12/folate. Follow H&H. Pending stool for occult blood. · PT AM-PAC-- 17/24  · Follow labs  · DVT prophylaxis. · Please see orders for further management and care. ·  for discharge planning  · Discharge plan: TBD pending clinical improvement     Electronically signed by Owen Carvalho DO on 6/21/2021 at 9:57 AM    I can be reached through [employer]. Addendum:    Long discussion over the phone.   For greater than 20 minutes we discussed goals of care. She wants to take him home as soon as possible because she knows that he does not want to stay in the hospital.  I gave the option of transferring him to a tertiary care center for right heart catheterization and she does not want to do this right now. I also gave her the option of consulting hospice, but she does not want to do this either. She would like to get the paracentesis and discharge him home later today or tomorrow. I did warn her that I do not think that this is medically best for the patient as he would likely be readmitted very shortly. She told me that she did not think that this would be the case.     Electronically signed by Dalia Hoover DO on 6/21/2021 at 10:07 AM

## 2021-06-21 NOTE — PROGRESS NOTES
Pulmonary Progress Note    Admit Date: 6/15/2021  Hospital day                               PCP: Brady Herrera DO    Chief Complaint (s):  Patient Active Problem List   Diagnosis    HTN (hypertension), benign    CKD (chronic kidney disease) stage 3, GFR 30-59 ml/min (HCC)    Atrial fibrillation, chronic (HCC)    Obesity (BMI 30-39. 9)    Hyperlipidemia LDL goal <100    Bilateral carotid artery stenosis    Hyperkalemia    KEANU (acute kidney injury) ([address]    Ascites       Subjective:  · Events of the weekend reviewed. Patient recently completed a paracentesis this p.m. Vitals:  VITALS:  BP (!) 140/65   Pulse 78   Temp 97.6 °F (36.4 °C) (Oral)   Resp 20   Ht 5' 9\" (1.753 m)   Wt 248 lb 6.4 oz (112.7 kg)   SpO2 96%   BMI 36.68 kg/m²     24HR INTAKE/OUTPUT:      Intake/Output Summary (Last 24 hours) at 2021 1622  Last data filed at 2021 1228  Gross per 24 hour   Intake 0 ml   Output 1300 ml   Net -1300 ml       24HR PULSE OXIMETRY RANGE:    SpO2  Av.8 %  Min: 95 %  Max: 96 %    Medications:  IV:   sodium chloride         Scheduled Meds:   hydrALAZINE  75 mg Oral TID    ipratropium-albuterol  1 ampule Inhalation Q4H WA    bumetanide  2 mg Intravenous BID    amLODIPine  5 mg Oral Daily    sodium zirconium cyclosilicate  5 g Oral TID    ferric gluconate (FERRLECIT) IVPB  100 mg Intravenous Once    sodium chloride flush  10 mL Intravenous 2 times per day    allopurinol  300 mg Oral Daily    levothyroxine  25 mcg Oral Daily    hydrOXYzine  10 mg Oral Nightly    metoprolol succinate  50 mg Oral Daily    pantoprazole  40 mg Oral Daily    primidone  100 mg Oral BID    sodium bicarbonate  650 mg Oral BID    tamsulosin  0.4 mg Oral Daily    vitamin D  2,000 Units Oral Daily       Diet:   ADULT DIET; Regular; No Added Salt (3-4 gm)  Adult Oral Nutrition Supplement; Protein Modular     EXAM:  General: No distress. Alert. Eyes: PERRL. No sclera icterus.  No conjunctival injection. ENT: No discharge. Pharynx clear. Neck: Trachea midline. Normal thyroid. Resp: No accessory muscle use. No rales. No wheezing. No rhonchi. CV: Regular rate. Regular rhythm. No murmur or rub. Abd: Non-tender. Non-distended. No masses. No organomegaly. Normal bowel sounds. Skin: Warm and dry. No nodule on exposed extremities. No rash on exposed extremities. Ext: No cyanosis, clubbing, edema  Lymph: No cervical LAD. No supraclavicular LAD. M/S: No cyanosis. No joint deformity. No clubbing. Neuro: Awake. Follows commands. Positive pupils/gag/corneals. Normal pain response. Results:  CBC:   Recent Labs     06/19/21  0700 06/20/21  0720 06/21/21  0520   WBC 5.9 5.5 4.8   HGB 9.0* 8.8* 8.7*   HCT 27.0* 27.8* 26.8*   .1* 106.9* 107.6*    151 150     BMP:   Recent Labs     06/19/21  0700 06/20/21  0720 06/21/21  0520    141 141   K 4.6 4.4 4.4   * 110* 109*   CO2 21* 21* 21*   BUN 86* 87* 85*   CREATININE 3.3* 3.1* 3.1*     LIVER PROFILE:   Recent Labs     06/19/21  0700 06/20/21  0720 06/21/21  0520   AST 30 30 27   ALT 11 12 11   BILITOT 0.6 0.5 0.6   ALKPHOS 247* 249* 242*     PT/INR:   Recent Labs     06/21/21  0748   PROTIME 13.6*   INR 1.3     APTT: No results for input(s): APTT in the last 72 hours. Pathology:  1. N/A      Microbiology:  1. None    Recent ABG:   No results for input(s): PH, PO2, PCO2, HCO3, BE, O2SAT, METHB, O2HB, COHB, O2CON, HHB, THB in the last 72 hours. Recent Films:  US GUIDED PARACENTESIS   Final Result   Successful ultrasound guided paracentesis. XR CHEST PORTABLE   Final Result   Findings may be related to mild congestive heart failure with interstitial   pulmonary edema. US DUP LOWER EXTREMITIES BILATERAL VENOUS   Final Result   No evidence of DVT in either lower extremity. US ABDOMEN LIMITED   Final Result   Moderate abdominal ascites throughout the abdomen.          US RETROPERITONEAL COMPLETE   Final Result   No sonographic evidence of acute renal pathology, mass, or calculus. Abdomen and pelvic ascites           Assessment:  1. Pulmonary hypertension with right heart failure. This is resulted in significant peripheral edema and ascites. It is unlikely that the pulmonary hypertension is from significant nocturnal hypoxia or thromboembolic disease. Nonetheless, because of the asymmetric swelling of lower extremities, DVT is excluded. Valvular heart disease may be underlying. Type I pulmonary hypertension is thought to be less likely. 2. CKD 4 per nephrology  3. Valvular heart disease with well preserved ejection fraction  4. No evidence of DVT, CTEPH is not suspected.        Plan:  1. CKD per nephrology  2. Consideration for tertiary referral for right heart catheterization.       Time at the bedside, reviewing labs and radiographs, reviewing updated notes and consultations, discussing with staff and family was more than 35 minutes. Please note that voice recognition technology was used in the preparation of this note and make therefore it may contain inadvertent transcription errors. If the patient is a COVID 19 isolation patient, the above physical exam reflects that of the examining physician for the day. Mil Kingsley MD,  M.ALISON., F.C.C.P.     [facility], [address] [place] [address], [address] [place] [address]

## 2021-06-21 NOTE — PROGRESS NOTES
[facility] Roberto A. Gaston Finger, MD Micky Finn, MD Matias Manas, MD Ulysses Pacer, MD Lilia Bowler, CNP   Anna Meadows, NATALIE  Progress Note    6/21/2021    SUBJECTIVE:   6/16: Feeling better. With edema better now that he had his lymphedema wraps in place. No dyspnea at rest on room air. Ongoing fatigue and generalized weakness. Continued abdominal distention, no pain. No cp/palp Appetite ok  6/17: \"Very tired\" did not sleep very well last night. No dyspnea at rest supine on room air. Substantial lower extremity edema persists lymphedema distally somewhat better, though abdominal girth worse than yesterday. 6/18 : seen today on RA , weak , LE swelling noted . 6/19: Patient was seen and examined today case was discussed thoroughly with the nurse, patient still has severe swelling in his lower extremities with ascites noted probably old right heart failure this patient that has also bilateral ventricular failures, will start diuretics today with Bumex and follow clinically course  6/20: No major change in clinical status with labs almost the same vital signs stable afebrile BUN today is 87 and creatinine is down to 3.1.  6/21 : appear comfortable . Stable cr . Per Pulm consider tertiary center referral for R heart cath     PROBLEM LIST:    Principal Problem:    KEANU (acute kidney injury) ([facility] [address]  Active Problems:    Ascites    HTN (hypertension), benign    CKD (chronic kidney disease) stage 3, GFR 30-59 ml/min (HCC)    Atrial fibrillation, chronic (HCC)    Obesity (BMI 30-39. 9)    Hyperlipidemia LDL goal <100    Bilateral carotid artery stenosis    Hyperkalemia  Resolved Problems:    * No resolved hospital problems. *         DIET:    ADULT DIET; Regular;  No Added Salt (3-4 gm)  Adult Oral Nutrition Supplement; Protein Modular     MEDS (scheduled):    hydrALAZINE  75 mg Oral TID    ipratropium-albuterol  1 ampule Inhalation Q4H WA    bumetanide  2 mg Intravenous BID    amLODIPine  5 mg Oral Daily    sodium zirconium cyclosilicate  5 g Oral TID    ferric gluconate (FERRLECIT) IVPB  100 mg Intravenous Once    sodium chloride flush  10 mL Intravenous 2 times per day    allopurinol  300 mg Oral Daily    levothyroxine  25 mcg Oral Daily    hydrOXYzine  10 mg Oral Nightly    metoprolol succinate  50 mg Oral Daily    pantoprazole  40 mg Oral Daily    primidone  100 mg Oral BID    sodium bicarbonate  650 mg Oral BID    tamsulosin  0.4 mg Oral Daily    vitamin D  2,000 Units Oral Daily       MEDS (infusions):   sodium chloride         MEDS (prn):  acetaminophen, sodium chloride flush, sodium chloride, ondansetron **OR** ondansetron, senna, perflutren lipid microspheres, dextrose    PHYSICAL EXAM:     Patient Vitals for the past 24 hrs:   BP Temp Temp src Pulse Resp SpO2 Weight   06/21/21 1605 -- -- -- -- 20 96 % --   06/21/21 1426 (!) 140/65 97.6 °F (36.4 °C) Oral 78 -- 96 % --   06/21/21 1157 (!) 189/81 -- -- 75 18 96 % --   06/21/21 1031 (!) 178/78 -- -- 79 18 96 % --   06/21/21 0830 (!) 159/70 98.3 °F (36.8 °C) -- 72 16 95 % --   06/21/21 0520 -- -- -- -- -- -- 248 lb 6.4 oz (112.7 kg)   06/21/21 0057 (!) 142/68 98.4 °F (36.9 °C) Axillary 76 16 96 % --   @      Intake/Output Summary (Last 24 hours) at 6/21/2021 1658  Last data filed at 6/21/2021 1228  Gross per 24 hour   Intake 0 ml   Output 1300 ml   Net -1300 ml         Wt Readings from Last 3 Encounters:   06/21/21 248 lb 6.4 oz (112.7 kg)   05/29/21 220 lb (99.8 kg)   03/03/21 225 lb (102.1 kg)       Constitutional:  in no acute distress  HEENT: NC/AT, EOMI, sclera and conjunctiva are clear and anicteric, mucus membranes moist  Neck: Trachea midline, no JVD  Cardiovascular: S1, S2 regular rhythm, no murmur,or rub  Respiratory:  No crackles, no wheeze  Gastrointestinal:  Soft, nontender, nondistended, NABS  Ext: no edema, feet warm  Skin: dry, no rash  Neuro: awake, alert, interactive      DATA:    Recent Labs

## 2021-06-21 NOTE — PROGRESS NOTES
independence with ADLs     Recommended Adaptive Equipment: Reacher, sock aid and extended tub bench     Home Living: Pt lives with granddaughter, who works during the day, in a 1st floor set up. Bathroom setup: walk in tub with seat, 3:1 over commode      Prior Level of Function: Independent with ADLs except granddaughter assist in socks and MITCH hose, Mod I with simple IADLs, granddaughter assists as needed; completed functional mobility with no AD. Has [address].      Pain Level: no c/o pain     Cognition: A&O: 4/4                Functional Assessment: AM-PAC Daily Activity Raw Score: 18/24    Initial Eval Status  Date: 6/16/21 Treatment session: 6/21/21 STGs=LTGS  Timeframe 10-14 days      Feeding Independent       Grooming Set up   Independent   UB Dressing Set up   Independent   LB Dressing Max A  Donning B socks SBA  See comments  Min A    Bathing Mod A   SBA   Toileting Mod A   Mod I   Bed Mobility  Supine to sit: Min A  Increased time required Supine to sit: SBA  Scooting: Min A      Functional Transfers STS: Min A  STS: CGA from EOB Mod I   Functional Mobility CGA with no AD  Small steps to armchair  Pt declines further d/t extreme fatigue/weakness  SBA using ww household distance Mod I during ADLs   Balance Sitting: fair plus     Standing: fair minus HHA Sitting: Independent  Standing: SBA at ww     Activity Tolerance poor Fair-  standing sofía x6-7 min with fair plus balance during self care tasks                 Treatment: Upon arrival pt was supine in bed. Pt required cues for safety during transfers and AD management during mobility. Pt able to don pats without AD but required education on reacher/ sock aid to manage B socks due to decreased flexibility/ B LE swelling. Pt exhibited good return demonstration of AE. Issued reacher/ sock aid during session. OT recommends pt use extended tub bench to prevent falls during bathing due to weakness. Pt agreeable to DME and SW notified.   Encouraged pt to place his

## 2021-06-21 NOTE — CARE COORDINATION
6/21/2021  Social Work Discharge Planning:Plan is home with granddaughter. Referral was made to [facility] if granddaughter is agreeable. Room air. Electronically signed by PAUL Ashraf on 6/21/2021 at 11:29 AM      6/21/2021 Social Work Discharge Planning:Sw was notified that Pt will need an extended tub bench and ww. Referral was made to Sharon Quintana with  DME.  Electronically signed by PAUL Ashraf on 6/21/2021 at 1:35 PM

## 2021-06-21 NOTE — PROGRESS NOTES
IRFLOWSHEET    Date: 6/21/2021    Time: 10:31 AM     Exam: Ultrasound Guided Paracentesis    Radiologist Performing Procedure: Dr. Mary Mullins    Nurse Reporting/Phone:      Nurse Receiving: Marilia Maki RN    Permit signed:      Yes    ID Band Checklist: Yes    Allergies: Patient has no known allergies. TIME OUT: 1124    PROCEDURE START TIME: 1126    Puncture Site: RLQ Abdomen    Puncture Time: 5215    Catheters: 5 [place]    LABS:   Lab Results   Component Value Date    INR 1.3 06/21/2021    PROTIME 13.6 (H) 06/21/2021           Lab Results   Component Value Date    CREATININE 3.1 (H) 06/21/2021    BUN 85 (H) 06/21/2021          Lab Results   Component Value Date    HGB 8.7 (L) 06/21/2021    HCT 26.8 (L) 06/21/2021     06/21/2021         PROCEDURE END TIME: 8756    Complications:  None    Comments: Patient brought into room, discussed procedure. Dr. Mary Mullins answered all questions and concerns related to the procedure. Treatment consent signed. Patient positioned and sterile prepped for the procedure. 1% Lidocaine administered by Dr. Mary Mullins. A total of 3975 mL dark yellow ascitic fluid were taken. Patient tolerated procedure well. Site cleaned and dressed with a bandage. Vitals monitored and remained stable throughout. Report called to RN on floor, patient transported back to room.

## 2021-06-22 NOTE — PROGRESS NOTES
Pulmonary Progress Note    Admit Date: 6/15/2021  Hospital day                               PCP: Chuck Solano DO    Chief Complaint (s):  Patient Active Problem List   Diagnosis    HTN (hypertension), benign    CKD (chronic kidney disease) stage 3, GFR 30-59 ml/min (HCC)    Atrial fibrillation, chronic (HCC)    Obesity (BMI 30-39. 9)    Hyperlipidemia LDL goal <100    Bilateral carotid artery stenosis    Hyperkalemia    KEANU (acute kidney injury) ([address]    Ascites       Subjective:  · Resting comfortably this p.m. Vitals:  VITALS:  BP (!) 146/64   Pulse 80   Temp 98.4 °F (36.9 °C) (Oral)   Resp 16   Ht 5' 9\" (1.753 m)   Wt 230 lb 6.4 oz (104.5 kg)   SpO2 97%   BMI 34.02 kg/m²     24HR INTAKE/OUTPUT:      Intake/Output Summary (Last 24 hours) at 2021 1815  Last data filed at 2021 1547  Gross per 24 hour   Intake 0 ml   Output 350 ml   Net -350 ml       24HR PULSE OXIMETRY RANGE:    SpO2  Av.5 %  Min: 96 %  Max: 97 %    Medications:  IV:   sodium chloride         Scheduled Meds:   sildenafil  20 mg Oral TID    hydrALAZINE  50 mg Oral TID    chlorothiazide (DIURIL) IVPB  500 mg Intravenous Q12H    ipratropium-albuterol  1 ampule Inhalation Q4H WA    bumetanide  2 mg Intravenous BID    sodium zirconium cyclosilicate  5 g Oral TID    ferric gluconate (FERRLECIT) IVPB  100 mg Intravenous Once    sodium chloride flush  10 mL Intravenous 2 times per day    allopurinol  300 mg Oral Daily    levothyroxine  25 mcg Oral Daily    hydrOXYzine  10 mg Oral Nightly    metoprolol succinate  50 mg Oral Daily    pantoprazole  40 mg Oral Daily    primidone  100 mg Oral BID    sodium bicarbonate  650 mg Oral BID    tamsulosin  0.4 mg Oral Daily    vitamin D  2,000 Units Oral Daily       Diet:   ADULT DIET; Regular; No Added Salt (3-4 gm)  Adult Oral Nutrition Supplement; Fortified Pudding Oral Supplement     EXAM:  General: No distress. Alert. Eyes: PERRL.  No sclera icterus. No conjunctival injection. ENT: No discharge. Pharynx clear. Neck: Trachea midline. Normal thyroid. Resp: No accessory muscle use. No rales. No wheezing. No rhonchi. CV: Regular rate. Regular rhythm. No murmur or rub. Abd: Non-tender. Non-distended. No masses. No organomegaly. Normal bowel sounds. Skin: Warm and dry. No nodule on exposed extremities. No rash on exposed extremities. Ext: No cyanosis, clubbing, edema  Lymph: No cervical LAD. No supraclavicular LAD. M/S: No cyanosis. No joint deformity. No clubbing. Neuro: Awake. Follows commands. Positive pupils/gag/corneals. Normal pain response. Results:  CBC:   Recent Labs     06/20/21  0720 06/21/21  0520 06/22/21  0418   WBC 5.5 4.8 4.6   HGB 8.8* 8.7* 8.4*   HCT 27.8* 26.8* 26.6*   .9* 107.6* 104.7*    150 162     BMP:   Recent Labs     06/20/21  0720 06/21/21  0520 06/22/21  0418    141 142   K 4.4 4.4 3.8   * 109* 109*   CO2 21* 21* 21*   BUN 87* 85* 86*   CREATININE 3.1* 3.1* 2.9*     LIVER PROFILE:   Recent Labs     06/20/21  0720 06/21/21  0520 06/22/21  0418   AST 30 27 32   ALT 12 11 12   BILITOT 0.5 0.6 0.6   ALKPHOS 249* 242* 254*     PT/INR:   Recent Labs     06/21/21  0748   PROTIME 13.6*   INR 1.3     APTT: No results for input(s): APTT in the last 72 hours. Pathology:  1. N/A      Microbiology:  1. None    Recent ABG:   No results for input(s): PH, PO2, PCO2, HCO3, BE, O2SAT, METHB, O2HB, COHB, O2CON, HHB, THB in the last 72 hours. Recent Films:  US GUIDED PARACENTESIS   Final Result   Successful ultrasound guided paracentesis. XR CHEST PORTABLE   Final Result   Findings may be related to mild congestive heart failure with interstitial   pulmonary edema. US DUP LOWER EXTREMITIES BILATERAL VENOUS   Final Result   No evidence of DVT in either lower extremity. US ABDOMEN LIMITED   Final Result   Moderate abdominal ascites throughout the abdomen. US RETROPERITONEAL COMPLETE   Final Result   No sonographic evidence of acute renal pathology, mass, or calculus. Abdomen and pelvic ascites           Assessment:  1. Pulmonary hypertension with right heart failure. This is resulted in significant peripheral edema and ascites. It is unlikely that the pulmonary hypertension is from significant nocturnal hypoxia or thromboembolic disease. Nonetheless, because of the asymmetric swelling of lower extremities, DVT is excluded. Valvular heart disease may be underlying. Type I pulmonary hypertension is thought to be less likely. 2. CKD 4 per nephrology  3. Valvular heart disease with well preserved ejection fraction  4. No evidence of DVT, CTEPH is not suspected.        Plan:  1. CKD per nephrology  2. Consideration for tertiary referral for right heart catheterization. at this point, the patient is declining this.       Time at the bedside, reviewing labs and radiographs, reviewing updated notes and consultations, discussing with staff and family was more than 35 minutes. Please note that voice recognition technology was used in the preparation of this note and make therefore it may contain inadvertent transcription errors. If the patient is a COVID 19 isolation patient, the above physical exam reflects that of the examining physician for the day. Alberto Norwood MD,  M.ALISON., F.C.C.P.     [facility], [address] [place] [address], [address] [place] [address]

## 2021-06-22 NOTE — PROGRESS NOTES
Internal Medicine Progress Note    Patient's name: Rosalio Alejandro  : 1940  Chief complaints (on day of admission): Hyperkalemia  Admission date: 6/15/2021  Date of service: 2021   Room: [address] INTERNAL MEDICINE  Primary care physician: Tabatha Velasquez DO  Reason for visit: Follow-up for KEANU/hyperkalemia     Subjective  Sissy West was seen and examined. He states that he is rather depressed. He is tolerating diuretics. He had a bowel movement today and this was his first 1 in 3 to 4 days. He denies any new complaints or issues. He wants to go home. He really does not want to go to the [facility] or any tertiary care center. Review of Systems  There are no new complaints of chest pain, shortness of breath, abdominal pain, nausea, vomiting, diarrhea, constipation.     Hospital Medications  Current Facility-Administered Medications   Medication Dose Route Frequency Provider Last Rate Last Admin    hydrALAZINE (APRESOLINE) tablet 75 mg  75 mg Oral TID Abhi Hodges DO   75 mg at 21 0905    chlorothiazide (DIURIL) 500 mg in sodium chloride 0.9 % 50 mL IVPB  500 mg Intravenous Q12H Denys Hutchins MD   Stopped at 21 1035    ipratropium-albuterol (DUONEB) nebulizer solution 1 ampule  1 ampule Inhalation Q4H WA Patsy Loja MD   1 ampule at 21 0815    bumetanide (BUMEX) injection 2 mg  2 mg Intravenous BID Lurlean Galeazzi, MD   2 mg at 21 0905    amLODIPine (NORVASC) tablet 5 mg  5 mg Oral Daily Denys Hutchins MD   5 mg at 21 0906    acetaminophen (TYLENOL) tablet 650 mg  650 mg Oral Q6H PRN Abhi Hodges DO        sodium zirconium cyclosilicate (LOKELMA) oral suspension 5 g  5 g Oral TID Alissa Narvaez MD   5 g at 21 4054    ferric gluconate (FERRLECIT) 100 mg in sodium chloride 0.9 % 100 mL IVPB  100 mg Intravenous Once Alissa Narvaez MD        sodium chloride flush 0.9 % injection 10 mL  10 mL Intravenous 2 times per day Lodema Starch, DO   10 mL at 06/22/21 0907    sodium chloride flush 0.9 % injection 10 mL  10 mL Intravenous PRN Abhi Hodges, DO        0.9 % sodium chloride infusion  25 mL Intravenous PRN Abhi Hodges, DO        ondansetron (ZOFRAN-ODT) disintegrating tablet 4 mg  4 mg Oral Q8H PRN Abhi Hodges, DO        Or    ondansetron (ZOFRAN) injection 4 mg  4 mg Intravenous Q6H PRN Abhi Hodges, DO        senna (SENOKOT) tablet 8.6 mg  1 tablet Oral Daily PRN Abhi Hodges, DO        allopurinol (ZYLOPRIM) tablet 300 mg  300 mg Oral Daily Abhi Hodges, DO   300 mg at 06/22/21 0906    levothyroxine (SYNTHROID) tablet 25 mcg  25 mcg Oral Daily Abhi Hodges, DO   25 mcg at 06/22/21 0741    hydrOXYzine (ATARAX) tablet 10 mg  10 mg Oral Nightly Abhi Hodges, DO   10 mg at 06/21/21 2036    metoprolol succinate (TOPROL XL) extended release tablet 50 mg  50 mg Oral Daily Abhi Hodges, DO   50 mg at 06/22/21 0906    pantoprazole (PROTONIX) tablet 40 mg  40 mg Oral Daily Abhi Hodges, DO   40 mg at 06/22/21 0906    primidone (MYSOLINE) tablet 100 mg  100 mg Oral BID Abhi Hodges, DO   100 mg at 06/22/21 0095    sodium bicarbonate tablet 650 mg  650 mg Oral BID Abhi Hodges, DO   650 mg at 06/22/21 0906    tamsulosin (FLOMAX) capsule 0.4 mg  0.4 mg Oral Daily Abhi Hodges, DO   0.4 mg at 06/22/21 7378    vitamin D (CHOLECALCIFEROL) tablet 2,000 Units  2,000 Units Oral Daily Abhi Hodges, DO   2,000 Units at 06/22/21 0907    perflutren lipid microspheres (DEFINITY) injection 1.65 mg  1.5 mL Intravenous ONCE PRN Abhi Hodges, DO        dextrose 50 % IV solution  25 g Intravenous PRN Paige Del Valle MD           PRN Medications  acetaminophen, sodium chloride flush, sodium chloride, ondansetron **OR** ondansetron, senna, perflutren lipid microspheres, dextrose    Objective  Most Recent Recorded Vitals  BP (!) 172/83   Pulse 75   Temp 97.3 °F (36.3 °C) (Oral)   Resp 16   Ht 5' 9\" (1.753 m)   Wt 230 lb pelvis was performed without the administration of intravenous contrast. Multiplanar reformatted images are provided for review. Dose modulation, iterative reconstruction, and/or weight based adjustment of the mA/kV was utilized to reduce the radiation dose to as low as reasonably achievable. COMPARISON: CT abdomen and pelvis dated 12/19/2019 HISTORY: ORDERING SYSTEM PROVIDED HISTORY: Abdominal distention TECHNOLOGIST PROVIDED HISTORY: Reason for exam:->Abdominal distention Additional Contrast?->None Decision Support Exception - unselect if not a suspected or confirmed emergency medical condition->Emergency Medical Condition (MA) FINDINGS: Lower Chest: There are moderate left and small right pleural effusions with some compression atelectasis in the left lower lobe. No definite acute airspace disease is seen at either lung base. The heart is top-normal in size. There are mild coronary artery calcifications which is a marker for coronary atherosclerotic disease. Organs: No obvious hepatic, splenic, pancreatic, adrenal or renal mass is seen. A lesion could be missed in the absence of IV contrast.  There is a stable approximately 2 cm hypodensity in the posterior aspect of the lower pole of the right kidney, consistent with a cyst.  There is no evidence of hydronephrosis or renal calculi. The gallbladder is surgically absent. The liver shows slightly nodular contour that can be associated with cirrhosis. GI/Bowel: There is no evidence of bowel obstruction. No evidence of abnormal bowel wall thickening or distension. There is sigmoid diverticulosis without evidence of diverticulitis. Pelvis: Moderate to large amount of free fluid is seen in the pelvis. No pelvic mass or lymphadenopathy is seen. Peritoneum/Retroperitoneum: There is moderate to large amount of ascites. No evidence of retroperitoneal lymphadenopathy. There is no evidence of intraperitoneal lymphadenopathy.   Prominent calcified plaque is seen in the abdominal aorta and iliac vessels with no evidence of aneurysm. The bilateral iliac stents are noted. Bones/Soft Tissues:  Age related degenerative changes of the visualized osseous structures without focal destructive lesion. There is diffuse subcutaneous edema, consistent with anasarca. 1. No evidence of bowel obstruction. 2. Moderate to large amount of ascites. Findings consistent with anasarca. 3. Moderate left and small right pleural effusions with mild left basilar atelectasis. 4. Slightly nodular contour of the liver that can be associated with cirrhosis although this is equivocal. 5. Sigmoid diverticulosis. No evidence of diverticulitis. XR CHEST PORTABLE    Result Date: 5/29/2021  EXAMINATION: ONE XRAY VIEW OF THE CHEST 5/29/2021 8:18 am COMPARISON: February 12, 2019 HISTORY: ORDERING SYSTEM PROVIDED HISTORY: Shortness of breath TECHNOLOGIST PROVIDED HISTORY: Reason for exam:->Shortness of breath FINDINGS: The heart has upper borderline size. There is some relative prominence of the central perihilar vessels. No acute infiltrates or consolidations are seen. There is minimal blunting of the left-sided costophrenic sulcus which could represent minimal left-sided pleural effusion. Calcified granuloma likely seen in the right base. Borderline size heart. Slightly prominent perihilar vessels. The cannot exclude minimal left-sided pleural effusion. Echo 6/17/21   Left ventricle grossly normal in size. Early paradoxical septal motion. Normal left ventricular wall thickness. Doppler/Tissue doppler not obtained. Estimated left ventricular ejection fraction is 45±5%. The LAESV Index is >34 ml/m2. Moderately dilated right ventricle. TAPSE is decreased   Right ventricle global systolic function is moderately reduced . Physiologic and/or trace mitral regurgitation is present. The aortic valve appears moderately sclerotic. Mild aortic stenosis is present.    Moderate to diuresed and then to be able to go home.     Electronically signed by Trenton Ingram DO on 6/22/2021 at 11:15 AM

## 2021-06-22 NOTE — CARE COORDINATION
6/22/2021  Social Work Discharge Planning:Pt is from home with granddaughter and plan is to return there with [facility]. Order is in. Pt has a ww and tub bench. Room air.  Electronically signed by PAUL Gamino on 6/22/2021 at 10:00 AM

## 2021-06-22 NOTE — PROGRESS NOTES
[facility] MD Billy Cummings MD Dereck Gelinas, MD Melbourne Ranger, MD Lelon Reilly, CNP   Anna Meadows, NATALIE  Progress Note    6/22/2021    SUBJECTIVE:   6/16: Feeling better. With edema better now that he had his lymphedema wraps in place. No dyspnea at rest on room air. Ongoing fatigue and generalized weakness. Continued abdominal distention, no pain. No cp/palp Appetite ok  6/17: \"Very tired\" did not sleep very well last night. No dyspnea at rest supine on room air. Substantial lower extremity edema persists lymphedema distally somewhat better, though abdominal girth worse than yesterday. 6/18 : seen today on RA , weak , LE swelling noted . 6/19: Patient was seen and examined today case was discussed thoroughly with the nurse, patient still has severe swelling in his lower extremities with ascites noted probably old right heart failure this patient that has also bilateral ventricular failures, will start diuretics today with Bumex and follow clinically course  6/20: No major change in clinical status with labs almost the same vital signs stable afebrile BUN today is 87 and creatinine is down to 3.1.  6/21 : appear comfortable . Stable cr . Per Pulm consider tertiary center referral for R heart cath   6/22 : seen today on RA , lying flat . Continue to have considerable LE edema . His family reports hx of lymphedema . Family debating transfer to [facility] vs hospcie and how extensive they d like to be in treatment     PROBLEM LIST:    Principal Problem:    KEANU (acute kidney injury) ([facility]  Active Problems:    Ascites    HTN (hypertension), benign    CKD (chronic kidney disease) stage 3, GFR 30-59 ml/min (HCC)    Atrial fibrillation, chronic (HCC)    Obesity (BMI 30-39. 9)    Hyperlipidemia LDL goal <100    Bilateral carotid artery stenosis    Hyperkalemia  Resolved Problems:    * No resolved hospital problems. *         DIET:    ADULT DIET; Regular;  No Added JVD  Cardiovascular: S1, S2 regular rhythm, no murmur,or rub  Respiratory:  No crackles, no wheeze  Gastrointestinal:  Soft, nontender, nondistended, NABS  Ext: no edema, feet warm  Skin: dry, no rash  Neuro: awake, alert, interactive      DATA:    Recent Labs     06/20/21  0720 06/21/21  0520 06/22/21  0418   WBC 5.5 4.8 4.6   HGB 8.8* 8.7* 8.4*   HCT 27.8* 26.8* 26.6*   .9* 107.6* 104.7*    150 162     Recent Labs     06/20/21  0720 06/21/21  0520 06/22/21  0418    141 142   K 4.4 4.4 3.8   * 109* 109*   CO2 21* 21* 21*   BUN 87* 85* 86*   CREATININE 3.1* 3.1* 2.9*   ALT 12 11 12   AST 30 27 32   BILITOT 0.5 0.6 0.6   ALKPHOS 249* 242* 254*       Lab Results   Component Value Date    LABPROT 0.3 (H) 06/15/2021    LABPROT 0.3 06/15/2021    LABPROT 0.4 (H) 12/20/2019    LABPROT 0.4 12/20/2019       ASSESSMENT / RECOMMENDATIONS:    .  Acute kidney injury, likely hemodynamically mediated due to intravascular volume contraction associated with severe ascites and third spacing. Urinary indices are consistent with prerenal azotemia. Random urine protein creatinine ratio is 0.3 (consistent with roughly 300 mg/day of proteinuria, which is mild).     2. Chronic kidney disease, baseline serum creatinine has worsened in the past year, and likely is 2.1 to 2.4 mg/dL. Etiology is diabetic nephropathy, renal microvascular atherosclerotic disease     3. Hyperkalemia, secondary to acute kidney injury, decreased effective circulating volume     4. Anasarca with considerable ascites. With 0.3 g/day estimated proteinuria, not nephrotic syndrome. Liver on CT scan does not look cirrhotic. Consider severe diastolic dysfunction versus LV dysfunction     5. Ascites. Status post diagnostic paracentesis this morning. Results pending     6. Anemia at least in part if not in full secondary to chronic kidney disease, though need to consider occult GI bleed.   No melena or hematochezia.              continue lokelma   Place on bumex drip   Cut norvasac to 5 mg daily (might help his LE swelling )   No current need for dialysis .    Am labs      Electronically signed by Nemo Browne MD on 6/22/2021

## 2021-06-22 NOTE — PROGRESS NOTES
PROGRESS NOTE       PATIENT PROBLEM LIST:  Principal Problem:    KEANU (acute kidney injury) ([facility] [address]  Active Problems:    Ascites    HTN (hypertension), benign    CKD (chronic kidney disease) stage 3, GFR 30-59 ml/min (HCC)    Atrial fibrillation, chronic (HCC)    Obesity (BMI 30-39. 9)    Hyperlipidemia LDL goal <100    Bilateral carotid artery stenosis    Hyperkalemia  Resolved Problems:    * No resolved hospital problems. *      SUBJECTIVE:  [facility] states ***    REVIEW OF SYSTEMS:  General ROS: negative for - fatigue, malaise,  weight gain or weight loss  Psychological ROS: negative for - anxiety , depression  Ophthalmic ROS: negative for - decreased vision or visual distortion. ENT ROS: negative  Allergy and Immunology ROS: negative  Hematological and Lymphatic ROS: negative  Endocrine: no heat or cold intolerance and no polyphagia, polydipsia, or polyuria  Respiratory ROS: positive for - {rosresp symptoms:126888}  Cardiovascular ROS: positive for - {roscv symptoms:988850}. Gastrointestinal ROS: no abdominal pain, change in bowel habits, or black or bloody stools  Genito-Urinary ROS: no nocturia, dysuria, trouble voiding, frequency or hematuria  Musculoskeletal ROS: negative for- myalgias, arthralgias, or claudication  Neurological ROS: no TIA or stroke symptoms otherwise no significant change in symptoms or problems since yesterday as documented in previous progress notes.     SCHEDULED MEDICATIONS:   sildenafil  20 mg Oral TID    hydrALAZINE  50 mg Oral TID    chlorothiazide (DIURIL) IVPB  500 mg Intravenous Q12H    ipratropium-albuterol  1 ampule Inhalation Q4H WA    bumetanide  2 mg Intravenous BID    sodium zirconium cyclosilicate  5 g Oral TID    ferric gluconate (FERRLECIT) IVPB  100 mg Intravenous Once    sodium chloride flush  10 mL Intravenous 2 times per day    allopurinol  300 mg Oral Daily    levothyroxine  25 mcg Oral Daily    hydrOXYzine  10 mg Oral Nightly    metoprolol succinate HGB 8.8* 8.7* 8.4*   HCT 27.8* 26.8* 26.6*    150 162     BMP:  Recent Labs     06/20/21  0720 06/21/21  0520 06/22/21  0418    141 142   K 4.4 4.4 3.8   * 109* 109*   CO2 21* 21* 21*   BUN 87* 85* 86*   CREATININE 3.1* 3.1* 2.9*   LABGLOM 19 19 21     ABGs: No results found for: PH, PO2, PCO2  INR:   Recent Labs     06/21/21  0748   INR 1.3     PRO-BNP:   Lab Results   Component Value Date    PROBNP 17,337 (H) 04/29/2021    PROBNP 8,244 (H) 02/12/2019      TSH:   Lab Results   Component Value Date    TSH 0.728 12/17/2014      Cardiac Injury Profile: No results for input(s): CKTOTAL, CKMB, TROPONINI in the last 72 hours. Lipid Profile:   Lab Results   Component Value Date    TRIG 87 12/18/2014    HDL 33 12/18/2014    LDLCALC 60 12/18/2014    CHOL 111 12/18/2014      Hemoglobin A1C: No components found for: HGBA1C     RAD:   Echo Complete    Result Date: 6/17/2021  Transthoracic Echocardiography Report (TTE)  Demographics   Patient Name        Luisa Orr  Gender               Male                      T   Medical Record      [id number]       Room Number          0410  Number   Account #           [de-identified]      Procedure Date       06/16/2021   Corporate ID                       Ordering Physician   Yvan Solano   Accession Number    [id number]     Referring Physician  Angelica Garcia   Date of Birth       1940     Sonographer          Ronel MÉNDEZ   Age                 78 year(s)     Interpreting         Ollie Lux DO                                     Physician                                      Any Other  Procedure Type of Study   TTE procedure:Echo Complete W/Doppler & Color Flow. Procedure Date Date: 06/16/2021 Start: 01:04 PM Study Location: Portable Technical Quality: Limited visualization due to patient immobility. Indications:Congestive heart failure.  Patient Status: Routine Height: 69 inches Weight: 242 pounds BSA: 2.24 m^2 BMI: 35.74 kg/m^2 HR: 77 bpm BP: 160/68 mmHg Findings   Left Ventricle  Left ventricle grossly normal in size. Early paradoxical septal motion. Normal left ventricular wall thickness. Doppler/Tissue doppler not obtained. Estimated left ventricular ejection fraction is 45±5%. Right Ventricle  Moderately dilated right ventricle. TAPSE is decreased  Right ventricle global systolic function is moderately reduced . Left Atrium  The left atrium is moderately dilated. The LAESV Index is >34 ml/m2. Interatrial septum appears intact. Right Atrium  Mild-moderately enlarged right atrium. Mitral Valve  Structurally normal mitral valve. Physiologic and/or trace mitral regurgitation is present. Tricuspid Valve  The tricuspid valve appears structurally normal.  Moderate to severe tricuspid regurgitation. Pulmonary hypertension is severe . RVSP is >69 mmHg. Pulmonary hypertension is severe . Aortic Valve  The aortic valve is trileaflet. Decreased aortic valve leaflet excursion. The aortic valve appears moderately sclerotic. Mild aortic stenosis is present. Pulmonic Valve  Pulmonic valve is structurally normal.  Mild pulmonic regurgitation present. Pericardial Effusion  No evidence of pericardial thickening/calcification present. No evidence of pericardial effusion. Aorta  Aortic root dimension within normal limits. Ascending aorta appears mildly sclerotic and/or calcified. Miscellaneous  Dilated Inferior Vena Cava. Inferior Vena Cava, no respiratory variation. Conclusions   Summary  Left ventricle grossly normal in size. Early paradoxical septal motion. Normal left ventricular wall thickness. Doppler/Tissue doppler not obtained. Estimated left ventricular ejection fraction is 45±5%. The LAESV Index is >34 ml/m2. Moderately dilated right ventricle. TAPSE is decreased  Right ventricle global systolic function is moderately reduced . Physiologic and/or trace mitral regurgitation is present.   The aortic valve appears moderately sclerotic. Mild aortic stenosis is present. Moderate to severe tricuspid regurgitation. Pulmonary hypertension is severe . RVSP is >69 mmHg. Pulmonary hypertension is severe . Mild pulmonic regurgitation present. Technically good quality study. Technically difficult study due to patient''s body habitus. Compared to prior echo, changes noted. Suggest clinical correlation.    Signature   ----------------------------------------------------------------  Electronically signed by Ernesto Gore DO(Interpreting  physician) on 06/17/2021 03:35 PM  ----------------------------------------------------------------  M-Mode/2D Measurements & Calculations   LV Diastolic    LV Systolic Dimension: 3.5   AV Cusp Separation: 1.2 cmLA  Dimension: 4.6  cm                           Dimension: 5.8 cmAO Root  cm              LV Volume Diastolic: 80.9 ml Dimension: 3.1 cm  LV FS:23.9 %    LV Volume Systolic: 44.8 ml  LV PW           LV EDV/LV EDV Index: 05.8  Diastolic: 1.1  AB/28 NA/W^9TL ESV/LV ESV  cm              Index: 51.2 ml/23ml/ m^2     RV Diastolic Dimension: 4.7  LV PW Systolic: EF Calculated: 03.1 %        cm  1.2 cm          LV Mass Index: 81 l/min*m^2  Septum                                       LA/Aorta: 3.15  Diastolic: 1.1                               Ascending Aorta: 2.9 cm  cm              LVOT: 2 cm                   LA volume/Index: 114 ml  Septum                                       /23KR/L^9  Systolic: 1.2                                RA Area: 28 cm^2  cm  CO: 5.2 l/min                                IVC Expiration: 3 cm  CI: 2.32  l/m*m^2  LV Mass: 181.22  g  Doppler Measurements & Calculations   MV Peak E-Wave: 1.31 AV Peak Velocity:     LVOT Peak Velocity: 1.08 m/s  m/s                  2.09 m/s              LVOT Mean Velocity: 0.78 m/s                       AV Peak Gradient:     LVOT Peak Gradient: 4.6  MV Peak Gradient:    17.39 mmHg            mmHgLVOT Mean Gradient: 2.5  6.8 mmHg             AV Mean Velocity:     mmHg  MV Mean Gradient:    1.48 m/s              Estimated RVSP: 69.4 mmHg  2.9 mmHg             AV Mean Gradient:     Estimated RAP:15 mmHg  MV Mean Velocity:    10.2 mmHg  0.77 m/s             AV VTI: 44.3 cm  MV Deceleration      AV Area               TR Velocity:3.69 m/s  Time: 219.7 msec     (Continuity):1.52     TR Gradient:54.43 mmHg  MV P1/2t: 62.8 msec  cm^2                  PV Peak Velocity: 0.95 m/s  MVA by PHT:3.5 cm^2                        PV Peak Gradient: 3.57 mmHg  MV Area              LVOT VTI: 21.5 cm     PV Mean Velocity: 0.61 m/s  (continuity): 3.6                          PV Mean Gradient: 1.8 mmHg  cm^2                 Estimated PASP: 69.43  MV E' Septal         mmHg  Velocity: 0.08 m/s  MV E' Lateral  Velocity: 14 m/s  [email or web address] [id number]    CT ABDOMEN PELVIS WO CONTRAST Additional Contrast? None    Result Date: 5/29/2021  EXAMINATION: CT OF THE ABDOMEN AND PELVIS WITHOUT CONTRAST 5/29/2021 8:18 am TECHNIQUE: CT of the abdomen and pelvis was performed without the administration of intravenous contrast. Multiplanar reformatted images are provided for review. Dose modulation, iterative reconstruction, and/or weight based adjustment of the mA/kV was utilized to reduce the radiation dose to as low as reasonably achievable. COMPARISON: CT abdomen and pelvis dated 12/19/2019 HISTORY: ORDERING SYSTEM PROVIDED HISTORY: Abdominal distention TECHNOLOGIST PROVIDED HISTORY: Reason for exam:->Abdominal distention Additional Contrast?->None Decision Support Exception - unselect if not a suspected or confirmed emergency medical condition->Emergency Medical Condition (MA) FINDINGS: Lower Chest: There are moderate left and small right pleural effusions with some compression atelectasis in the left lower lobe. No definite acute airspace disease is seen at either lung base. The heart is top-normal in size. There are mild coronary artery calcifications which is a marker for coronary atherosclerotic disease. Organs: No obvious hepatic, splenic, pancreatic, adrenal or renal mass is seen. A lesion could be missed in the absence of IV contrast.  There is a stable approximately 2 cm hypodensity in the posterior aspect of the lower pole of the right kidney, consistent with a cyst.  There is no evidence of hydronephrosis or renal calculi. The gallbladder is surgically absent. The liver shows slightly nodular contour that can be associated with cirrhosis. GI/Bowel: There is no evidence of bowel obstruction. No evidence of abnormal bowel wall thickening or distension. There is sigmoid diverticulosis without evidence of diverticulitis. Pelvis: Moderate to large amount of free fluid is seen in the pelvis. No pelvic mass or lymphadenopathy is seen. Peritoneum/Retroperitoneum: There is moderate to large amount of ascites. No evidence of retroperitoneal lymphadenopathy. There is no evidence of intraperitoneal lymphadenopathy. Prominent calcified plaque is seen in the abdominal aorta and iliac vessels with no evidence of aneurysm. The bilateral iliac stents are noted. Bones/Soft Tissues:  Age related degenerative changes of the visualized osseous structures without focal destructive lesion. There is diffuse subcutaneous edema, consistent with anasarca. 1. No evidence of bowel obstruction. 2. Moderate to large amount of ascites. Findings consistent with anasarca. 3. Moderate left and small right pleural effusions with mild left basilar atelectasis. 4. Slightly nodular contour of the liver that can be associated with cirrhosis although this is equivocal. 5. Sigmoid diverticulosis. No evidence of diverticulitis.      XR CHEST PORTABLE    Result Date: 6/20/2021  EXAMINATION: ONE XRAY VIEW OF THE CHEST 6/20/2021 9:45 am COMPARISON: 05/29/2021 HISTORY: ORDERING SYSTEM PROVIDED HISTORY: exp wheezes TECHNOLOGIST PROVIDED HISTORY: Reason for exam:->exp wheezes FINDINGS: Stable cardiomediastinal silhouette. Prominent central vessels and prominent bilateral reticular markings. Patchy perihilar opacities. No pneumothorax. Possible small bilateral pleural effusions. No acute osseous abnormality. Findings may be related to mild congestive heart failure with interstitial pulmonary edema. XR CHEST PORTABLE    Result Date: 5/29/2021  EXAMINATION: ONE XRAY VIEW OF THE CHEST 5/29/2021 8:18 am COMPARISON: February 12, 2019 HISTORY: ORDERING SYSTEM PROVIDED HISTORY: Shortness of breath TECHNOLOGIST PROVIDED HISTORY: Reason for exam:->Shortness of breath FINDINGS: The heart has upper borderline size. There is some relative prominence of the central perihilar vessels. No acute infiltrates or consolidations are seen. There is minimal blunting of the left-sided costophrenic sulcus which could represent minimal left-sided pleural effusion. Calcified granuloma likely seen in the right base. Borderline size heart. Slightly prominent perihilar vessels. The cannot exclude minimal left-sided pleural effusion. US ABDOMEN LIMITED    Result Date: 6/18/2021  EXAMINATION: LIMITED ABDOMINAL ULTRASOUND 6/18/2021 12:09 pm COMPARISON: None. HISTORY: ORDERING SYSTEM PROVIDED HISTORY: ascites TECHNOLOGIST PROVIDED HISTORY: Reason for exam:->ascites What reading provider will be dictating this exam?->CRC FINDINGS: Ascites survey of the 4 quadrants of the abdomen was performed. There is a moderate amount of ascites throughout the abdomen most pronounced in the left lower quadrant. Incidental note is made of a small right pleural effusion. Moderate abdominal ascites throughout the abdomen.      US GUIDED PARACENTESIS    Result Date: 6/21/2021  PROCEDURE: ULTRASOUND GUIDED PARACENTESIS 6/21/2021 HISTORY: ORDERING SYSTEM PROVIDED HISTORY: Therapeutic paracentesis TECHNOLOGIST PROVIDED HISTORY: Reason for exam:->Therapeutic paracentesis What reading provider will be dictating this exam?->CRC TECHNIQUE: Informed consent was obtained after a detailed explanation of the procedure including risks, benefits, and alternatives. Universal protocol was followed. The left abdomen was prepped and draped in sterile fashion and local anesthesia was achieved with lidocaine. An 5 [place] needle sheath was advanced under ultrasound guidance into ascites and paracentesis was performed. The patient tolerated the procedure well. FINDINGS: A total of 3970 cc abdominal ascites was removed. 25 g of IV albumin was administered. Successful ultrasound guided paracentesis. US RETROPERITONEAL COMPLETE    Result Date: 6/15/2021  EXAMINATION: RETROPERITONEAL ULTRASOUND OF THE KIDNEYS AND URINARY BLADDER 6/15/2021 COMPARISON: AP CT 05/29/2021 HISTORY: ORDERING SYSTEM PROVIDED HISTORY: rigo TECHNOLOGIST PROVIDED HISTORY: Ascites Reason for exam:->rigo What reading provider will be dictating this exam?->CRC FINDINGS: RENAL MEASUREMENTS: Length of visualized right kidney: 10.8 cm. Right renal cortical thickness: 17 mm. Length of visualized left kidney: 11.3 cm. Left renal cortical thickness: 18 mm. RIGHT KIDNEY: Focal parenchymal or cortical lesion: A non-specific, smoothly marginated, hypoechoic lesion is statistically most likely a cyst.  There is no evidence of mural nodularity, septation, wall thickening, or calcification. It measures 18 mm and is again noted in the right renal lower pole. Possible calculus: None. Hydronephrosis: None. Perinephric fluid: None. LEFT KIDNEY: Upper pole not well visualized sonographically. Focal parenchymal or cortical lesion: None. Possible calculus: None. Hydronephrosis: None. Perinephric fluid: None. URINARY BLADDER: Focal abnormality: None. Wall thickening: None. Prevoid volume: 196cc. Slight to moderate right upper quadrant and pelvic ascites.      No sonographic evidence of acute renal pathology, mass, or calculus. Abdomen and pelvic ascites     US DUP LOWER EXTREMITIES BILATERAL VENOUS    Result Date: 6/18/2021  EXAMINATION: DUPLEX VENOUS ULTRASOUND OF THE BILATERAL LOWER EXTREMITIES, 6/18/2021 7:45 pm TECHNIQUE: Duplex ultrasound using B-mode/gray scaled imaging and Doppler spectral analysis and color flow was obtained of the bilateral lower extremities. COMPARISON: None. HISTORY: ORDERING SYSTEM PROVIDED HISTORY: dvt TECHNOLOGIST PROVIDED HISTORY: Reason for exam:->dvt What reading provider will be dictating this exam?->CRC FINDINGS: Due to anasarca evaluation is somewhat limited. The visualized veins of the bilateral lower extremities are patent and free of echogenic thrombus. The veins demonstrate good compressibility with normal color flow study and spectral analysis. No evidence of DVT in either lower extremity. EKG: See Report  Echo: See Report      IMPRESSIONS:  Principal Problem:    KEANU (acute kidney injury) ([address]  Active Problems:    Ascites    HTN (hypertension), benign    CKD (chronic kidney disease) stage 3, GFR 30-59 ml/min (HCC)    Atrial fibrillation, chronic (HCC)    Obesity (BMI 30-39. 9)    Hyperlipidemia LDL goal <100    Bilateral carotid artery stenosis    Hyperkalemia  Resolved Problems:    * No resolved hospital problems. *      RECOMMENDATIONS:  ***    I have spent more than *** minutes face to face with Cecilia Cruz and reviewing notes and laboratory data, with greater than 50% of this time instructing and counseling the patient *** face to face regarding my findings and recommendations and I have answered all questions as posed to me by Mr. Odalis Beard, DO FACP,FACC,FSCAI      NOTE:  This report was transcribed using voice recognition software.   Every effort was made to ensure accuracy; however, inadvertent computerized transcription errors may be present

## 2021-06-22 NOTE — PROGRESS NOTES
PROGRESS NOTE       PATIENT PROBLEM LIST:  Principal Problem:    KEANU (acute kidney injury) ([facility] [address]  Active Problems:    Ascites    HTN (hypertension), benign    CKD (chronic kidney disease) stage 3, GFR 30-59 ml/min (HCC)    Atrial fibrillation, chronic (HCC)    Obesity (BMI 30-39. 9)    Hyperlipidemia LDL goal <100    Bilateral carotid artery stenosis    Hyperkalemia  Resolved Problems:    * No resolved hospital problems. *      SUBJECTIVE:  Kendy Easter states ***    REVIEW OF SYSTEMS:  General ROS: negative for - fatigue, malaise,  weight gain or weight loss  Psychological ROS: negative for - anxiety , depression  Ophthalmic ROS: negative for - decreased vision or visual distortion. ENT ROS: negative  Allergy and Immunology ROS: negative  Hematological and Lymphatic ROS: negative  Endocrine: no heat or cold intolerance and no polyphagia, polydipsia, or polyuria  Respiratory ROS: positive for - {rosresp symptoms:998883}  Cardiovascular ROS: positive for - {roscv symptoms:934898}. Gastrointestinal ROS: no abdominal pain, change in bowel habits, or black or bloody stools  Genito-Urinary ROS: no nocturia, dysuria, trouble voiding, frequency or hematuria  Musculoskeletal ROS: negative for- myalgias, arthralgias, or claudication  Neurological ROS: no TIA or stroke symptoms otherwise no significant change in symptoms or problems since yesterday as documented in previous progress notes.     SCHEDULED MEDICATIONS:   sildenafil  20 mg Oral TID    hydrALAZINE  50 mg Oral TID    chlorothiazide (DIURIL) IVPB  500 mg Intravenous Q12H    ipratropium-albuterol  1 ampule Inhalation Q4H WA    bumetanide  2 mg Intravenous BID    sodium zirconium cyclosilicate  5 g Oral TID    ferric gluconate (FERRLECIT) IVPB  100 mg Intravenous Once    sodium chloride flush  10 mL Intravenous 2 times per day    allopurinol  300 mg Oral Daily    levothyroxine  25 mcg Oral Daily    hydrOXYzine  10 mg Oral Nightly    metoprolol succinate 50 mg Oral Daily    pantoprazole  40 mg Oral Daily    primidone  100 mg Oral BID    sodium bicarbonate  650 mg Oral BID    tamsulosin  0.4 mg Oral Daily    vitamin D  2,000 Units Oral Daily       VITAL SIGNS:                                                                                                                          BP (!) 172/83   Pulse 75   Temp 97.3 °F (36.3 °C) (Oral)   Resp 16   Ht 5' 9\" (1.753 m)   Wt 230 lb 6.4 oz (104.5 kg)   SpO2 97%   BMI 34.02 kg/m²   Patient Vitals for the past 96 hrs (Last 3 readings):   Weight   06/22/21 0557 230 lb 6.4 oz (104.5 kg)   06/21/21 0520 248 lb 6.4 oz (112.7 kg)   06/20/21 0536 242 lb 9.6 oz (110 kg)     OBJECTIVE:    HEENT: PERRL, EOM  Intact; sclera non-icteric, conjunctiva pink. Carotids are brisk in upstroke with normal contour. No carotid bruits. Normal jugular venous pulsation at 45°. No palpable cervical nor supraclavicular nodes. Thyroid not palpable. Trachea midline. Chest: Even excursion  Lungs: CTA B, no expiratory wheezes or rhonchi, no decreased tactile fremitus without inspiratory rales. Heart: Regular  rhythm; S1 > S2, no gallop or murmur. No clicks, rub, palpable thrills   or heaves. PMI nondisplaced, 5th intercostal space MCL. Abdomen: Soft, nontender, nondistended,  {Blank single:47088::\"scaphoid\",\"mildly protuberant\",\"moderately protuberant\",\"grossly protuberant\"}, no masses or organomegaly. Bowel sounds active. Extremities: Without clubbing, cyanosis or edema. Pulses present 3+ upper extermities bilaterally; {POSITIVE-NEGATIVE PULSES:88290} DP and {POSITIVE-NEGATIVE PULSES:57829} PT bilaterally.      Data:   Scheduled Meds: Reviewed  Continuous Infusions:    sodium chloride         Intake/Output Summary (Last 24 hours) at 6/22/2021 1212  Last data filed at 6/21/2021 1900  Gross per 24 hour   Intake 0 ml   Output 400 ml   Net -400 ml     CBC:   Recent Labs     06/20/21  0720 06/21/21  0520 06/22/21  0418   WBC 5.5 4.8 4.6 HGB 8.8* 8.7* 8.4*   HCT 27.8* 26.8* 26.6*    150 162     BMP:  Recent Labs     06/20/21  0720 06/21/21  0520 06/22/21  0418    141 142   K 4.4 4.4 3.8   * 109* 109*   CO2 21* 21* 21*   BUN 87* 85* 86*   CREATININE 3.1* 3.1* 2.9*   LABGLOM 19 19 21     ABGs: No results found for: PH, PO2, PCO2  INR:   Recent Labs     06/21/21  0748   INR 1.3     PRO-BNP:   Lab Results   Component Value Date    PROBNP 17,337 (H) 04/29/2021    PROBNP 8,244 (H) 02/12/2019      TSH:   Lab Results   Component Value Date    TSH 0.728 12/17/2014      Cardiac Injury Profile: No results for input(s): CKTOTAL, CKMB, TROPONINI in the last 72 hours. Lipid Profile:   Lab Results   Component Value Date    TRIG 87 12/18/2014    HDL 33 12/18/2014    LDLCALC 60 12/18/2014    CHOL 111 12/18/2014      Hemoglobin A1C: No components found for: HGBA1C     RAD:   Echo Complete    Result Date: 6/17/2021  Transthoracic Echocardiography Report (TTE)  Demographics   Patient Name        Philippe Bullard  Gender               Male                      T   Medical Record      [id number]       Room Number          0410  Number   Account #           [de-identified]      Procedure Date       06/16/2021   Corporate ID                       Ordering Physician   Chelo Han   Accession Number    [id number]     Referring Physician  Moncho Botlelo   Date of Birth       1940     Sonographer          Ronel Doe [facility]   Age                 78 year(s)     Interpreting         Adin Bravo DO                                     Physician                                      Any Other  Procedure Type of Study   TTE procedure:Echo Complete W/Doppler & Color Flow. Procedure Date Date: 06/16/2021 Start: 01:04 PM Study Location: Portable Technical Quality: Limited visualization due to patient immobility. Indications:Congestive heart failure.  Patient Status: Routine Height: 69 inches Weight: 242 pounds BSA: 2.24 m^2 BMI: 35.74 kg/m^2 HR: 77 bpm BP: 160/68 mmHg Findings   Left Ventricle  Left ventricle grossly normal in size. Early paradoxical septal motion. Normal left ventricular wall thickness. Doppler/Tissue doppler not obtained. Estimated left ventricular ejection fraction is 45±5%. Right Ventricle  Moderately dilated right ventricle. TAPSE is decreased  Right ventricle global systolic function is moderately reduced . Left Atrium  The left atrium is moderately dilated. The LAESV Index is >34 ml/m2. Interatrial septum appears intact. Right Atrium  Mild-moderately enlarged right atrium. Mitral Valve  Structurally normal mitral valve. Physiologic and/or trace mitral regurgitation is present. Tricuspid Valve  The tricuspid valve appears structurally normal.  Moderate to severe tricuspid regurgitation. Pulmonary hypertension is severe . RVSP is >69 mmHg. Pulmonary hypertension is severe . Aortic Valve  The aortic valve is trileaflet. Decreased aortic valve leaflet excursion. The aortic valve appears moderately sclerotic. Mild aortic stenosis is present. Pulmonic Valve  Pulmonic valve is structurally normal.  Mild pulmonic regurgitation present. Pericardial Effusion  No evidence of pericardial thickening/calcification present. No evidence of pericardial effusion. Aorta  Aortic root dimension within normal limits. Ascending aorta appears mildly sclerotic and/or calcified. Miscellaneous  Dilated Inferior Vena Cava. Inferior Vena Cava, no respiratory variation. Conclusions   Summary  Left ventricle grossly normal in size. Early paradoxical septal motion. Normal left ventricular wall thickness. Doppler/Tissue doppler not obtained. Estimated left ventricular ejection fraction is 45±5%. The LAESV Index is >34 ml/m2. Moderately dilated right ventricle. TAPSE is decreased  Right ventricle global systolic function is moderately reduced . Physiologic and/or trace mitral regurgitation is present.   The aortic valve appears moderately sclerotic. Mild aortic stenosis is present. Moderate to severe tricuspid regurgitation. Pulmonary hypertension is severe . RVSP is >69 mmHg. Pulmonary hypertension is severe . Mild pulmonic regurgitation present. Technically good quality study. Technically difficult study due to patient''s body habitus. Compared to prior echo, changes noted. Suggest clinical correlation.    Signature   ----------------------------------------------------------------  Electronically signed by Stephanie Burgos DO(Interpreting  physician) on 06/17/2021 03:35 PM  ----------------------------------------------------------------  M-Mode/2D Measurements & Calculations   LV Diastolic    LV Systolic Dimension: 3.5   AV Cusp Separation: 1.2 cmLA  Dimension: 4.6  cm                           Dimension: 5.8 cmAO Root  cm              LV Volume Diastolic: 34.6 ml Dimension: 3.1 cm  LV FS:23.9 %    LV Volume Systolic: 11.8 ml  LV PW           LV EDV/LV EDV Index: 33.3  Diastolic: 1.1  HA/14 QX/U^0QY ESV/LV ESV  cm              Index: 51.2 ml/23ml/ m^2     RV Diastolic Dimension: 4.7  LV PW Systolic: EF Calculated: 05.0 %        cm  1.2 cm          LV Mass Index: 81 l/min*m^2  Septum                                       LA/Aorta: 3.66  Diastolic: 1.1                               Ascending Aorta: 2.9 cm  cm              LVOT: 2 cm                   LA volume/Index: 114 ml  Septum                                       /54HG/L^4  Systolic: 1.2                                RA Area: 28 cm^2  cm  CO: 5.2 l/min                                IVC Expiration: 3 cm  CI: 2.32  l/m*m^2  LV Mass: 181.22  g  Doppler Measurements & Calculations   MV Peak E-Wave: 1.31 AV Peak Velocity:     LVOT Peak Velocity: 1.08 m/s  m/s                  2.09 m/s              LVOT Mean Velocity: 0.78 m/s                       AV Peak Gradient:     LVOT Peak Gradient: 4.6  MV Peak Gradient:    17.39 mmHg            mmHgLVOT Mean Gradient: 2.5  6.8 mmHg             AV Mean Velocity:     mmHg  MV Mean Gradient:    1.48 m/s              Estimated RVSP: 69.4 mmHg  2.9 mmHg             AV Mean Gradient:     Estimated RAP:15 mmHg  MV Mean Velocity:    10.2 mmHg  0.77 m/s             AV VTI: 44.3 cm  MV Deceleration      AV Area               TR Velocity:3.69 m/s  Time: 219.7 msec     (Continuity):1.52     TR Gradient:54.43 mmHg  MV P1/2t: 62.8 msec  cm^2                  PV Peak Velocity: 0.95 m/s  MVA by PHT:3.5 cm^2                        PV Peak Gradient: 3.57 mmHg  MV Area              LVOT VTI: 21.5 cm     PV Mean Velocity: 0.61 m/s  (continuity): 3.6                          PV Mean Gradient: 1.8 mmHg  cm^2                 Estimated PASP: 69.43  MV E' Septal         mmHg  Velocity: 0.08 m/s  MV E' Lateral  Velocity: 14 m/s  [email or web address] [id number]    CT ABDOMEN PELVIS WO CONTRAST Additional Contrast? None    Result Date: 5/29/2021  EXAMINATION: CT OF THE ABDOMEN AND PELVIS WITHOUT CONTRAST 5/29/2021 8:18 am TECHNIQUE: CT of the abdomen and pelvis was performed without the administration of intravenous contrast. Multiplanar reformatted images are provided for review. Dose modulation, iterative reconstruction, and/or weight based adjustment of the mA/kV was utilized to reduce the radiation dose to as low as reasonably achievable. COMPARISON: CT abdomen and pelvis dated 12/19/2019 HISTORY: ORDERING SYSTEM PROVIDED HISTORY: Abdominal distention TECHNOLOGIST PROVIDED HISTORY: Reason for exam:->Abdominal distention Additional Contrast?->None Decision Support Exception - unselect if not a suspected or confirmed emergency medical condition->Emergency Medical Condition (MA) FINDINGS: Lower Chest: There are moderate left and small right pleural effusions with some compression atelectasis in the left lower lobe. No definite acute airspace disease is seen at either lung base. The heart is top-normal in size. There are mild coronary artery calcifications which is a marker for coronary atherosclerotic disease. Organs: No obvious hepatic, splenic, pancreatic, adrenal or renal mass is seen. A lesion could be missed in the absence of IV contrast.  There is a stable approximately 2 cm hypodensity in the posterior aspect of the lower pole of the right kidney, consistent with a cyst.  There is no evidence of hydronephrosis or renal calculi. The gallbladder is surgically absent. The liver shows slightly nodular contour that can be associated with cirrhosis. GI/Bowel: There is no evidence of bowel obstruction. No evidence of abnormal bowel wall thickening or distension. There is sigmoid diverticulosis without evidence of diverticulitis. Pelvis: Moderate to large amount of free fluid is seen in the pelvis. No pelvic mass or lymphadenopathy is seen. Peritoneum/Retroperitoneum: There is moderate to large amount of ascites. No evidence of retroperitoneal lymphadenopathy. There is no evidence of intraperitoneal lymphadenopathy. Prominent calcified plaque is seen in the abdominal aorta and iliac vessels with no evidence of aneurysm. The bilateral iliac stents are noted. Bones/Soft Tissues:  Age related degenerative changes of the visualized osseous structures without focal destructive lesion. There is diffuse subcutaneous edema, consistent with anasarca. 1. No evidence of bowel obstruction. 2. Moderate to large amount of ascites. Findings consistent with anasarca. 3. Moderate left and small right pleural effusions with mild left basilar atelectasis. 4. Slightly nodular contour of the liver that can be associated with cirrhosis although this is equivocal. 5. Sigmoid diverticulosis. No evidence of diverticulitis.      XR CHEST PORTABLE    Result Date: 6/20/2021  EXAMINATION: ONE XRAY VIEW OF THE CHEST 6/20/2021 9:45 am COMPARISON: 05/29/2021 HISTORY: ORDERING SYSTEM PROVIDED HISTORY: exp wheezes TECHNOLOGIST PROVIDED HISTORY: Reason for exam:->exp wheezes FINDINGS: Stable cardiomediastinal silhouette. Prominent central vessels and prominent bilateral reticular markings. Patchy perihilar opacities. No pneumothorax. Possible small bilateral pleural effusions. No acute osseous abnormality. Findings may be related to mild congestive heart failure with interstitial pulmonary edema. XR CHEST PORTABLE    Result Date: 5/29/2021  EXAMINATION: ONE XRAY VIEW OF THE CHEST 5/29/2021 8:18 am COMPARISON: February 12, 2019 HISTORY: ORDERING SYSTEM PROVIDED HISTORY: Shortness of breath TECHNOLOGIST PROVIDED HISTORY: Reason for exam:->Shortness of breath FINDINGS: The heart has upper borderline size. There is some relative prominence of the central perihilar vessels. No acute infiltrates or consolidations are seen. There is minimal blunting of the left-sided costophrenic sulcus which could represent minimal left-sided pleural effusion. Calcified granuloma likely seen in the right base. Borderline size heart. Slightly prominent perihilar vessels. The cannot exclude minimal left-sided pleural effusion. US ABDOMEN LIMITED    Result Date: 6/18/2021  EXAMINATION: LIMITED ABDOMINAL ULTRASOUND 6/18/2021 12:09 pm COMPARISON: None. HISTORY: ORDERING SYSTEM PROVIDED HISTORY: ascites TECHNOLOGIST PROVIDED HISTORY: Reason for exam:->ascites What reading provider will be dictating this exam?->CRC FINDINGS: Ascites survey of the 4 quadrants of the abdomen was performed. There is a moderate amount of ascites throughout the abdomen most pronounced in the left lower quadrant. Incidental note is made of a small right pleural effusion. Moderate abdominal ascites throughout the abdomen.      US GUIDED PARACENTESIS    Result Date: 6/21/2021  PROCEDURE: ULTRASOUND GUIDED PARACENTESIS 6/21/2021 HISTORY: ORDERING SYSTEM PROVIDED HISTORY: Therapeutic paracentesis TECHNOLOGIST PROVIDED HISTORY: Reason for exam:->Therapeutic paracentesis What reading provider will be dictating this exam?->CRC TECHNIQUE: Informed consent was obtained after a detailed explanation of the procedure including risks, benefits, and alternatives. Universal protocol was followed. The left abdomen was prepped and draped in sterile fashion and local anesthesia was achieved with lidocaine. An 5 [place] needle sheath was advanced under ultrasound guidance into ascites and paracentesis was performed. The patient tolerated the procedure well. FINDINGS: A total of 3970 cc abdominal ascites was removed. 25 g of IV albumin was administered. Successful ultrasound guided paracentesis. US RETROPERITONEAL COMPLETE    Result Date: 6/15/2021  EXAMINATION: RETROPERITONEAL ULTRASOUND OF THE KIDNEYS AND URINARY BLADDER 6/15/2021 COMPARISON: AP CT 05/29/2021 HISTORY: ORDERING SYSTEM PROVIDED HISTORY: rigo TECHNOLOGIST PROVIDED HISTORY: Ascites Reason for exam:->rigo What reading provider will be dictating this exam?->CRC FINDINGS: RENAL MEASUREMENTS: Length of visualized right kidney: 10.8 cm. Right renal cortical thickness: 17 mm. Length of visualized left kidney: 11.3 cm. Left renal cortical thickness: 18 mm. RIGHT KIDNEY: Focal parenchymal or cortical lesion: A non-specific, smoothly marginated, hypoechoic lesion is statistically most likely a cyst.  There is no evidence of mural nodularity, septation, wall thickening, or calcification. It measures 18 mm and is again noted in the right renal lower pole. Possible calculus: None. Hydronephrosis: None. Perinephric fluid: None. LEFT KIDNEY: Upper pole not well visualized sonographically. Focal parenchymal or cortical lesion: None. Possible calculus: None. Hydronephrosis: None. Perinephric fluid: None. URINARY BLADDER: Focal abnormality: None. Wall thickening: None. Prevoid volume: 196cc. Slight to moderate right upper quadrant and pelvic ascites.      No sonographic evidence of acute renal pathology, mass, or calculus. Abdomen and pelvic ascites     US DUP LOWER EXTREMITIES BILATERAL VENOUS    Result Date: 6/18/2021  EXAMINATION: DUPLEX VENOUS ULTRASOUND OF THE BILATERAL LOWER EXTREMITIES, 6/18/2021 7:45 pm TECHNIQUE: Duplex ultrasound using B-mode/gray scaled imaging and Doppler spectral analysis and color flow was obtained of the bilateral lower extremities. COMPARISON: None. HISTORY: ORDERING SYSTEM PROVIDED HISTORY: dvt TECHNOLOGIST PROVIDED HISTORY: Reason for exam:->dvt What reading provider will be dictating this exam?->CRC FINDINGS: Due to anasarca evaluation is somewhat limited. The visualized veins of the bilateral lower extremities are patent and free of echogenic thrombus. The veins demonstrate good compressibility with normal color flow study and spectral analysis. No evidence of DVT in either lower extremity. EKG: See Report  Echo: See Report      IMPRESSIONS:  Principal Problem:    KEANU (acute kidney injury) ([address]  Active Problems:    Ascites    HTN (hypertension), benign    CKD (chronic kidney disease) stage 3, GFR 30-59 ml/min (HCC)    Atrial fibrillation, chronic (HCC)    Obesity (BMI 30-39. 9)    Hyperlipidemia LDL goal <100    Bilateral carotid artery stenosis    Hyperkalemia  Resolved Problems:    * No resolved hospital problems. *      RECOMMENDATIONS:  ***    I have spent more than *** minutes face to face with Rosalio Alejandro and reviewing notes and laboratory data, with greater than 50% of this time instructing and counseling the patient *** face to face regarding my findings and recommendations and I have answered all questions as posed to me by Mr. Kari Bates, DO FACP,FACC,FSCAI      NOTE:  This report was transcribed using voice recognition software.   Every effort was made to ensure accuracy; however, inadvertent computerized transcription errors may be present

## 2021-06-23 PROBLEM — I27.20 PULMONARY HYPERTENSION, MODERATE TO SEVERE (HCC): Status: ACTIVE | Noted: 2021-01-01

## 2021-06-23 NOTE — DISCHARGE INSTR - COC
Continuity of Care Form    Patient Name: Loren Russo   :  1940  MRN:  [id number]    Admit date:  6/15/2021  Discharge date:      Code Status Order: Full Code   Advance Directives:   [facility] Documentation     Date/Time Healthcare Directive Type of Healthcare Directive Copy in [address] Agent's Name Healthcare Agent's Phone Number    06/15/21 1406  Yes, patient has an advance directive for healthcare treatment  Durable power of  for health care  No, copy requested from family  -- granddaughter POA  --  --    06/15/21 0941  No, patient does not have an advance directive for healthcare treatment  --  --  --  --  --    21 1350  No, patient does not have an advance directive for healthcare treatment  --  --  --  --  --          Admitting Physician:  Celia Almanzar DO  PCP: Sushma Coe DO    Discharging Nurse: [facility] Unit/Room#: 9235/0469-T  Discharging Unit Phone Number: ***    Emergency Contact:   Extended Emergency Contact Information  Primary Emergency Contact: [address] [place] [address] Phone: [phone]  Mobile Phone: [phone]  Relation: Grandchild  Preferred language: English   needed? No  Secondary Emergency Contact: Digna Nowak   [address] Phone: [phone]  Mobile Phone: [phone]  Relation: Child  Preferred language: English   needed?  No    Past Surgical History:  Past Surgical History:   Procedure Laterality Date    CAROTID ENDARTERECTOMY      CARPAL TUNNEL RELEASE Bilateral     CHOLECYSTECTOMY      COLONOSCOPY      FINGER TRIGGER RELEASE Bilateral     KNEE SURGERY Right     PARACENTESIS N/A 6/15/2021    ULTRASOUND GUIDED PARACENTESIS performed by Aletha John MD at [address] [facility] Bilateral     UPPER GASTROINTESTINAL ENDOSCOPY N/A 2019    EGD BIOPSY performed by Aletha John MD at [address] Immunization History:   Immunization History   Administered Date(s) Administered    Pneumococcal Polysaccharide ([id number]) 12/19/2014       Active Problems:  Patient Active Problem List   Diagnosis Code    HTN (hypertension), benign I10    CKD (chronic kidney disease) stage 3, GFR 30-59 ml/min ([facility]) N18.30    Atrial fibrillation, chronic ([facility]) I48.20    Obesity (BMI 30-39. 9) E66.9    Hyperlipidemia LDL goal <100 E78.5    Bilateral carotid artery stenosis I65.23    Hyperkalemia E87.5    KEANU (acute kidney injury) ([facility]) N17.9    Ascites R18.8    Pulmonary hypertension,  severe ([facility]) I27.20       Isolation/Infection:   Isolation          No Isolation        Patient Infection Status     None to display          Nurse Assessment:  Last Vital Signs: BP (!) 149/66   Pulse 76   Temp 97.8 °F (36.6 °C) (Oral)   Resp 18   Ht 5' 9\" (1.753 m)   Wt 232 lb 3.2 oz (105.3 kg)   SpO2 96%   BMI 34.29 kg/m²     Last documented pain score (0-10 scale): Pain Level: 0  Last Weight:   Wt Readings from Last 1 Encounters:   06/23/21 232 lb 3.2 oz (105.3 kg)     Mental Status:  {IP PT MENTAL STATUS:51060}    IV Access:  { CHERYL IV ACCESS:152534181}    Nursing Mobility/ADLs:  Walking   {P DME FYYT:126367506}  Transfer  {P DME CCBH:915686294}  Bathing  {P DME HBYQ:079022028}  Dressing  {P DME AQWD:765536638}  Toileting  {P DME ZUKR:875148984}  Feeding  {P DME DAGW:234322436}  Med Admin  {P DME QQNN:534943178}  Med Delivery   { CHERYL MED Delivery:843936540}    Wound Care Documentation and Therapy:  Puncture 06/15/21 Abdomen Lateral;Right;Mid (Active)   Wound Assessment Clean;Dry; Intact 06/23/21 1022   Julianne-wound Assessment Intact 06/23/21 1022   Closure Sutures 06/23/21 1022   Culture Taken Yes 06/15/21 1126   Drainage Amount None 06/23/21 1022   Dressing/Treatment Dry dressing 06/23/21 1022   Dressing Changed Dressing reinforced 06/23/21 1022   Dressing Status Clean;Dry; Intact 06/23/21 1022   Number of SECTION    Prognosis: {Prognosis:3068816425}    Condition at Discharge: Rekha Vazquez Patient Condition:594611697}    Rehab Potential (if transferring to Rehab): {Prognosis:6558534256}    Recommended Labs or Other Treatments After Discharge: ***    Physician Certification: I certify the above information and transfer of Antoinette Bowen  is necessary for the continuing treatment of the diagnosis listed and that he requires {Admit to Appropriate Level of Care:65227} for {GREATER/LESS:944285509} 30 days.      Update Admission H&P: {CHP DME Changes in LVPRM:205288699}    PHYSICIAN SIGNATURE:  {Esignature:589031671}

## 2021-06-23 NOTE — PLAN OF CARE
Problem: Skin Integrity:  Goal: Will show no infection signs and symptoms  Description: Will show no infection signs and symptoms  Outcome: Met This Shift  Goal: Absence of new skin breakdown  Description: Absence of new skin breakdown  Outcome: Met This Shift     Problem: Falls - Risk of:  Goal: Will remain free from falls  Description: Will remain free from falls  Outcome: Met This Shift     Problem:  Activity:  Goal: Risk for activity intolerance will decrease  Description: Risk for activity intolerance will decrease  Outcome: Met This Shift

## 2021-06-23 NOTE — PROGRESS NOTES
Internal Medicine Progress Note    Patient's name: Kendy Wyman  : 1940  Chief complaints (on day of admission): Hyperkalemia  Admission date: 6/15/2021  Date of service: 2021   Room: [address] INTERNAL MEDICINE  Primary care physician: Ronnie Pandya DO  Reason for visit: Follow-up for KEANU/hyperkalemia     Subjective  Nehal Augustin was seen and examined. He was resting comfortably in his bed. He is urinating frequently. He is tolerating diuretics. He has not been out of bed much today. Granddaughter called at [phone]. Case discussed at length. She now wants him to go to the [facility] for [facility]. Review of Systems  There are no new complaints of chest pain, abdominal pain, nausea, vomiting, diarrhea, constipation.     Hospital Medications  Current Facility-Administered Medications   Medication Dose Route Frequency Provider Last Rate Last Admin    sildenafil (REVATIO) tablet 20 mg  20 mg Oral TID Binua Sharon, DO   20 mg at 21 0810    hydrALAZINE (APRESOLINE) tablet 50 mg  50 mg Oral TID Niraj Herrera DO   50 mg at 21 0810    bumetanide (BUMEX) 12.5 mg in sodium chloride 0.9 % 125 mL infusion  1 mg/hr Intravenous Continuous Suellen Sandhoff, MD 10 mL/hr at 21 0802 1 mg/hr at 21 0802    chlorothiazide (DIURIL) 500 mg in sodium chloride 0.9 % 50 mL IVPB  500 mg Intravenous Q12H Suellen Sandhoff, MD   Stopped at 21 1041    ipratropium-albuterol (DUONEB) nebulizer solution 1 ampule  1 ampule Inhalation Q4H WA Arturo Blakely MD   1 ampule at 21 1929    acetaminophen (TYLENOL) tablet 650 mg  650 mg Oral Q6H PRN Abhi Hodges DO        sodium zirconium cyclosilicate (LOKELMA) oral suspension 5 g  5 g Oral TID Ethan Diaz MD   5 g at 21 0810    ferric gluconate (FERRLECIT) 100 mg in sodium chloride 0.9 % 100 mL IVPB  100 mg Intravenous Once Ethan Diaz MD        sodium chloride flush 0.9 % injection 10 mL  10 mL Intravenous 2 times per day Abhi HAMPTON Volino, DO   10 mL at 06/22/21 2116    sodium chloride flush 0.9 % injection 10 mL  10 mL Intravenous PRN Abhi Hodges, DO        0.9 % sodium chloride infusion  25 mL Intravenous PRN Abhi Chowdhuryino, DO        ondansetron (ZOFRAN-ODT) disintegrating tablet 4 mg  4 mg Oral Q8H PRN Abhi Hodges, DO        Or    ondansetron (ZOFRAN) injection 4 mg  4 mg Intravenous Q6H PRN Abhi Hodges, DO        senna (SENOKOT) tablet 8.6 mg  1 tablet Oral Daily PRN Abhi Hodges, DO        allopurinol (ZYLOPRIM) tablet 300 mg  300 mg Oral Daily Abhi Hodges, DO   300 mg at 06/23/21 4121    levothyroxine (SYNTHROID) tablet 25 mcg  25 mcg Oral Daily Abhi Hodges, DO   25 mcg at 06/23/21 0537    hydrOXYzine (ATARAX) tablet 10 mg  10 mg Oral Nightly Abhi Hodges, DO   10 mg at 06/22/21 2107    metoprolol succinate (TOPROL XL) extended release tablet 50 mg  50 mg Oral Daily Abhi Hodges, DO   50 mg at 06/23/21 0810    pantoprazole (PROTONIX) tablet 40 mg  40 mg Oral Daily Abhi Hodges, DO   40 mg at 06/23/21 0810    primidone (MYSOLINE) tablet 100 mg  100 mg Oral BID Abhi Hodges, DO   100 mg at 06/23/21 0810    sodium bicarbonate tablet 650 mg  650 mg Oral BID Abhi Hodges, DO   650 mg at 06/23/21 0809    tamsulosin (FLOMAX) capsule 0.4 mg  0.4 mg Oral Daily Abhi Hodges, DO   0.4 mg at 06/23/21 0810    vitamin D (CHOLECALCIFEROL) tablet 2,000 Units  2,000 Units Oral Daily Abhi Hodges, DO   2,000 Units at 06/23/21 8221    perflutren lipid microspheres (DEFINITY) injection 1.65 mg  1.5 mL Intravenous ONCE PRN Abhi Hodges, DO        dextrose 50 % IV solution  25 g Intravenous PRN Pretty Arrieta MD           PRN Medications  acetaminophen, sodium chloride flush, sodium chloride, ondansetron **OR** ondansetron, senna, perflutren lipid microspheres, dextrose    Objective  Most Recent Recorded Vitals  BP (!) 149/66   Pulse 76   Temp 97.8 °F (36.6 °C) (Oral)   Resp 18   Ht 5' 9\" (1.753 m)   Wt 232 lb 3.2 oz (105.3 kg)   SpO2 96%   BMI 34.29 kg/m²   I/O last 3 completed shifts: In: 180 [P.O.:180]  Out: 750 [Urine:750]  I/O this shift: In: 12 [P.O.:560]  Out: 3250 [Urine:3250]    Physical Exam:   General: AAO to person/place/time/purpose, NAD, no labored breathing, flat affect  Eyes: conjunctivae/corneas clear, sclera non icteric  Skin: color/texture/turgor normal, no rashes or lesions  Lungs: Diminished but CTAB, no retractions/use of accessory muscles, no vocal fremitus, no rhonchi, no crackle, no rales  Heart: regular rate, regular rhythm, systolic murmur  Abdomen: distended with mild fluid wave, NT, bowel sounds normal  Extremities: atraumatic, LE edema slightly worse than yesterday  Neurologic: cranial nerves 2-12 grossly intact, no slurred speech    Most Recent Labs  Lab Results   Component Value Date    WBC 5.4 06/23/2021    HGB 8.9 (L) 06/23/2021    HCT 27.2 (L) 06/23/2021     (L) 06/23/2021     06/22/2021    K 3.8 06/22/2021     (H) 06/22/2021    CREATININE 2.9 (H) 06/22/2021    BUN 86 (H) 06/22/2021    CO2 21 (L) 06/22/2021    GLUCOSE 92 06/22/2021    ALT 12 06/22/2021    AST 32 06/22/2021    INR 1.3 06/21/2021    TSH 0.728 12/17/2014       US GUIDED PARACENTESIS   Final Result   Successful ultrasound guided paracentesis. XR CHEST PORTABLE   Final Result   Findings may be related to mild congestive heart failure with interstitial   pulmonary edema. US DUP LOWER EXTREMITIES BILATERAL VENOUS   Final Result   No evidence of DVT in either lower extremity. US ABDOMEN LIMITED   Final Result   Moderate abdominal ascites throughout the abdomen. US RETROPERITONEAL COMPLETE   Final Result   No sonographic evidence of acute renal pathology, mass, or calculus.       Abdomen and pelvic ascites           CT ABDOMEN PELVIS WO CONTRAST Additional Contrast? None    Result Date: 5/29/2021  EXAMINATION: CT OF THE ABDOMEN AND PELVIS WITHOUT CONTRAST 5/29/2021 Prominent calcified plaque is seen in the abdominal aorta and iliac vessels with no evidence of aneurysm. The bilateral iliac stents are noted. Bones/Soft Tissues:  Age related degenerative changes of the visualized osseous structures without focal destructive lesion. There is diffuse subcutaneous edema, consistent with anasarca. 1. No evidence of bowel obstruction. 2. Moderate to large amount of ascites. Findings consistent with anasarca. 3. Moderate left and small right pleural effusions with mild left basilar atelectasis. 4. Slightly nodular contour of the liver that can be associated with cirrhosis although this is equivocal. 5. Sigmoid diverticulosis. No evidence of diverticulitis. XR CHEST PORTABLE    Result Date: 5/29/2021  EXAMINATION: ONE XRAY VIEW OF THE CHEST 5/29/2021 8:18 am COMPARISON: February 12, 2019 HISTORY: ORDERING SYSTEM PROVIDED HISTORY: Shortness of breath TECHNOLOGIST PROVIDED HISTORY: Reason for exam:->Shortness of breath FINDINGS: The heart has upper borderline size. There is some relative prominence of the central perihilar vessels. No acute infiltrates or consolidations are seen. There is minimal blunting of the left-sided costophrenic sulcus which could represent minimal left-sided pleural effusion. Calcified granuloma likely seen in the right base. Borderline size heart. Slightly prominent perihilar vessels. The cannot exclude minimal left-sided pleural effusion. Echo 6/17/21   Left ventricle grossly normal in size. Early paradoxical septal motion. Normal left ventricular wall thickness. Doppler/Tissue doppler not obtained. Estimated left ventricular ejection fraction is 45±5%. The LAESV Index is >34 ml/m2. Moderately dilated right ventricle. TAPSE is decreased   Right ventricle global systolic function is moderately reduced . Physiologic and/or trace mitral regurgitation is present. The aortic valve appears moderately sclerotic.    Mild aortic stenosis is present. Moderate to severe tricuspid regurgitation. Pulmonary hypertension is severe . RVSP is >69 mmHg. Pulmonary hypertension is severe . Mild pulmonic regurgitation present. Technically good quality study. Technically difficult study due to patient''s body habitus. Compared to prior echo, changes noted. Suggest clinical correlation. Assessment   Active Hospital Problems    Diagnosis     KEANU (acute kidney injury) ([facility] [address] [N17.9]      Priority: High    Ascites [R18.8]      Priority: Medium    Hyperkalemia [E87.5]      Priority: Medium    CKD (chronic kidney disease) stage 3, GFR 30-59 ml/min (HCC) [N18.30]      Priority: Medium    Bilateral carotid artery stenosis [I65.23]     Obesity (BMI 30-39. 9) [E66.9]     Hyperlipidemia LDL goal <100 [E78.5]     Atrial fibrillation, chronic (HCC) [I48.20]     HTN (hypertension), benign [I10]          Plan  · KEANU on CKD 3 with hyperkalemia: Baseline sCr between 1.4 and 2.4. Follow BMP. Defer diuresis/HD to nephrology- IVF stopped. Hold lisinopril. Continue allopurinol/sodium bicarb. Lokelma per renal.  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 GI following for interpretation of ascitic fluid studies and cirrhosis w/u. Hepatitis panel negative. Noted echo and cardio consult. Pulmonology following. Sp IR therapeutic paracentesis 6/21 (~4000 cc removed). Revatio. · History of rectal cancer: General surgery following. · Macrocytic anemia: Noted Fe/Ferritin/TIBC/vitamin B12/folate. Follow H&H.  · PT AM-PAC-- 17/24  · Follow labs  · DVT prophylaxis. · Please see orders for further management and care.   ·  for discharge planning  · Discharge plan: [facility] once transfer accepted-- [facility] called and I am awaiting a call back    Electronically signed by Kristal Lopes DO on 6/23/2021 at 11:16 AM

## 2021-06-23 NOTE — PROGRESS NOTES
This discussed with cardiology at the [facility] clinic, Dr. Jayce Levine. They accepted patient for transfer. The floor will be called for a bed assignment when there is a bed available.     Electronically signed by Nataly Eisenberg DO on 6/23/2021 at 11:57 AM

## 2021-06-23 NOTE — PROGRESS NOTES
Nurse to nurse report called to SUSAN MEYER [facility] at [facility]. Chart and med list faxed over. Granddaughter updated with information.

## 2021-06-23 NOTE — CARE COORDINATION
6/23/2021  Social Work Discharge Planning:Pt is on a bumex drip. Plan is home with [facility] and [facility]. Order is in. Room air. Electronically signed by PAUL Gamino on 6/23/2021 at 9:00 AM    6/23/2021 Social Work Discharge Planning:SW received a call from Pts granddaughter Stephanie Delgado who informed that after having a long conversation with Pts PCP she and Pt have decided that after all fluid is off Pt they want him to go to [facility]  and would like Dr. Manny Michele notified. NICKIE called Dr. Manny Michele to update on Pts wishes. Dr. Manny Michele said he will call Stephanie Delgado to discuss further.  Electronically signed by PAUL Gamino on 6/23/2021 at 10:52 AM

## 2021-06-23 NOTE — PROGRESS NOTES
PROGRESS NOTE       PATIENT PROBLEM LIST:  Principal Problem:    KEANU (acute kidney injury) ([facility] [address]  Active Problems:    Ascites    HTN (hypertension), benign    CKD (chronic kidney disease) stage 3, GFR 30-59 ml/min (HCC)    Atrial fibrillation, chronic (HCC)    Obesity (BMI 30-39. 9)    Hyperlipidemia LDL goal <100    Bilateral carotid artery stenosis    Hyperkalemia    Pulmonary hypertension,  severe (HCC)  Resolved Problems:    * No resolved hospital problems. *      SUBJECTIVE:  Shandra Leong states he feels significantly improved and much less short of breath and seems alert as well. Is also noted that the edema in his lower extremities has significantly improved including his left hand and forearm. Improved diuresis noted with decrease in creatinine. REVIEW OF SYSTEMS:  General ROS: negative for - fatigue, malaise,  weight gain or weight loss  Psychological ROS: negative for - anxiety , depression  Ophthalmic ROS: negative for - decreased vision or visual distortion. ENT ROS: negative  Allergy and Immunology ROS: negative  Hematological and Lymphatic ROS: negative  Endocrine: no heat or cold intolerance and no polyphagia, polydipsia, or polyuria  Respiratory ROS: positive for - shortness of breath-markedly improved   Cardiovascular ROS: positive for - irregular heartbeat, murmur and shortness of breath. Gastrointestinal ROS: no abdominal pain, change in bowel habits, or black or bloody stools  Genito-Urinary ROS: no nocturia, dysuria, trouble voiding, frequency or hematuria  Musculoskeletal ROS: negative for- myalgias, arthralgias, or claudication  Neurological ROS: no TIA or stroke symptoms otherwise no significant change in symptoms or problems since yesterday as documented in previous progress notes.     SCHEDULED MEDICATIONS:   [START ON 6/24/2021] nebivolol  10 mg Oral Daily    sildenafil  20 mg Oral TID    hydrALAZINE  50 mg Oral TID    chlorothiazide (DIURIL) IVPB  500 mg Intravenous Q12H    ipratropium-albuterol  1 ampule Inhalation Q4H WA    sodium zirconium cyclosilicate  5 g Oral TID    ferric gluconate (FERRLECIT) IVPB  100 mg Intravenous Once    sodium chloride flush  10 mL Intravenous 2 times per day    allopurinol  300 mg Oral Daily    levothyroxine  25 mcg Oral Daily    pantoprazole  40 mg Oral Daily    primidone  100 mg Oral BID    sodium bicarbonate  650 mg Oral BID    tamsulosin  0.4 mg Oral Daily    vitamin D  2,000 Units Oral Daily       VITAL SIGNS:                                                                                                                          BP (!) 149/66   Pulse 76   Temp 97.8 °F (36.6 °C) (Oral)   Resp 18   Ht 5' 9\" (1.753 m)   Wt 232 lb 3.2 oz (105.3 kg)   SpO2 96%   BMI 34.29 kg/m²   Patient Vitals for the past 96 hrs (Last 3 readings):   Weight   06/23/21 0625 232 lb 3.2 oz (105.3 kg)   06/22/21 0557 230 lb 6.4 oz (104.5 kg)   06/21/21 0520 248 lb 6.4 oz (112.7 kg)     OBJECTIVE:    HEENT: PERRL, EOM  Intact; sclera non-icteric, conjunctiva pink. Carotids are brisk in upstroke with normal contour. No carotid bruits. Normal jugular venous pulsation at 45°. No palpable cervical nor supraclavicular nodes. Thyroid not palpable. Trachea midline. Chest: Even excursion  Lungs: CTA B, no expiratory wheezes or rhonchi, no decreased tactile fremitus without inspiratory rales. Heart: Irregular, irregular rhythm; S1 > S2, no gallop grade 2/6 early systolic murmur left upper sternal border. . No clicks, rub, palpable thrills   or heaves. PMI nondisplaced, 5th intercostal space MCL. Abdomen: Soft, nontender, nondistended,  moderately protuberant, no masses or organomegaly. Bowel sounds active. Extremities: Without clubbing, cyanosis or edema. Pulses present 3+ upper extermities bilaterally; present 1+ DP and barely palpable PT bilaterally.      Data:   Scheduled Meds: Reviewed  Continuous Infusions:    bumetanide 0.1 mg/mL infusion 1 mg/hr (06/23/21 7355)    sodium chloride         Intake/Output Summary (Last 24 hours) at 6/23/2021 1249  Last data filed at 6/23/2021 1002  Gross per 24 hour   Intake 740 ml   Output 4000 ml   Net -3260 ml     CBC:   Recent Labs     06/21/21  0520 06/22/21  0418 06/23/21  0922   WBC 4.8 4.6 5.4   HGB 8.7* 8.4* 8.9*   HCT 26.8* 26.6* 27.2*    162 116*     BMP:  Recent Labs     06/21/21  0520 06/22/21  0418 06/23/21  1120    142 141   K 4.4 3.8 3.6   * 109* 106   CO2 21* 21* 23   BUN 85* 86* 80*   CREATININE 3.1* 2.9* 2.7*   LABGLOM 19 21 23     ABGs: No results found for: PH, PO2, PCO2  INR:   Recent Labs     06/21/21  0748   INR 1.3     PRO-BNP:   Lab Results   Component Value Date    PROBNP 17,337 (H) 04/29/2021    PROBNP 8,244 (H) 02/12/2019      TSH:   Lab Results   Component Value Date    TSH 0.728 12/17/2014      Cardiac Injury Profile: No results for input(s): CKTOTAL, CKMB, TROPONINI in the last 72 hours. Lipid Profile:   Lab Results   Component Value Date    TRIG 87 12/18/2014    HDL 33 12/18/2014    LDLCALC 60 12/18/2014    CHOL 111 12/18/2014      Hemoglobin A1C: No components found for: HGBA1C     RAD:   Echo Complete    Result Date: 6/17/2021  Transthoracic Echocardiography Report (TTE)  Demographics   Patient Name        Beka Reyes  Gender               Male                      T   Medical Record      [id number]       Room Number          0410  Number   Account #           [de-identified]      Procedure Date       06/16/2021   Corporate ID                       Ordering Physician   Chetna Pelayo   Accession Number    [id number]     Referring Physician  Rodriguez Pérez   Date of Birth       1940     Sonographer          Ronel Dacosta [facility]   Age                 78 year(s)     Interpreting         Edis Agudelo DO                                     Physician                                      Any Other  Procedure Type of Study   TTE procedure:Echo Complete W/Doppler & Color Flow.   Procedure Date Date: 06/16/2021 Start: 01:04 PM Study Location: Portable Technical Quality: Limited visualization due to patient immobility. Indications:Congestive heart failure. Patient Status: Routine Height: 69 inches Weight: 242 pounds BSA: 2.24 m^2 BMI: 35.74 kg/m^2 HR: 77 bpm BP: 160/68 mmHg  Findings   Left Ventricle  Left ventricle grossly normal in size. Early paradoxical septal motion. Normal left ventricular wall thickness. Doppler/Tissue doppler not obtained. Estimated left ventricular ejection fraction is 45±5%. Right Ventricle  Moderately dilated right ventricle. TAPSE is decreased  Right ventricle global systolic function is moderately reduced . Left Atrium  The left atrium is moderately dilated. The LAESV Index is >34 ml/m2. Interatrial septum appears intact. Right Atrium  Mild-moderately enlarged right atrium. Mitral Valve  Structurally normal mitral valve. Physiologic and/or trace mitral regurgitation is present. Tricuspid Valve  The tricuspid valve appears structurally normal.  Moderate to severe tricuspid regurgitation. Pulmonary hypertension is severe . RVSP is >69 mmHg. Pulmonary hypertension is severe . Aortic Valve  The aortic valve is trileaflet. Decreased aortic valve leaflet excursion. The aortic valve appears moderately sclerotic. Mild aortic stenosis is present. Pulmonic Valve  Pulmonic valve is structurally normal.  Mild pulmonic regurgitation present. Pericardial Effusion  No evidence of pericardial thickening/calcification present. No evidence of pericardial effusion. Aorta  Aortic root dimension within normal limits. Ascending aorta appears mildly sclerotic and/or calcified. Miscellaneous  Dilated Inferior Vena Cava. Inferior Vena Cava, no respiratory variation. Conclusions   Summary  Left ventricle grossly normal in size. Early paradoxical septal motion. Normal left ventricular wall thickness. Doppler/Tissue doppler not obtained.   Estimated left ventricular ejection fraction is 45±5%. The LAESV Index is >34 ml/m2. Moderately dilated right ventricle. TAPSE is decreased  Right ventricle global systolic function is moderately reduced . Physiologic and/or trace mitral regurgitation is present. The aortic valve appears moderately sclerotic. Mild aortic stenosis is present. Moderate to severe tricuspid regurgitation. Pulmonary hypertension is severe . RVSP is >69 mmHg. Pulmonary hypertension is severe . Mild pulmonic regurgitation present. Technically good quality study. Technically difficult study due to patient''s body habitus. Compared to prior echo, changes noted. Suggest clinical correlation.    Signature   ----------------------------------------------------------------  Electronically signed by Crow Laughlin DO(Interpreting  physician) on 06/17/2021 03:35 PM  ----------------------------------------------------------------  M-Mode/2D Measurements & Calculations   LV Diastolic    LV Systolic Dimension: 3.5   AV Cusp Separation: 1.2 cmLA  Dimension: 4.6  cm                           Dimension: 5.8 cmAO Root  cm              LV Volume Diastolic: 04.0 ml Dimension: 3.1 cm  LV FS:23.9 %    LV Volume Systolic: 01.7 ml  LV PW           LV EDV/LV EDV Index: 33.8  Diastolic: 1.1  EE/16 TU/J^6PU ESV/LV ESV  cm              Index: 51.2 ml/23ml/ m^2     RV Diastolic Dimension: 4.7  LV PW Systolic: EF Calculated: 45.8 %        cm  1.2 cm          LV Mass Index: 81 l/min*m^2  Septum                                       LA/Aorta: 2.25  Diastolic: 1.1                               Ascending Aorta: 2.9 cm  cm              LVOT: 2 cm                   LA volume/Index: 114 ml  Septum                                       /93PG/I^4  Systolic: 1.2                                RA Area: 28 cm^2  cm  CO: 5.2 l/min                                IVC Expiration: 3 cm  CI: 2.32  l/m*m^2  LV Mass: 181.22  g  Doppler Measurements & Calculations   MV Peak E-Wave: 1.31 AV Peak or confirmed emergency medical condition->Emergency Medical Condition (MA) FINDINGS: Lower Chest: There are moderate left and small right pleural effusions with some compression atelectasis in the left lower lobe. No definite acute airspace disease is seen at either lung base. The heart is top-normal in size. There are mild coronary artery calcifications which is a marker for coronary atherosclerotic disease. Organs: No obvious hepatic, splenic, pancreatic, adrenal or renal mass is seen. A lesion could be missed in the absence of IV contrast.  There is a stable approximately 2 cm hypodensity in the posterior aspect of the lower pole of the right kidney, consistent with a cyst.  There is no evidence of hydronephrosis or renal calculi. The gallbladder is surgically absent. The liver shows slightly nodular contour that can be associated with cirrhosis. GI/Bowel: There is no evidence of bowel obstruction. No evidence of abnormal bowel wall thickening or distension. There is sigmoid diverticulosis without evidence of diverticulitis. Pelvis: Moderate to large amount of free fluid is seen in the pelvis. No pelvic mass or lymphadenopathy is seen. Peritoneum/Retroperitoneum: There is moderate to large amount of ascites. No evidence of retroperitoneal lymphadenopathy. There is no evidence of intraperitoneal lymphadenopathy. Prominent calcified plaque is seen in the abdominal aorta and iliac vessels with no evidence of aneurysm. The bilateral iliac stents are noted. Bones/Soft Tissues:  Age related degenerative changes of the visualized osseous structures without focal destructive lesion. There is diffuse subcutaneous edema, consistent with anasarca. 1. No evidence of bowel obstruction. 2. Moderate to large amount of ascites. Findings consistent with anasarca. 3. Moderate left and small right pleural effusions with mild left basilar atelectasis.  4. Slightly nodular contour of the liver that can be associated with cirrhosis although this is equivocal. 5. Sigmoid diverticulosis. No evidence of diverticulitis. XR CHEST PORTABLE    Result Date: 6/20/2021  EXAMINATION: ONE XRAY VIEW OF THE CHEST 6/20/2021 9:45 am COMPARISON: 05/29/2021 HISTORY: ORDERING SYSTEM PROVIDED HISTORY: exp wheezes TECHNOLOGIST PROVIDED HISTORY: Reason for exam:->exp wheezes FINDINGS: Stable cardiomediastinal silhouette. Prominent central vessels and prominent bilateral reticular markings. Patchy perihilar opacities. No pneumothorax. Possible small bilateral pleural effusions. No acute osseous abnormality. Findings may be related to mild congestive heart failure with interstitial pulmonary edema. XR CHEST PORTABLE    Result Date: 5/29/2021  EXAMINATION: ONE XRAY VIEW OF THE CHEST 5/29/2021 8:18 am COMPARISON: February 12, 2019 HISTORY: ORDERING SYSTEM PROVIDED HISTORY: Shortness of breath TECHNOLOGIST PROVIDED HISTORY: Reason for exam:->Shortness of breath FINDINGS: The heart has upper borderline size. There is some relative prominence of the central perihilar vessels. No acute infiltrates or consolidations are seen. There is minimal blunting of the left-sided costophrenic sulcus which could represent minimal left-sided pleural effusion. Calcified granuloma likely seen in the right base. Borderline size heart. Slightly prominent perihilar vessels. The cannot exclude minimal left-sided pleural effusion. US ABDOMEN LIMITED    Result Date: 6/18/2021  EXAMINATION: LIMITED ABDOMINAL ULTRASOUND 6/18/2021 12:09 pm COMPARISON: None. HISTORY: ORDERING SYSTEM PROVIDED HISTORY: ascites TECHNOLOGIST PROVIDED HISTORY: Reason for exam:->ascites What reading provider will be dictating this exam?->CRC FINDINGS: Ascites survey of the 4 quadrants of the abdomen was performed. There is a moderate amount of ascites throughout the abdomen most pronounced in the left lower quadrant. Incidental note is made of a small right pleural effusion. Moderate abdominal ascites throughout the abdomen. US GUIDED PARACENTESIS    Result Date: 6/21/2021  PROCEDURE: ULTRASOUND GUIDED PARACENTESIS 6/21/2021 HISTORY: ORDERING SYSTEM PROVIDED HISTORY: Therapeutic paracentesis TECHNOLOGIST PROVIDED HISTORY: Reason for exam:->Therapeutic paracentesis What reading provider will be dictating this exam?->CRC TECHNIQUE: Informed consent was obtained after a detailed explanation of the procedure including risks, benefits, and alternatives. Universal protocol was followed. The left abdomen was prepped and draped in sterile fashion and local anesthesia was achieved with lidocaine. An 5 Vietnamese needle sheath was advanced under ultrasound guidance into ascites and paracentesis was performed. The patient tolerated the procedure well. FINDINGS: A total of 3970 cc abdominal ascites was removed. 25 g of IV albumin was administered. Successful ultrasound guided paracentesis. US RETROPERITONEAL COMPLETE    Result Date: 6/15/2021  EXAMINATION: RETROPERITONEAL ULTRASOUND OF THE KIDNEYS AND URINARY BLADDER 6/15/2021 COMPARISON: AP CT 05/29/2021 HISTORY: ORDERING SYSTEM PROVIDED HISTORY: rigo TECHNOLOGIST PROVIDED HISTORY: Ascites Reason for exam:->rigo What reading provider will be dictating this exam?->CRC FINDINGS: RENAL MEASUREMENTS: Length of visualized right kidney: 10.8 cm. Right renal cortical thickness: 17 mm. Length of visualized left kidney: 11.3 cm. Left renal cortical thickness: 18 mm. RIGHT KIDNEY: Focal parenchymal or cortical lesion: A non-specific, smoothly marginated, hypoechoic lesion is statistically most likely a cyst.  There is no evidence of mural nodularity, septation, wall thickening, or calcification. It measures 18 mm and is again noted in the right renal lower pole. Possible calculus: None. Hydronephrosis: None. Perinephric fluid: None. LEFT KIDNEY: Upper pole not well visualized sonographically. Focal parenchymal or cortical lesion: None. Possible calculus: None. Hydronephrosis: None. Perinephric fluid: None. URINARY BLADDER: Focal abnormality: None. Wall thickening: None. Prevoid volume: 196cc. Slight to moderate right upper quadrant and pelvic ascites. No sonographic evidence of acute renal pathology, mass, or calculus. Abdomen and pelvic ascites     US DUP LOWER EXTREMITIES BILATERAL VENOUS    Result Date: 6/18/2021  EXAMINATION: DUPLEX VENOUS ULTRASOUND OF THE BILATERAL LOWER EXTREMITIES, 6/18/2021 7:45 pm TECHNIQUE: Duplex ultrasound using B-mode/gray scaled imaging and Doppler spectral analysis and color flow was obtained of the bilateral lower extremities. COMPARISON: None. HISTORY: ORDERING SYSTEM PROVIDED HISTORY: dvt TECHNOLOGIST PROVIDED HISTORY: Reason for exam:->dvt What reading provider will be dictating this exam?->CRC FINDINGS: Due to anasarca evaluation is somewhat limited. The visualized veins of the bilateral lower extremities are patent and free of echogenic thrombus. The veins demonstrate good compressibility with normal color flow study and spectral analysis. No evidence of DVT in either lower extremity. EKG: See Report  Echo: See Report      IMPRESSIONS:  Principal Problem:    KEANU (acute kidney injury) ([address]  Active Problems:    Ascites    HTN (hypertension), benign    CKD (chronic kidney disease) stage 3, GFR 30-59 ml/min (HCC)    Atrial fibrillation, chronic (HCC)    Obesity (BMI 30-39. 9)    Hyperlipidemia LDL goal <100    Bilateral carotid artery stenosis    Hyperkalemia    Pulmonary hypertension,  severe (HCC)  Resolved Problems:    * No resolved hospital problems. *      RECOMMENDATIONS:  Will I have empirically initiated sildenafil is quite remarkable that he has noted a difference in <24 hours and even his affect has improved. The edema in his lower extremities has reduced as well and will now switch metoprolol to nebivolol which has specific benefit in patients with pulmonary hypertension.   Nonetheless he will be referred to the [facility] for definitive input. Additionally, he will need  full evaluation for potential amyloid heart disease considering his history of bilateral carpal tunnel surgery. I have spent more than 28 minutes face to face with Shandra Leong and reviewing notes and laboratory data, with greater than 50% of this time instructing and counseling the patient face to face regarding my findings and recommendations and I have answered all questions as posed to me by Mr. Bud Johansen, DO FACP,FACC,FSCAI      NOTE:  This report was transcribed using voice recognition software.   Every effort was made to ensure accuracy; however, inadvertent computerized transcription errors may be present

## 2021-06-23 NOTE — PROGRESS NOTES
Patient has bed assignment at [facility]. Bed J70 #5. Nurse to nurse number . Dr. Kishor Bermudez notified.

## 2021-06-23 NOTE — PROGRESS NOTES
Fortified Pudding Oral Supplement     EXAM:  General: No distress. Alert. Eyes: PERRL. No sclera icterus. No conjunctival injection. ENT: No discharge. Pharynx clear. Neck: Trachea midline. Normal thyroid. Resp: No accessory muscle use. No rales. No wheezing. No rhonchi. CV: Regular rate. Regular rhythm. No murmur or rub. Abd: Non-tender. Non-distended. No masses. No organomegaly. Normal bowel sounds. Skin: Warm and dry. No nodule on exposed extremities. No rash on exposed extremities. Ext: No cyanosis, clubbing, edema  Lymph: No cervical LAD. No supraclavicular LAD. M/S: No cyanosis. No joint deformity. No clubbing. Neuro: Awake. Follows commands. Positive pupils/gag/corneals. Normal pain response. Results:  CBC:   Recent Labs     06/21/21  0520 06/22/21 0418 06/23/21  0922   WBC 4.8 4.6 5.4   HGB 8.7* 8.4* 8.9*   HCT 26.8* 26.6* 27.2*   .6* 104.7* 105.0*    162 116*     BMP:   Recent Labs     06/21/21  0520 06/22/21  0418 06/23/21  1120    142 141   K 4.4 3.8 3.6   * 109* 106   CO2 21* 21* 23   BUN 85* 86* 80*   CREATININE 3.1* 2.9* 2.7*     LIVER PROFILE:   Recent Labs     06/21/21  0520 06/22/21  0418   AST 27 32   ALT 11 12   BILITOT 0.6 0.6   ALKPHOS 242* 254*     PT/INR:   Recent Labs     06/21/21  0748   PROTIME 13.6*   INR 1.3     APTT: No results for input(s): APTT in the last 72 hours. Pathology:  1. N/A      Microbiology:  1. None    Recent ABG:   No results for input(s): PH, PO2, PCO2, HCO3, BE, O2SAT, METHB, O2HB, COHB, O2CON, HHB, THB in the last 72 hours. Recent Films:  US GUIDED PARACENTESIS   Final Result   Successful ultrasound guided paracentesis. XR CHEST PORTABLE   Final Result   Findings may be related to mild congestive heart failure with interstitial   pulmonary edema. US DUP LOWER EXTREMITIES BILATERAL VENOUS   Final Result   No evidence of DVT in either lower extremity.          US ABDOMEN LIMITED   Final Result Moderate abdominal ascites throughout the abdomen. US RETROPERITONEAL COMPLETE   Final Result   No sonographic evidence of acute renal pathology, mass, or calculus. Abdomen and pelvic ascites           Assessment:  1. Pulmonary hypertension with right heart failure. This is resulted in significant peripheral edema and ascites. It is unlikely that the pulmonary hypertension is from significant nocturnal hypoxia or thromboembolic disease. Nonetheless, because of the asymmetric swelling of lower extremities, DVT is excluded. Valvular heart disease may be underlying. Type I pulmonary hypertension is thought to be less likely. Obstructive airways is thought to be present but likely not to cause this degree of difficulty. 2. CKD 4 per nephrology  3. Valvular heart disease with well preserved ejection fraction  4. No evidence of DVT, CTEPH is not suspected.        Plan:  1. CKD per nephrology  2. Agree with tertiary center referral for right heart cath.       Time at the bedside, reviewing labs and radiographs, reviewing updated notes and consultations, discussing with staff and family was more than 35 minutes. Please note that voice recognition technology was used in the preparation of this note and make therefore it may contain inadvertent transcription errors. If the patient is a COVID 19 isolation patient, the above physical exam reflects that of the examining physician for the day. Mil Kingsley MD,  MJEANNINE., F.C.C.P.     [facility], [address] [place] [address], [address], [facility]

## 2021-06-23 NOTE — PROGRESS NOTES
[facility]  HEMATOLOGY/ONCOLOGY OFFICE VISIT    CHIEF COMPLAINT:   Cancer (Malignant neoplasm of ovary, unspecified laterality )      HISTORY OF PRESENT ILLNESS:  Yolanda Cowart is a 64 year old female who presents for evaluation of ovarian cancer.    Mild neuropathy in feet have resolved.    Since her surgery, she is slowly getting her strength back over her body. This fatigue is now moderate. There is no associated fevers or sweats. It is getting better with time.    Still on full liquids.      PAST HISTORIES:  Past medical history, surgical history, family history, and social history were reviewed and updated.  Problem List           ICD-10-CM Noted       Oncology Problems    Malignant neoplasm of ovary, unspecified laterality (CMS/HCC) C56.9 5/6/2019    Overview Addendum 12/16/2019  4:37 PM by Aaron Pat MD     4/2/19 pelvic ultrasound. 4.8 cm pelvic mass with 1.9 cm ovarian mass.     4/3/19 MRI pelvis. History of vaginal mass with right pelvic sidewall lymphadenopathy.     Right retroperitoneal lymph node biopsy: Metastatic high-grade serous mullerian carcinoma.     4/16/19 exploratory laparotomy, BSO, omentectomy, peritoneal stripping, periaortic and perihepatic lymphadenectomy. Greater than 1 cm residual disease in the celiac trunk, and your duodenum and stomach close to contact of SMA. Pelvic disease not resected to do this.    Neg germline and somatic testing     4/23/19: Admitted with ascites and effusion. Peritoneal fluid. Positive for high-grade serous carcinoma.  6/20/19: s/p neoadjuvant carbo / taxol  X 3    7/29/19: Status post JAMIL, debulking tumor, gastrectomy, transverse colectomy, low anterior resection, diverting loop ileostomy  A.  Uterus, partial hysterectomy:  High-grade serous carcinoma involving the endometrium with minimal (less than 1 mm) invasion.  High-grade serous carcinoma also involves uterine serosa and invades into the myometrium.  B.  Rectum, anterior,  Physical Therapy  Facility/Department: [address] INTERNAL MEDICINE 2    NAME: Kendy Wyman  : 1940  MRN: [id number]     Chart reviewed and PT treatment attempted this am.  Pt declined at this time. Will check back at later time/date.      Debi Teague, [address] biopsy:  Involved by high-grade serous carcinoma.  C.  Colon, transverse and portion of stomach wall, resection:  Involved by high-grade serous carcinoma forming a 4.1 x 2 x 1.2 cm mesenteric mass.  The colonic mucosa is unremarkable.  D.  Peritoneum, right pelvic sidewall, excision:  Involved by high-grade serous carcinoma.  E.  Rectum, anterior No. 2, biopsy:  Involved by high-grade serous carcinoma.  F.  Peritoneum, right pelvic No. 2, excision:  Involved by high-grade serous carcinoma.  G.  Cervix, trachelectomy:  Foci of high-grade serous carcinoma in the fibromuscular wall of the cervix.  H.  Peritoneum, cul-de-sac, biopsy:  Involved by high-grade serous carcinoma.  I.  Peritoneum, right pelvic sidewall No. 3, excision: Involved by high-grade serous carcinoma.  J.  Rectum, excision:  Perirectal soft tissue involved by high-grade serous carcinoma.    At the end of the procedure there was < 1 cm residual disease at the level of the duodenum - ligament of Treitz, and < 0.5 cm limited scarred inflammatory tissue in the pelvis    10/10/19: s/p carbo / taxol x3, adjuvant  11/7/19:   194  12/12/19:   590                    Cancer Staging Summary for Yolanda Cowart     Malignant neoplasm of ovary, unspecified laterality (CMS/HCC)     Stage Date Classification Stage Status    Not entered Clinical FIGO Stage IIIC (cT3c, cN1b, cM0) Signed by Aaron Pat MD on 5/6/19                MEDICATIONS / ALLERGIES:  Medications and allergies were reviewed and updated.    Review of systems:  Constitutional: Negative for fever, chills, change in appetite or fatigue.  Skin: Negative for rash or wounds.   HEENT: Negative for eye drainage, rhinorrhea, ear pain, sore throat or neck pain.  Respiratory: Negative for cough, wheezing or shortness of breath.    Cardiovascular: Negative for chest pain, chest pressure, palpitations or diaphoresis.   Gastrointestinal: Negative for nausea, vomiting, diarrhea, abdominal pain, black  or tarry stools.  Genitourinary: Negative for dysuria, urgency, frequency, hematuria or flank pain.  Extremities: Negative for joint swelling or joint pain.  Neurologic: Negative for change in sensory or motor function.  Negative for headache, change in gait, vertigo, vision or speech.  Endocrine: Negative for heat or cold intolerance, weight loss or gain.  Hematological: Negative for bleeding, bruising or lymphadenopathy.  Psychiatric: Negative for change in affect, anxiety, depression, insomnia, mentation or sleep disturbance.    PHYSICAL EXAM:  Vital Signs:   Oncology Encounter Vitals [01/30/20 1414]   ONC OP Encounter Vitals Group      /58      Pulse 81      Resp 16      Temp 97.9 °F (36.6 °C)      Temp src Oral      SpO2 99 %      Weight 134 lb (60.8 kg)      Height       Pain Score  0      Pain Location       Pain Education?       BSA (Calculated - m2) - [employer]       BMI (Calculated)      ECOG Performance Status: 0 - Fully active, able to carry on all predisease activities without restrictions.  General: The patient is alert, well-developed, well-nourished, no distress.  Skin: Warm, normal color, normal texture, normal turgor and without rash.  Head: Normocephalic, atraumatic.  Neck: Supple with no significant adenopathy.  Eyes: Normal conjunctivae and sclerae. Pupils equal, round, reactive to light and accommodation. Extraocular movements intact.  ENT: Mucous membranes are moist. Normal nose,mouth and throat.  Cardiovascular: Regular rate and rhythm, no murmurs, gallops or rubs.  Respiratory: Normal respiratory effort. Clear to auscultation. No wheezes, rales, or rhonchi.  Abdomen: Abdomen is soft and non-tender with active bowel sounds. There is no detected hepatosplenomegaly, masses, or ascites.  Extremities: No clubbing, no cyanosis, no edema. Normal muscle tone and development bilaterally.  Lymph: No cervical, supraclavicular, axillary, inguinal lymphadenopathy.  Neurologic: Motor strength  normal. Coordination normal. No tremor noted.  Psychiatric: Cooperative. Appropriate mood and affect. Normal judgment.    DATA REVIEWED:  Laboratory Data:  Recent Labs   Lab 01/30/20  1412   WBC 7.1   RBC 3.77*   HGB 11.5*   HCT 34.7*   MCV 92.0      Absolute Neutrophil 4.3   Absolute Lymph 2.3   Absolute Mono 0.3   Absolute Eos 0.1   Absolute Baso 0.0     Recent Labs   Lab 01/23/20  1325  11/07/19  1027   Glucose 117*   < > 107*   Sodium 138   < > 138   Potassium 3.8   < > 3.3*   Chloride 101   < > 100   Carbon Dioxide 28   < > 30   BUN 15   < > 18   Creatinine 0.82   < > 0.91   CALCIUM 9.1   < > 9.0   MAGNESIUM  --   --  1.7   TOTAL PROTEIN 7.6   < > 7.6   Albumin 3.6   < > 3.5*   AST/SGOT 20   < > 20   ALK PHOSPHATASE 73   < > 88   ALT/SGPT 24   < > 36    < > = values in this interval not displayed.     Recent Labs   Lab 01/23/20  1325   Anion Gap 13   GLOBULIN 4.0   TOTAL BILIRUBIN 0.3     CANCER  (Units/mL)   Date Value   01/09/2020 1,801 (H)         Imaging Studies:    ASSESSMENT AND PLAN:  1. Ovarian CA:  She has recurrent disease.  We tried NRG-, but she is out due to her SBO.  Today is C1d8 doxil.  Recent echo is good.  We will continue to monitor closely for toxicities.  2. Neuropathy:  Resolved.  3. Genetic counseling: Somatic and germ line testing were negative    F/u 3w for C2    The patient,  indicated understanding of the diagnosis and agreed with the plan of care. The patient is encouraged to call between now and next visit with any problems, questions or concerns that arise.

## 2021-06-26 NOTE — DISCHARGE SUMMARY
Internal Medicine Discharge Summary    NAME: Ana Maria Quintanilla :  1940  MRN:  [id number] PCP:Stephen Marcelo DO    ADMITTED: 6/15/2021   DISCHARGED: 2021  3:40 PM    ADMITTING PHYSICIAN: Tracie    PCP: Lizette Suresh DO    CONSULTANT(S):   IP CONSULT TO NEPHROLOGY  IP CONSULT TO GI  IP CONSULT TO GI  IP CONSULT TO NEPHROLOGY  IP CONSULT TO SOCIAL WORK  IP CONSULT TO CARDIOLOGY  IP CONSULT TO CARDIOLOGY  IP CONSULT TO PULMONOLOGY  IP CONSULT TO CARDIOLOGY  IP CONSULT TO HOME CARE NEEDS     ADMITTING DIAGNOSIS:   KEANU (acute kidney injury) ([address] [N17.9]     Please see H&P for further details    DISCHARGE DIAGNOSES:   Active Hospital Problems    Diagnosis     KEANU (acute kidney injury) ([address] [N17.9]      Priority: High    Ascites [R18.8]      Priority: Medium    Hyperkalemia [E87.5]      Priority: Medium    CKD (chronic kidney disease) stage 3, GFR 30-59 ml/min ([address] [N18.30]      Priority: Medium    Pulmonary hypertension,  severe (HCC) [I27.20]     Bilateral carotid artery stenosis [I65.23]     Obesity (BMI 30-39. 9) [E66.9]     Hyperlipidemia LDL goal <100 [E78.5]     Atrial fibrillation, chronic (HCC) [I48.20]     HTN (hypertension), benign [I10]        BRIEF HISTORY OF PRESENT ILLNESS: Ana Maria Quintanilla is a 78 y.o. male patient of Lizette Suresh DO who  has a past medical history of Ascites, Atrial fibrillation ([address] Bilateral carotid artery stenosis, CKD (chronic kidney disease) stage 3, GFR 30-59 ml/min ([address] Hyperlipidemia, Hypertension, and Obesity (BMI 30-39.9). who originally had no chief complaint listed for this encounter. at presentation on 6/15/2021, and was found to have KEANU (acute kidney injury) ([address] [N17.9] after workup. Please see H&P for further details. HOSPITAL COURSE:   The patient presented to the hospital with the chief complaint of keanu. The patient was admitted to the hospital.     · KEANU on CKD 3 with hyperkalemia: Baseline sCr between 1.4 and 2.4.  Follow BMP.   Defer diuresis/HD to nephrology- IVF stopped. Hold lisinopril.  Continue allopurinol/sodium bicarb. Lazara Crews per renal.  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 Rosa Perez following for interpretation of ascitic fluid studies and cirrhosis w/u.  Hepatitis panel negative. Noted echo and cardio consult. Pulmonology following. Sp IR therapeutic paracentesis 6/21 (~4000 cc removed). Revatio. · History of rectal cancer: General surgery following. · Macrocytic anemia: Noted Fe/Ferritin/TIBC/vitamin B12/folate. Follow H&H.  · PT AM-PAC-- 17/24  · DC'ed to [facility] for RHC and pulmonary HTN specialist    BRIEF PHYSICAL EXAMINATION AND LABORATORIES ON DAY OF DISCHARGE:  VITALS:  BP (!) 155/63   Pulse 72   Temp 97.8 °F (36.6 °C) (Oral)   Resp 16   Ht 5' 9\" (1.753 m)   Wt 232 lb 3.2 oz (105.3 kg)   SpO2 97%   BMI 34.29 kg/m²     Please see note from the same day. LABS[de-identified]  Recent Labs     06/23/21  1120      K 3.6      CO2 23   BUN 80*   CREATININE 2.7*   GLUCOSE 134*   CALCIUM 8.9     No results for input(s): ALKPHOS, ALT, AST, PROT, BILITOT, BILIDIR, LABALBU in the last 72 hours. Recent Labs     06/23/21  0922   WBC 5.4   RBC 2.59*   HGB 8.9*   HCT 27.2*   .0*   MCH 34.4   MCHC 32.7   RDW 18.4*   *   MPV 14.1*     No results found for: LABA1C  Lab Results   Component Value Date    INR 1.3 06/21/2021    INR 1.2 06/15/2021    INR 1.5 03/08/2020    PROTIME 13.6 (H) 06/21/2021    PROTIME 13.4 (H) 06/15/2021    PROTIME 16.2 (H) 03/08/2020      Lab Results   Component Value Date    TSH 0.728 12/17/2014    TSH 1.782 05/19/2012     Lab Results   Component Value Date    TRIG 87 12/18/2014    HDL 33 12/18/2014    LDLCALC 60 12/18/2014     No results for input(s): MG in the last 72 hours. No results for input(s): CKTOTAL, CKMB, TROPONINI in the last 72 hours. No results for input(s): LACTA in the last 72 hours.     IMAGING:  Echo Complete    Result Date: 6/17/2021  Transthoracic Echocardiography Report (TTE)  Demographics   Patient Name        Luisa Orr  Gender               Male                      T   Medical Record      [id number]       Room Number          0410  Number   Account #           [de-identified]      Procedure Date       06/16/2021   Corporate ID                       Ordering Physician   Yvan Solano   Accession Number    [id number]     Referring Physician  Angelica Garcia   Date of Birth       1940     Sonographer          Ronel MÉNDEZ   Age                 78 year(s)     Interpreting         Ollie Lux DO                                     Physician                                      Any Other  Procedure Type of Study   TTE procedure:Echo Complete W/Doppler & Color Flow. Procedure Date Date: 06/16/2021 Start: 01:04 PM Study Location: Portable Technical Quality: Limited visualization due to patient immobility. Indications:Congestive heart failure. Patient Status: Routine Height: 69 inches Weight: 242 pounds BSA: 2.24 m^2 BMI: 35.74 kg/m^2 HR: 77 bpm BP: 160/68 mmHg  Findings   Left Ventricle  Left ventricle grossly normal in size. Early paradoxical septal motion. Normal left ventricular wall thickness. Doppler/Tissue doppler not obtained. Estimated left ventricular ejection fraction is 45±5%. Right Ventricle  Moderately dilated right ventricle. TAPSE is decreased  Right ventricle global systolic function is moderately reduced . Left Atrium  The left atrium is moderately dilated. The LAESV Index is >34 ml/m2. Interatrial septum appears intact. Right Atrium  Mild-moderately enlarged right atrium. Mitral Valve  Structurally normal mitral valve. Physiologic and/or trace mitral regurgitation is present. Tricuspid Valve  The tricuspid valve appears structurally normal.  Moderate to severe tricuspid regurgitation. Pulmonary hypertension is severe . RVSP is >69 mmHg. Pulmonary hypertension is severe .    Aortic Valve  The aortic valve is trileaflet. Decreased aortic valve leaflet excursion. The aortic valve appears moderately sclerotic. Mild aortic stenosis is present. Pulmonic Valve  Pulmonic valve is structurally normal.  Mild pulmonic regurgitation present. Pericardial Effusion  No evidence of pericardial thickening/calcification present. No evidence of pericardial effusion. Aorta  Aortic root dimension within normal limits. Ascending aorta appears mildly sclerotic and/or calcified. Miscellaneous  Dilated Inferior Vena Cava. Inferior Vena Cava, no respiratory variation. Conclusions   Summary  Left ventricle grossly normal in size. Early paradoxical septal motion. Normal left ventricular wall thickness. Doppler/Tissue doppler not obtained. Estimated left ventricular ejection fraction is 45±5%. The LAESV Index is >34 ml/m2. Moderately dilated right ventricle. TAPSE is decreased  Right ventricle global systolic function is moderately reduced . Physiologic and/or trace mitral regurgitation is present. The aortic valve appears moderately sclerotic. Mild aortic stenosis is present. Moderate to severe tricuspid regurgitation. Pulmonary hypertension is severe . RVSP is >69 mmHg. Pulmonary hypertension is severe . Mild pulmonic regurgitation present. Technically good quality study. Technically difficult study due to patient''s body habitus. Compared to prior echo, changes noted. Suggest clinical correlation.    Signature   ----------------------------------------------------------------  Electronically signed by Jenna GAUTAMInterpreting  physician) on 06/17/2021 03:35 PM  ----------------------------------------------------------------  M-Mode/2D Measurements & Calculations   LV Diastolic    LV Systolic Dimension: 3.5   AV Cusp Separation: 1.2 cmLA  Dimension: 4.6  cm                           Dimension: 5.8 cmAO Root  cm              LV Volume Diastolic: 03.3 ml Dimension: 3.1 cm  LV FS:23.9 %    LV Volume Systolic: 94.5 ml  LV PW           LV EDV/LV EDV Index: 17.4  Diastolic: 1.1  MU/26 CK/H^6EU ESV/LV ESV  cm              Index: 51.2 ml/23ml/ m^2     RV Diastolic Dimension: 4.7  LV PW Systolic: EF Calculated: 34.5 %        cm  1.2 cm          LV Mass Index: 81 l/min*m^2  Septum                                       LA/Aorta: 0.30  Diastolic: 1.1                               Ascending Aorta: 2.9 cm  cm              LVOT: 2 cm                   LA volume/Index: 114 ml  Septum                                       /73SN/W^1  Systolic: 1.2                                RA Area: 28 cm^2  cm  CO: 5.2 l/min                                IVC Expiration: 3 cm  CI: 2.32  l/m*m^2  LV Mass: 181.22  g  Doppler Measurements & Calculations   MV Peak E-Wave: 1.31 AV Peak Velocity:     LVOT Peak Velocity: 1.08 m/s  m/s                  2.09 m/s              LVOT Mean Velocity: 0.78 m/s                       AV Peak Gradient:     LVOT Peak Gradient: 4.6  MV Peak Gradient:    17.39 mmHg            mmHgLVOT Mean Gradient: 2.5  6.8 mmHg             AV Mean Velocity:     mmHg  MV Mean Gradient:    1.48 m/s              Estimated RVSP: 69.4 mmHg  2.9 mmHg             AV Mean Gradient:     Estimated RAP:15 mmHg  MV Mean Velocity:    10.2 mmHg  0.77 m/s             AV VTI: 44.3 cm  MV Deceleration      AV Area               TR Velocity:3.69 m/s  Time: 219.7 msec     (Continuity):1.52     TR Gradient:54.43 mmHg  MV P1/2t: 62.8 msec  cm^2                  PV Peak Velocity: 0.95 m/s  MVA by PHT:3.5 cm^2                        PV Peak Gradient: 3.57 mmHg  MV Area              LVOT VTI: 21.5 cm     PV Mean Velocity: 0.61 m/s  (continuity): 3.6                          PV Mean Gradient: 1.8 mmHg  cm^2                 Estimated PASP: 69.43  MV E' Septal         mmHg  Velocity: 0.08 m/s  MV E' Lateral  Velocity: 14 m/s  [email or web address] [id number]%3d    CT ABDOMEN PELVIS WO CONTRAST Additional Contrast? None    Result Date: 5/29/2021  EXAMINATION: CT OF THE ABDOMEN AND PELVIS WITHOUT CONTRAST 5/29/2021 8:18 am TECHNIQUE: CT of the abdomen and pelvis was performed without the administration of intravenous contrast. Multiplanar reformatted images are provided for review. Dose modulation, iterative reconstruction, and/or weight based adjustment of the mA/kV was utilized to reduce the radiation dose to as low as reasonably achievable. COMPARISON: CT abdomen and pelvis dated 12/19/2019 HISTORY: ORDERING SYSTEM PROVIDED HISTORY: Abdominal distention TECHNOLOGIST PROVIDED HISTORY: Reason for exam:->Abdominal distention Additional Contrast?->None Decision Support Exception - unselect if not a suspected or confirmed emergency medical condition->Emergency Medical Condition (MA) FINDINGS: Lower Chest: There are moderate left and small right pleural effusions with some compression atelectasis in the left lower lobe. No definite acute airspace disease is seen at either lung base. The heart is top-normal in size. There are mild coronary artery calcifications which is a marker for coronary atherosclerotic disease. Organs: No obvious hepatic, splenic, pancreatic, adrenal or renal mass is seen. A lesion could be missed in the absence of IV contrast.  There is a stable approximately 2 cm hypodensity in the posterior aspect of the lower pole of the right kidney, consistent with a cyst.  There is no evidence of hydronephrosis or renal calculi. The gallbladder is surgically absent. The liver shows slightly nodular contour that can be associated with cirrhosis. GI/Bowel: There is no evidence of bowel obstruction. No evidence of abnormal bowel wall thickening or distension. There is sigmoid diverticulosis without evidence of diverticulitis. Pelvis: Moderate to large amount of free fluid is seen in the pelvis.   No pelvic mass or blunting of the left-sided costophrenic sulcus which could represent minimal left-sided pleural effusion. Calcified granuloma likely seen in the right base. Borderline size heart. Slightly prominent perihilar vessels. The cannot exclude minimal left-sided pleural effusion. US ABDOMEN LIMITED    Result Date: 6/18/2021  EXAMINATION: LIMITED ABDOMINAL ULTRASOUND 6/18/2021 12:09 pm COMPARISON: None. HISTORY: ORDERING SYSTEM PROVIDED HISTORY: ascites TECHNOLOGIST PROVIDED HISTORY: Reason for exam:->ascites What reading provider will be dictating this exam?->CRC FINDINGS: Ascites survey of the 4 quadrants of the abdomen was performed. There is a moderate amount of ascites throughout the abdomen most pronounced in the left lower quadrant. Incidental note is made of a small right pleural effusion. Moderate abdominal ascites throughout the abdomen. US GUIDED PARACENTESIS    Result Date: 6/21/2021  PROCEDURE: ULTRASOUND GUIDED PARACENTESIS 6/21/2021 HISTORY: ORDERING SYSTEM PROVIDED HISTORY: Therapeutic paracentesis TECHNOLOGIST PROVIDED HISTORY: Reason for exam:->Therapeutic paracentesis What reading provider will be dictating this exam?->CRC TECHNIQUE: Informed consent was obtained after a detailed explanation of the procedure including risks, benefits, and alternatives. Universal protocol was followed. The left abdomen was prepped and draped in sterile fashion and local anesthesia was achieved with lidocaine. An 5 Urdu needle sheath was advanced under ultrasound guidance into ascites and paracentesis was performed. The patient tolerated the procedure well. FINDINGS: A total of 3970 cc abdominal ascites was removed. 25 g of IV albumin was administered. Successful ultrasound guided paracentesis.      US RETROPERITONEAL COMPLETE    Result Date: 6/15/2021  EXAMINATION: RETROPERITONEAL ULTRASOUND OF THE KIDNEYS AND URINARY BLADDER 6/15/2021 COMPARISON: AP CT 05/29/2021 HISTORY: ORDERING SYSTEM PROVIDED HISTORY: rigo TECHNOLOGIST PROVIDED HISTORY: Ascites Reason for exam:->rigo What reading provider will be dictating this exam?->CRC FINDINGS: RENAL MEASUREMENTS: Length of visualized right kidney: 10.8 cm. Right renal cortical thickness: 17 mm. Length of visualized left kidney: 11.3 cm. Left renal cortical thickness: 18 mm. RIGHT KIDNEY: Focal parenchymal or cortical lesion: A non-specific, smoothly marginated, hypoechoic lesion is statistically most likely a cyst.  There is no evidence of mural nodularity, septation, wall thickening, or calcification. It measures 18 mm and is again noted in the right renal lower pole. Possible calculus: None. Hydronephrosis: None. Perinephric fluid: None. LEFT KIDNEY: Upper pole not well visualized sonographically. Focal parenchymal or cortical lesion: None. Possible calculus: None. Hydronephrosis: None. Perinephric fluid: None. URINARY BLADDER: Focal abnormality: None. Wall thickening: None. Prevoid volume: 196cc. Slight to moderate right upper quadrant and pelvic ascites. No sonographic evidence of acute renal pathology, mass, or calculus. Abdomen and pelvic ascites     US DUP LOWER EXTREMITIES BILATERAL VENOUS    Result Date: 6/18/2021  EXAMINATION: DUPLEX VENOUS ULTRASOUND OF THE BILATERAL LOWER EXTREMITIES, 6/18/2021 7:45 pm TECHNIQUE: Duplex ultrasound using B-mode/gray scaled imaging and Doppler spectral analysis and color flow was obtained of the bilateral lower extremities. COMPARISON: None. HISTORY: ORDERING SYSTEM PROVIDED HISTORY: dvt TECHNOLOGIST PROVIDED HISTORY: Reason for exam:->dvt What reading provider will be dictating this exam?->CRC FINDINGS: Due to anasarca evaluation is somewhat limited. The visualized veins of the bilateral lower extremities are patent and free of echogenic thrombus. The veins demonstrate good compressibility with normal color flow study and spectral analysis. No evidence of DVT in either lower extremity. MICROBIOLOGY:  BLOOD CX #1  No results for input(s): BC in the last 72 hours. BLOOD CX #2  No results for input(s): Gevena Luke in the last 72 hours. TIP CULTURE  No results for input(s): CXCATHTIP in the last 72 hours. CULTURE, RESPIRATORY   No results for input(s): CULTRESP in the last 72 hours. RESPIRATORY SMEAR  No results for input(s): RESPSMEAR in the last 72 hours. Echo 6/17/21   Left ventricle grossly normal in size.   Early paradoxical septal motion.   Normal left ventricular wall thickness.   Doppler/Tissue doppler not obtained.   Estimated left ventricular ejection fraction is 45±5%.   The LAESV Index is >34 ml/m2.   Moderately dilated right ventricle.   TAPSE is decreased   Right ventricle global systolic function is moderately reduced .   Physiologic and/or trace mitral regurgitation is present.   The aortic valve appears moderately sclerotic.   Mild aortic stenosis is present.   Moderate to severe tricuspid regurgitation.   Pulmonary hypertension is severe .  RVSP is >69 mmHg.   Pulmonary hypertension is severe .   Mild pulmonic regurgitation present.   Technically good quality study.   Technically difficult study due to patient''s body habitus.   Compared to prior echo, changes noted.   Suggest clinical correlation. DISPOSITION:  The patient's condition is fair.    The patient is being discharged to the [facility]    DISCHARGE MEDICATIONS:    [facility] Medication Instructions [facility]:[id number]    Printed on:06/26/21 [id number]   Medication Information                      allopurinol (ZYLOPRIM) 300 MG tablet  Take 300 mg by mouth daily             amLODIPine (NORVASC) 10 MG tablet  Take 1 tablet by mouth daily             Apixaban (ELIQUIS PO)  Take 5 mg by mouth 2 times daily              atorvastatin (LIPITOR) 40 MG tablet  Take 40 mg by mouth daily              calcitRIOL (ROCALTROL) 0.25 MCG capsule  Take 0.25 mcg by mouth daily             ferrous sulfate 325 (65 Fe) MG tablet  Take 325 mg by mouth 2 times daily             furosemide (LASIX) 40 MG tablet  Take 1 tablet by mouth 2 times daily for 5 days             hydrALAZINE (APRESOLINE) 50 MG tablet  Take 1.5 tablets by mouth 3 times daily             hydrOXYzine (ATARAX) 10 MG tablet  Take 10 mg by mouth nightly             levothyroxine (SYNTHROID) 25 MCG tablet  Take 25 mcg by mouth Daily             lisinopril (PRINIVIL;ZESTRIL) 5 MG tablet  Take 5 mg by mouth daily             metoprolol succinate (TOPROL XL) 50 MG extended release tablet  Take 1 tablet by mouth daily             pantoprazole (PROTONIX) 40 MG tablet  Take 40 mg by mouth daily             primidone (MYSOLINE) 50 MG tablet  Take 100 mg by mouth 2 times daily              SODIUM BICARBONATE PO  Take 650 mg by mouth 2 times daily             tamsulosin (FLOMAX) 0.4 MG capsule  Take 1 capsule by mouth daily             vitamin D 25 MCG (1000 UT) CAPS  Take 2,000 Units by mouth daily                 Discharge Medication List as of 6/23/2021  3:40 PM        Discharge Medication List as of 6/23/2021  3:40 PM        Discharge Medication List as of 6/23/2021  3:40 PM          INTERNAL MEDICINE FOLLOW UP/INSTRUCTIONS:  Per [facility]    Preparing for this patient's discharge, including paperwork, orders, instructions, and meeting with patient did required 34 minutes.     Electronically signed by Gus Snowden DO on 6/26/2021 at 9:08 AM

## (undated) DEVICE — GLOVE ORANGE PI 7 1/2   MSG9075

## (undated) DEVICE — PACK PROCEDURE SURG GEN CUST

## (undated) DEVICE — TRAY SET AMBULATORY INSTRUMENT S REUSABLE

## (undated) DEVICE — COVER US PRB W15XL120CM W/ GEL RUBBERBAND TAPE STRP FLD GEN

## (undated) DEVICE — NEEDLE HYPO 22GA L1.5IN BLK POLYPR HUB S STL REG BVL STR

## (undated) DEVICE — TRAY THORCENT CATH 6FR PGTL DRNGE SYS SAFE T CENTESIS

## (undated) DEVICE — FORCEPS BX L160CM JAW DIA2.4MM YEL L CAP W/ NDL DISP RAD

## (undated) DEVICE — TOWEL,OR,DSP,ST,BLUE,STD,6/PK,12PK/CS: Brand: MEDLINE

## (undated) DEVICE — CHLORAPREP 26ML ORANGE

## (undated) DEVICE — CANNULA NSL ORAL AD FOR CAPNOFLEX CO2 O2 AIRLFE